# Patient Record
Sex: FEMALE | Race: WHITE | NOT HISPANIC OR LATINO | Employment: FULL TIME | URBAN - METROPOLITAN AREA
[De-identification: names, ages, dates, MRNs, and addresses within clinical notes are randomized per-mention and may not be internally consistent; named-entity substitution may affect disease eponyms.]

---

## 2017-01-06 ENCOUNTER — OFFICE VISIT (OUTPATIENT)
Dept: FAMILY MEDICINE | Facility: CLINIC | Age: 47
End: 2017-01-06

## 2017-01-06 VITALS
HEIGHT: 64 IN | TEMPERATURE: 97.8 F | WEIGHT: 180 LBS | BODY MASS INDEX: 30.73 KG/M2 | SYSTOLIC BLOOD PRESSURE: 119 MMHG | DIASTOLIC BLOOD PRESSURE: 73 MMHG | HEART RATE: 83 BPM

## 2017-01-06 DIAGNOSIS — R01.1 SYSTOLIC EJECTION MURMUR: ICD-10-CM

## 2017-01-06 DIAGNOSIS — Z01.818 PREOP GENERAL PHYSICAL EXAM: Primary | ICD-10-CM

## 2017-01-06 NOTE — MR AVS SNAPSHOT
After Visit Summary   1/6/2017    Barbara Camacho    MRN: [id number]           Patient Information     Date Of Birth          1970        Visit Information        Provider Department      1/6/2017 2:10 PM Chucho Lock MD [facility]        Today's Diagnoses     Preop general physical exam    -  1     Systolic ejection murmur           Care Instructions      Presurgery Checklist  You are scheduled to have surgery. The healthcare staff will try to make your stay comfortable. Use the guidelines below to remind yourself what to do before surgery. Be sure to follow any specific pre-op instructions from your surgeon or nurse.   Preparing for Surgery  Ask your surgeon if you ll need a blood transfusion during surgery and if so, how to prepare for it. In some cases, you can donate blood before surgery. If needed, this blood can be given back (transfused) to you during or after surgery.  If you are having abdominal surgery, ask what you need to do to clear your bowel.  Tell your surgeon if you have allergies to any medications or foods.  Arrange for an adult family member or friend to drive you home after surgery. If possible, have someone ready to help you at home as you recover.  Call the surgeon if you get a cold, fever, sore throat, diarrhea, or other health problem just before surgery. Your surgeon can decide whether or not to postpone the surgery.  Medications  Tell your surgeon about all medications you take, including prescription and over-the-counter products such as herbal remedies and vitamins. Ask if you should continue taking them.  If you take ibuprofen, naproxen, or  blood thinners  such as aspirin, clopidogrel (Plavix), or warfarin (Coumadin), ask your surgeon whether you should stop taking them and how long before surgery you should stop.  You may be told to take antibiotics just before surgery to prevent infection. If so, follow instructions carefully on how to take them.  If  you are told to take medications called anticoagulants to prevent blood clots after surgery, be sure to follow the instructions on how to take them.  Stop Smoking  If you smoke, healing may take longer. So at least 2 week(s) before surgery, stop smoking.  Bathing or Showering Before Surgery  If instructed, wash with antibacterial soap. Afterward, do not use lotions or powders.  If you are having surgery on the head, you may be asked to shampoo with antibacterial soap. Follow instructions for doing so.  Do Not Remove Hair from the Surgery Site  Do not shave hair from the incision site, unless you are given specific instructions to do so. Usually, if hair needs to be removed, it will be done at the hospital right before surgery.  Don t Eat or Drink  Your doctor will tell you when to stop eating and drinking. If you do not follow your doctor's instructions, your procedure may be postponed or rescheduled for another day.  If your surgeon tells you to continue any medications, take them with small sips of water.  You can brush your teeth and rinse your mouth, but don t swallow any water.  Day of Surgery  Do not wear makeup. Do not use perfume, deodorant, or hairspray. Remove nail polish and artificial nails.  Leave jewelry (including rings), watches, and other valuables at home.  Be sure to bring health insurance cards or forms and a photo ID.  Bring a list of your medications (include the name, dose, how often you take them, and the time last dose was taken).  Arrive on time at the hospital or surgery facility.        Follow-ups after your visit        Your next 10 appointments already scheduled     Jan 06, 2017  2:10 PM   PRE-OP with Chucho Lock MD   [facility] ([facility])    [address] [place] [id number]   [phone]              Who to contact     Please call your clinic at [phone] to:    Ask questions about your health    Make or cancel appointments    Discuss your  "medicines    Learn about your test results    Speak to your doctor   If you have compliments or concerns about an experience at your clinic, or if you wish to file a complaint, please contact [facility] Patient Relations at [phone] or email us at [email or web address]         Additional Information About Your Visit        [employer] Information     [employer] is an electronic gateway that provides easy, online access to your medical records. With [facility], you can request a clinic appointment, read your test results, renew a prescription or communicate with your care team.     To sign up for [facility] visit the website at [email or web address]   You will be asked to enter the access code listed below, as well as some personal information. Please follow the directions to create your username and password.     Your access code is: [email or web address]  Expires: 2017  2:04 PM     Your access code will  in 90 days. If you need help or a new code, please contact your [facility] or call [phone] for assistance.        Care EveryWhere ID     This is your Care EveryWhere ID. This could be used by other organizations to access your [facility] medical records  [id number]        Your Vitals Were     Pulse Temperature Height BMI (Body Mass Index) Last Period       83 97.8  F (36.6  C) (Oral) 5' 4\" (162.6 cm) 30.88 kg/m2 2011        Blood Pressure from Last 3 Encounters:   17 119/73   10/19/16 111/76   10/06/16 112/75    Weight from Last 3 Encounters:   17 180 lb (81.647 kg)   10/19/16 186 lb 6.4 oz (84.55 kg)   10/06/16 201 lb (91.173 kg)              We Performed the Following     Basic Metabolic Panel ([facility])     CBC with Diff Plt ([facility])        Primary Care Provider Office Phone # Fax #    Edvin Ring [phone]       [facility] [address] [place] [address]        Thank you!     Thank you for choosing " [facility]  for your care. Our goal is always to provide you with excellent care. Hearing back from our patients is one way we can continue to improve our services. Please take a few minutes to complete the written survey that you may receive in the mail after your visit with us. Thank you!             Your Updated Medication List - Protect others around you: Learn how to safely use, store and throw away your medicines at www.disposemymeds.org.          This list is accurate as of: 1/6/17  2:04 PM.  Always use your most recent med list.                   Brand Name Dispense Instructions for use    citalopram 10 MG tablet    celeXA    30 tablet    Take 1 tablet (10 mg) by mouth daily       erythromycin ophthalmic ointment    ROMYCIN    3.5 g    Place 1 Application into both eyes 2 times daily Apply to eyelids until clear. Restart prn

## 2017-01-06 NOTE — PROGRESS NOTES
[address] [address]  Phone: [phone]  Fax: [phone]    1/6/2017    Adult PRE-OP Evaluation:    Barbara Camacho, 1970 presents for pre-operative evaluation and assessment as requested by Dr. Jessica Robbins, prior to undergoing surgery/procedure for treatment of  Thyroid nodule .    Proposed procedure: Thyroid nodule removal both left and right    Date of Surgery/ Procedure: 1/9/17  Hospital/Surgical Facility: Patient uncertain, no pre-op assessment form available to us at this visit.    ? [facility] - General Surgeon - Jessica Robbins  ?     Primary Physician: Edvin Ring   Type of Anesthesia Anticipated: General  History of anesthesia complications: NONE  History of  abnormal bleeding: NONE   History of blood transfusions: NO  Patient has a Health Care Directive or Living Will:  NO    Preoperative Questions   1. NO - Do you have a history of heart attack, stroke, stent, bypass or surgery on an artery in the head, neck, heart or legs?  2. NO - Do you ever have any pain or discomfort in your chest?  3. NO - Have you ever had a severe pain across the front of your chest lasting for half an hour or more?  4. NO - Do you have a history of Congestive Heart Failure?  5. NO - Are you troubled by shortness of breath when: walking on the level/ up a slight hill/ at night?  6. NO - Does your chest ever sound wheezy or whistling?  7. NO - Do you currently have a cold, bronchitis or other respiratory infection?  8. NO - Have you had a cold, bronchitis or other respiratory infection within the last 2 weeks?  9. NO - Do you usually have a cough?  10. NO - Do you sometimes get pains in the calves of your legs when you walk?  11. NO - Do you or anyone in your family have previous history of blood clots?  12. NO - Do you or does anyone in your family have a serious bleeding problem such as prolonged bleeding following surgeries or cuts?  13. NO - Have you ever had problems with anemia or been told  "to take iron pills?  14. NO - Have you had any abnormal blood loss such as black, tarry or bloody stools, or abnormal vaginal bleeding?  15. NO - Have you ever had a blood transfusion?  16. NO - Have you or any of your relatives ever had problems with anesthesia?  17. NO - Do you have sleep apnea, excessive snoring or daytime drowsiness?  18. NO - Do you have any prosthetic heart valves?  19. NO - Do you have prosthetic joints?  20. NO - Is there any chance that you may be pregnant?    Patient Active Problem List   Diagnosis     Thyroid nodule     History of ovarian cancer     Anxiety     Systolic ejection murmur         Current Outpatient Prescriptions on File Prior to Visit:  citalopram (CELEXA) 10 MG tablet Take 1 tablet (10 mg) by mouth daily   erythromycin (ROMYCIN) ophthalmic ointment Place 1 Application into both eyes 2 times daily Apply to eyelids until clear. Restart prn     No current facility-administered medications on file prior to visit.    OTC products: None, except as noted above    Allergies   Allergen Reactions     Tylenol W/Codeine [Acetaminophen-Codeine]      Latex Allergy: NO    Social History     Social History     Marital Status:      Spouse Name: N/A     Number of Children: N/A     Years of Education: N/A     Social History Main Topics     Smoking status: Current Every Day Smoker -- 0.50 packs/day     Smokeless tobacco: Never Used     Alcohol Use: No     Drug Use: Yes      Comment: 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 In outpatient treatment.      Sexual Activity: No     Other Topics Concern     None     Social History Narrative       REVIEW OF SYSTEMS:   Constitutional, HEENT, cardiovascular, pulmonary, gi and gu systems are negative, except as otherwise noted.    EXAM:     Patient Vitals for the past 24 hrs:   BP Temp Temp src Pulse Height Weight   01/06/17 1320 119/73 mmHg 97.8  F (36.6  C) Oral 83 5' 4\" (162.6 cm) 180 lb (81.647 kg)     Body mass index is " 30.88 kg/(m^2).  GENERAL: healthy, alert and no distress  EYES: Eyes grossly normal to inspection, extraocular movements - intact, and PERRL  HENT: ear canals- normal; TMs- normal; Nose- normal; Mouth- no ulcers, no lesions + firm palpable nodule on R side > Left nodule  NECK: no tenderness, no adenopathy, no asymmetry, no masses, no stiffness; thyroid- normal to palpation  RESP: lungs clear to auscultation - no rales, no rhonchi, no wheezes  CV: regular rates and rhythm, normal S1 S2, + 2/6 ejection murmur best heard over left 2nd intercostal space  ABDOMEN: soft, no tenderness, no  hepatosplenomegaly, no masses, normal bowel sounds  MS: extremities- no gross deformities noted, no edema  SKIN: no suspicious lesions, no rashes  NEURO: strength and tone- normal, sensory exam- grossly normal, mentation- intact, speech- normal, reflexes- symmetric  BACK: no CVA tenderness, no paralumbar tenderness  PSYCH: Alert and oriented times 3; speech- coherent , normal rate and volume; able to articulate logical thoughts  LYMPHATICS: ant. cervical- normal, post. cervical- normal    DIAGNOSTICS:      None  No EKG indicated, no significant cardiac history    RISK ASSESSMENT:     Cardiovascular Risk:  -Patient is able to participate in strenuous activities without chest pain.  -The patient does not have chest pain at rest or with exertion  -Patient does not have a history of congestive heart failure.    -The patient does not have a history of stroke and does not have a history of valvular disease.    Pulmonary Risk:  -In terms of risk factors for pulmonary complication, the patient is an active 8 cigarette day smoker, 10 pack year history    Perioperative Complications:  -The patient does not have a history of bleeding or clotting problems in the past.    -The patient has not had complications from surgeries.    -The patient does not have a family history of any anesthesia or surgical complications.      IMPRESSION:   Reason for  surgery/procedure: Thyroid nodule removal bilaterally    The proposed surgical procedure is considered LOW risk.    For above listed surgery and anesthesia:   Patient is at low risk for surgery/procedure and perioperative/procedure complications.    RECOMMENDATIONS:     Labs:  No labs indicated at this time per [facility] pre-op guidelines. (Pre-op form was not available to us at this visit)    Fasting:  NPO for 12 hours prior to surgery    Preop Plan:  --Approval given to proceed with proposed procedure, without further diagnostic evaluation    Medications:  Patient should take their regular medications the morning of surgery unless otherwise instructed.    Hold aspirin 7 days prior to surgery.  Hold ibuprofen for 5 days prior.      Chucho Lock MD PGY2  [facility]  [phone]      Please contact our office if there are any further questions or information required about this patient.

## 2017-01-09 NOTE — PROGRESS NOTES
Preceptor attestation:  Patient seen and discussed with the resident.  Assessment and plan reviewed with resident and agreed upon.  Supervising physician: Ray Bolanos  [facility]

## 2017-06-02 ENCOUNTER — PRE VISIT (OUTPATIENT)
Dept: ORTHOPEDICS | Facility: CLINIC | Age: 47
End: 2017-06-02

## 2017-06-02 DIAGNOSIS — M25.532 LEFT WRIST PAIN: Primary | ICD-10-CM

## 2017-06-02 NOTE — TELEPHONE ENCOUNTER
Pt seen at [facility] in [place], [place]. Records available in [facility]. Not able to get images pushed.  Pt did report that she has a disc with her. She is coming in 30 minutes early and we will try to load images from that disc.    Simba Quintero RN

## 2017-07-20 DIAGNOSIS — F41.9 ANXIETY: ICD-10-CM

## 2017-07-20 RX ORDER — CITALOPRAM HYDROBROMIDE 10 MG/1
10 TABLET ORAL DAILY
Qty: 30 TABLET | Refills: 3 | Status: SHIPPED | OUTPATIENT
Start: 2017-07-20 | End: 2017-09-29

## 2017-09-29 DIAGNOSIS — F41.9 ANXIETY: ICD-10-CM

## 2017-09-29 RX ORDER — CITALOPRAM HYDROBROMIDE 10 MG/1
10 TABLET ORAL DAILY
Qty: 30 TABLET | Refills: 3 | Status: SHIPPED | OUTPATIENT
Start: 2017-09-29 | End: 2019-05-17

## 2019-05-17 ENCOUNTER — OFFICE VISIT (OUTPATIENT)
Dept: FAMILY MEDICINE | Facility: CLINIC | Age: 49
End: 2019-05-17
Payer: MEDICAID

## 2019-05-17 VITALS
RESPIRATION RATE: 16 BRPM | WEIGHT: 200 LBS | TEMPERATURE: 98.2 F | OXYGEN SATURATION: 99 % | HEIGHT: 63 IN | DIASTOLIC BLOOD PRESSURE: 76 MMHG | SYSTOLIC BLOOD PRESSURE: 118 MMHG | HEART RATE: 61 BPM | BODY MASS INDEX: 35.44 KG/M2

## 2019-05-17 DIAGNOSIS — F41.9 ANXIETY: ICD-10-CM

## 2019-05-17 DIAGNOSIS — Z98.84 HISTORY OF ROUX-EN-Y GASTRIC BYPASS: ICD-10-CM

## 2019-05-17 DIAGNOSIS — Z13.1 SCREENING FOR DIABETES MELLITUS: ICD-10-CM

## 2019-05-17 DIAGNOSIS — E04.9 THYROID GOITER: ICD-10-CM

## 2019-05-17 DIAGNOSIS — K21.9 GASTROESOPHAGEAL REFLUX DISEASE, ESOPHAGITIS PRESENCE NOT SPECIFIED: ICD-10-CM

## 2019-05-17 DIAGNOSIS — Z00.00 ENCOUNTER FOR PREVENTIVE CARE: Primary | ICD-10-CM

## 2019-05-17 DIAGNOSIS — Z12.31 ENCOUNTER FOR SCREENING MAMMOGRAM FOR BREAST CANCER: ICD-10-CM

## 2019-05-17 DIAGNOSIS — F17.210 CIGARETTE NICOTINE DEPENDENCE WITHOUT COMPLICATION: ICD-10-CM

## 2019-05-17 DIAGNOSIS — Z86.2 HISTORY OF ANEMIA: ICD-10-CM

## 2019-05-17 DIAGNOSIS — Z13.220 SCREENING FOR LIPOID DISORDERS: ICD-10-CM

## 2019-05-17 LAB
ALBUMIN SERPL-MCNC: 3.9 G/DL (ref 3.4–5)
ALP SERPL-CCNC: 75 U/L (ref 40–150)
ALT SERPL W P-5'-P-CCNC: 32 U/L (ref 0–50)
ANION GAP SERPL CALCULATED.3IONS-SCNC: 4 MMOL/L (ref 3–14)
AST SERPL W P-5'-P-CCNC: 29 U/L (ref 0–45)
BILIRUB SERPL-MCNC: 0.5 MG/DL (ref 0.2–1.3)
BUN SERPL-MCNC: 9 MG/DL (ref 7–30)
CALCIUM SERPL-MCNC: 8.6 MG/DL (ref 8.5–10.1)
CHLORIDE SERPL-SCNC: 108 MMOL/L (ref 94–109)
CHOLEST SERPL-MCNC: 158 MG/DL
CO2 SERPL-SCNC: 28 MMOL/L (ref 20–32)
CREAT SERPL-MCNC: 0.53 MG/DL (ref 0.52–1.04)
ERYTHROCYTE [DISTWIDTH] IN BLOOD BY AUTOMATED COUNT: 16.9 % (ref 10–15)
FERRITIN SERPL-MCNC: 6 NG/ML (ref 8–252)
GFR SERPL CREATININE-BSD FRML MDRD: >90 ML/MIN/{1.73_M2}
GLUCOSE SERPL-MCNC: 79 MG/DL (ref 70–99)
HBA1C MFR BLD: 5.4 % (ref 4.1–5.7)
HCT VFR BLD AUTO: 35.7 % (ref 35–47)
HDLC SERPL-MCNC: 75 MG/DL
HGB BLD-MCNC: 10.5 G/DL (ref 11.7–15.7)
IRON SATN MFR SERPL: 5 % (ref 15–46)
IRON SERPL-MCNC: 19 UG/DL (ref 35–180)
LDLC SERPL CALC-MCNC: 69 MG/DL
MCH RBC QN AUTO: 24.5 PG (ref 26.5–33)
MCHC RBC AUTO-ENTMCNC: 29.4 G/DL (ref 31.5–36.5)
MCV RBC AUTO: 83 FL (ref 78–100)
NONHDLC SERPL-MCNC: 83 MG/DL
PLATELET # BLD AUTO: 267 10E9/L (ref 150–450)
POTASSIUM SERPL-SCNC: 4.1 MMOL/L (ref 3.4–5.3)
PROT SERPL-MCNC: 7 G/DL (ref 6.8–8.8)
RBC # BLD AUTO: 4.29 10E12/L (ref 3.8–5.2)
SODIUM SERPL-SCNC: 140 MMOL/L (ref 133–144)
TIBC SERPL-MCNC: 355 UG/DL (ref 240–430)
TRIGL SERPL-MCNC: 70 MG/DL
TSH SERPL DL<=0.005 MIU/L-ACNC: 1.26 MU/L (ref 0.4–4)
VIT B12 SERPL-MCNC: 306 PG/ML (ref 193–986)
WBC # BLD AUTO: 5.6 10E9/L (ref 4–11)

## 2019-05-17 RX ORDER — HYDROXYZINE HYDROCHLORIDE 25 MG/1
25-50 TABLET, FILM COATED ORAL
Qty: 60 TABLET | Refills: 1 | Status: SHIPPED | OUTPATIENT
Start: 2019-05-17 | End: 2019-08-01

## 2019-05-17 RX ORDER — CALCIUM CARBONATE 500(1250)
1 TABLET ORAL 2 TIMES DAILY
Qty: 180 TABLET | Refills: 3 | Status: SHIPPED | OUTPATIENT
Start: 2019-05-17 | End: 2024-07-08

## 2019-05-17 RX ORDER — CITALOPRAM HYDROBROMIDE 40 MG/1
40 TABLET ORAL DAILY
Qty: 30 TABLET | Refills: 3 | Status: SHIPPED | OUTPATIENT
Start: 2019-05-17 | End: 2020-08-21

## 2019-05-17 ASSESSMENT — MIFFLIN-ST. JEOR: SCORE: 1516.08

## 2019-05-17 ASSESSMENT — PATIENT HEALTH QUESTIONNAIRE - PHQ9
SUM OF ALL RESPONSES TO PHQ QUESTIONS 1-9: 9
5. POOR APPETITE OR OVEREATING: MORE THAN HALF THE DAYS

## 2019-05-17 ASSESSMENT — ANXIETY QUESTIONNAIRES
6. BECOMING EASILY ANNOYED OR IRRITABLE: SEVERAL DAYS
IF YOU CHECKED OFF ANY PROBLEMS ON THIS QUESTIONNAIRE, HOW DIFFICULT HAVE THESE PROBLEMS MADE IT FOR YOU TO DO YOUR WORK, TAKE CARE OF THINGS AT HOME, OR GET ALONG WITH OTHER PEOPLE: VERY DIFFICULT
7. FEELING AFRAID AS IF SOMETHING AWFUL MIGHT HAPPEN: NOT AT ALL
3. WORRYING TOO MUCH ABOUT DIFFERENT THINGS: NEARLY EVERY DAY
5. BEING SO RESTLESS THAT IT IS HARD TO SIT STILL: NEARLY EVERY DAY
1. FEELING NERVOUS, ANXIOUS, OR ON EDGE: NEARLY EVERY DAY
GAD7 TOTAL SCORE: 15
2. NOT BEING ABLE TO STOP OR CONTROL WORRYING: NEARLY EVERY DAY

## 2019-05-17 NOTE — NURSING NOTE
"48 year old  Chief Complaint   Patient presents with     Physical     Pt. presents to the clinic today from [facility] for a physical exam. Trouble sleeping. wants an increase on CELEXA       Blood pressure 118/76, pulse 61, temperature 98.2  F (36.8  C), temperature source Oral, resp. rate 16, height 1.608 m (5' 3.3\"), weight 90.7 kg (200 lb), last menstrual period 06/30/2011, SpO2 99 %. Body mass index is 35.09 kg/m .  BP completed using cuff size:    Patient Active Problem List   Diagnosis     Thyroid nodule     History of ovarian cancer     Anxiety     Systolic ejection murmur       Wt Readings from Last 2 Encounters:   05/17/19 90.7 kg (200 lb)   01/06/17 81.6 kg (180 lb)     BP Readings from Last 3 Encounters:   05/17/19 118/76   01/06/17 119/73   10/19/16 111/76       Allergies   Allergen Reactions     Tylenol W/Codeine [Acetaminophen-Codeine]        Current Outpatient Medications   Medication     citalopram (CELEXA) 10 MG tablet     erythromycin (ROMYCIN) ophthalmic ointment     No current facility-administered medications for this visit.        Social History     Tobacco Use     Smoking status: Current Every Day Smoker     Packs/day: 0.50     Smokeless tobacco: Never Used   Substance Use Topics     Alcohol use: No     Drug use: Yes     Comment: 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 In outpatient treatment.        Honoring Choices - Health Care Directive Guide offered to patient at time of visit.    Health Maintenance Due   Topic Date Due     HIV SCREEN (SYSTEM ASSIGNED)  11/14/1989     PREVENTIVE CARE VISIT  10/06/2017     PHQ-2  01/01/2019       Immunization History   Administered Date(s) Administered     Influenza Vaccine, 3 YRS +, IM (QUADRIVALENT W/PRESERVATIVES) 10/06/2016     TDAP Vaccine (Boostrix) 10/19/2016       No results found for: PAP      Recent Labs   Lab Test 10/06/16  0908 07/23/11  1200 07/21/11  0743   A1C 5.5  --   --    LDL 83  --   --    HDL 69.4  --   --    TRIG " 65.7  --   --    CR  --  0.50* 0.59   GFRESTIMATED  --  >90 >90   GFRESTBLACK  --  >90 >90   POTASSIUM  --  3.5 3.7       PHQ-2 ( 1999 Pfizer) 5/17/2019 1/6/2017   Q1: Little interest or pleasure in doing things 0 0   Q2: Feeling down, depressed or hopeless 1 0   PHQ-2 Score 1 0       PHQ-9 SCORE 5/17/2019   PHQ-9 Total Score 9       ARLET-7 SCORE 5/17/2019   Total Score 15       No flowsheet data found.    Karla Schmitt CMA  May 17, 2019 9:09 AM

## 2019-05-17 NOTE — PROGRESS NOTES
" SUBJECTIVE:   Barbara Camacho is an 48 year old year old woman who presents for preventive health visit. Barbara needs an admission physical to [facility] chemical dependency treatment facility and was admitted 5/13/19  Patient has a history of chemical dependency to methamphetamine, clean date 12/14/18  Last visit to the dentist was unknown  Last visit to an eye provider was 2018: no improvement with corrective lenses, glasses are now broken     Thyroid goiter: been present in 2015. Recently had fine needle aspiration at [facility] on 4/25/19 and was never informed of results. Ultrasound last completed 4/19/19 which had demonstrated complex mass that had increased in size to 6cm x 1cm. Ultrasound and FNA were completed while patient was incarcerated and she never learned the results. Was done because she has noted slight increase in dysphagia compared to previous. Continues to eat/drink fluids well without difficulty and denies any airway obstruction. Endorses nodules on bilateral sides of thyroid, right is significantly larger than left.     Nicotine dependence; smoked off an on for the past 25 years. Pack year history roughly 12.5 years. Currently smokes about 6 cigarettes/day and is not interested in smoking cessation today. Denies any cough, wheezing, or dyspnea. Would consider pneumococcal vaccine today for ongoing smoking history.     Anxiety : with longstanding issues of sleeping. Has issues with falling asleep stating \"i'm an overthinker' and worries constantly. Also has issues with maintaining sleep. Is currently on celexa and would like to have dose increased. celexa was started roughly 2 months ago and reports \"it calmed it a litle bit\" but since transitioning to [facility] anxiety has been significantly worse. Has been having regular headaches.     ARLET-7 SCORE 5/17/2019   Total Score 15     PHQ-9 SCORE 5/17/2019   PHQ-9 Total Score 9     Methamphetamine addiction: currently in residential " "chemical dependency treatment at [facility] for methamphetamine addiction. Started using at age 37, never used IV. First time in treatment. Overall feeling well being sober, was incarcerated prior to starting at [facility] and doing well. Hoping to graduate to their outpatient program and maintain sober lifestyle.     GERD: previously controlled with zantac once daily. Would help \"for the most part\" with her symptoms. Symptoms present with indigestion, burning sensation, and belching.     History of chidi-en-y gastric bypass: for obesity in 2011. Had success with weight loss, has only recently started to gain weight back after achieving sobriety from methamphetamine addiction. Has not followed up with bariatric surgeon in several years, feels well overall.     Anemia: history of anemia with previous chidi-en-y bypass. Had not been getting iron supplementation while incarcerated and hoping to restart now she has been released. Denies any abnormal blood loss, dizziness, lightheadedness.     Healthy Habits:    Amount of exercise or daily activities, outside of work: walking    Problems taking medications regularly No    Medication side effects: No    Have you had an eye exam in the past two years? no    Do you see a dentist twice per year? no    Do you have sleep apnea, excessive snoring or daytime drowsiness?no    Water    Abuse: Current or Past(Physical, Sexual or Emotional)- No  Do you feel safe in your environment - Yes    Past Medical History:   Diagnosis Date     Anxiety      Cancer (H)     ovarian cancer at age 26 - treated with right oophorectomy. no chemo/radiation     Substance abuse (H)      Thyroid nodule 2016    bx done at [facility]      Past Surgical History:   Procedure Laterality Date     APPENDECTOMY OPEN      angeles and ovary removed all at same time as appy     CHOLECYSTECTOMY       FINE NEEDLE ASPIRATION  2019    thyroid     HERNIA REPAIR       LAPAROSCOPIC BYPASS GASTRIC  7/20/2011    " Procedure:LAPAROSCOPIC BYPASS GASTRIC; Hiatal hernia repair; Surgeon:FRANKIE VUONG; Location:[facility] OR     OOPHORECTOMY  2000?     Social History     Socioeconomic History     Marital status:      Spouse name: Not on file     Number of children: Not on file     Years of education: Not on file     Highest education level: Not on file   Occupational History     Not on file   Social Needs     Financial resource strain: Not on file     Food insecurity:     Worry: Not on file     Inability: Not on file     Transportation needs:     Medical: Not on file     Non-medical: Not on file   Tobacco Use     Smoking status: Current Every Day Smoker     Packs/day: 0.50     Smokeless tobacco: Never Used   Substance and Sexual Activity     Alcohol use: No     Drug use: Yes     Comment: 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 In outpatient treatment.      Sexual activity: Never   Lifestyle     Physical activity:     Days per week: Not on file     Minutes per session: Not on file     Stress: Not on file   Relationships     Social connections:     Talks on phone: Not on file     Gets together: Not on file     Attends Gnosticist service: Not on file     Active member of club or organization: Not on file     Attends meetings of clubs or organizations: Not on file     Relationship status: Not on file     Intimate partner violence:     Fear of current or ex partner: Not on file     Emotionally abused: Not on file     Physically abused: Not on file     Forced sexual activity: Not on file   Other Topics Concern     Parent/sibling w/ CABG, MI or angioplasty before 65F 55M? Not Asked   Social History Narrative     Not on file     Family History   Problem Relation Age of Onset     Coronary Artery Disease Maternal Grandmother      Diabetes Paternal Grandmother      Cancer No family hx of        If you drink alcohol do you typically have >3 drinks per day or >10 drinks per week? No                     Reviewed orders  with patient.  Reviewed health maintenance and updated orders accordingly - Yes      Current Outpatient Medications   Medication Sig Dispense Refill     citalopram (CELEXA) 10 MG tablet Take 1 tablet (10 mg) by mouth daily 30 tablet 3     erythromycin (ROMYCIN) ophthalmic ointment Place 1 Application into both eyes 2 times daily Apply to eyelids until clear. Restart prn (Patient not taking: Reported on 5/17/2019) 3.5 g 3          Allergies   Allergen Reactions     Tylenol W/Codeine [Acetaminophen-Codeine]         Preventive Health Screenings:  Mammogram: several years since last completed   Pap: refused - stated last was completed while in halfway in [place] 4-5 months ago and was normal.   Colonoscopy: Not indicated  HIV: Declined: had completed upon intake to halfway and has not been sexually active since.   STI: Declined: had completed upon intake to halfway and has not been sexually active since.   A1c: completed today 5/17/19  Lung cancer screening: Not indicated at this time: pack/year history 12.5 years  Lipids: Completed today 5/17/19  HCV: Declined: had completed upon intake to halfway and has not been sexually active or used any substances IV since.   Vaccinations: Current on Tdap (10/19/16). Uncertain if she has ever had MMR vaccines, hepatitis A or B vaccines. Will assess titers and proceed from there today.   DEXA: Not indicated      ROS:  Constitutional: no fevers, chills, night sweats or unintentional weight change   Eyes: no vision change, diplopia or red eyes   Ears, Nose, Mouth, Throat: no tinnitus or hearing change, no epistaxis or nasal discharge, no oral lesions, throat clear   Cardiovascular: no chest pain, palpitations, or pain with walking, no orthopnea or PND   Respiratory: no dyspnea, cough, shortness of breath or wheezing   Breasts: no skin changes, puckering, nipple discharge, masses  GI: no nausea, vomiting, diarrhea or constipation, no abdominal pain   : no change in urine, no dysuria or  "hematuria, no sexual dysfunction   Musculoskeletal: no joint or muscle pain or swelling   Integumentary: no concerning lesions or moles   Neuro: no loss of strength or sensation, no numbness or tingling, no tremor, no dizziness,  no gait disturbance   Endo: no polyuria or polydipsia, no temperature intolerance   Heme/Lymph: no concerning bumps, no bleeding problems   Allergy: no environmental allergies   Psych: no depression, + anxiety, some sleep problems      OBJECTIVE:   /76 (BP Location: Left arm, Patient Position: Chair, Cuff Size: Adult Regular)   Pulse 61   Temp 98.2  F (36.8  C) (Oral)   Resp 16   Ht 1.608 m (5' 3.3\")   Wt 90.7 kg (200 lb)   LMP 06/30/2011   SpO2 99%   BMI 35.09 kg/m    EXAM:  GENERAL: healthy, alert and no distress  EYES: Eyes grossly normal to inspection, PERRL and conjunctivae and sclerae normal  HENT: ear canals and TM's normal, nose and mouth without ulcers or lesions  NECK: no adenopathy, no asymmetry, masses, or scars and thyroid normal to palpation  RESP: lungs clear to auscultation - no rales, rhonchi or wheezes  BREAST: normal without masses, tenderness or nipple discharge and no palpable axillary masses or adenopathy  CV: regular rate and rhythm, normal S1 S2, no S3 or S4, no murmur, click or rub, no peripheral edema and peripheral pulses strong  ABDOMEN: soft, nontender, no hepatosplenomegaly, no masses and bowel sounds normal  MS: no gross musculoskeletal defects noted, no edema  SKIN: no suspicious lesions or rashes  NEURO: Normal strength and tone, mentation intact and speech normal  PSYCH: mentation appears normal, affect normal/bright    ASSESSMENT/PLAN:   1. Encounter for preventive care  Not meeting AHA guidelines of obtaining 150min/week of activity, encouraged patient to increase walking, participation of yoga at [facility] to help. Current on vision screening however corrective lenses has not helped vision and are now broken, [facility] to schedule office visit " as well as preventive dental care. Uncertain if she has had routine immunizations and will check titers.   - Comprehensive metabolic panel - Results > 1hr  - Hemoglobin A1c ([facility])  - Rubella Antibody IgG Quantitative  - Rubeola Antibody IgG; Future  - Mumps Antibody IgG; Future  - Hepatitis B Surface Antibody    2. Anxiety  Increase in citalopram from 20mg a day to 40mg a day for recent changes in environment and adjusting to life at chemical dependency center. With complaints of insomnia will also add PRN hydroxyzine 25mg to take at bedtime PRN. At follow up consider completing EKG to ensure QTc is stable.   ARLET-7 SCORE 5/17/2019   Total Score 15     - hydrOXYzine (ATARAX) 25 MG tablet; Take 1-2 tablets (25-50 mg) by mouth nightly as needed for other (insomnia)  Dispense: 60 tablet; Refill: 1  - citalopram (CELEXA) 40 MG tablet; Take 1 tablet (40 mg) by mouth daily  Dispense: 30 tablet; Refill: 3  - Vitamin D Level  - Lipid panel reflex to direct LDL Fasting  - Screening Mammogram Digital Bilateral; Future  - calcium carbonate (OS-KAIVTA) 500 MG tablet; Take 1 tablet (500 mg) by mouth 2 times daily  Dispense: 180 tablet; Refill: 3    3. Encounter for screening mammogram for breast cancer  Due for mammogram, uncertain of when last one was completed. Should not remote history of ovarian cancer that only needed treatment with oophorectomy.   - Screening Mammogram Digital Bilateral; Future    4. Screening for lipoid disorders  - Lipid panel reflex to direct LDL Fasting    5. History of anemia  Secondary to mylene-en-y gastric bypass surgery. Will recheck labs prior to resuming iron supplementation to ensure adequate dosing of medication.   - CBC with platelets  - Ferritin  - Iron and iron binding capacity  - Vitamin B12    6. History of Mylene-en-Y gastric bypass  - CBC with platelets  - Ferritin  - Iron and iron binding capacity  - Vitamin B12    7. Gastroesophageal reflux disease, esophagitis presence not  "specified  - ranitidine (ZANTAC) 150 MG tablet; Take 1 tablet (150 mg) by mouth 2 times daily  Dispense: 60 tablet; Refill: 1    8. Screening for diabetes mellitus  Positive family history of diabetes and BMI >30kg/m2 will screen for diabetes today with reported weight gain after achieving sobriety.   - Hemoglobin A1c ([facility])    9. Thyroid goiter  Utilized [id number] consultation line and spoke with Dr. Nichols and resident. With findings from FNA and size of goiter would like to see her in clinic to repeat FNA and [place] on findings. Recheck TSH.   - TSH with free T4 reflex  - OTOLARYNGOLOGY REFERRAL     10. Cigarette nicotine dependence without complication  Not currently interested in smoking cessation, would like to have pneumococcal 23-V vaccine today. Encouraged smoking cessation when ready.   - Pneumococcal vaccine 23 valent PPSV23  (Pneumovax) [59376]  - ADMIN: Vaccine, Initial (17926)    COUNSELING:            reports that he has never smoked. Hhdick has never used smokeless tobacco.  Tobacco Cessation Action Plan: Information offered: Patient not interested at this time  Estimated body mass index is 35.09 kg/(m^2) as calculated from the following:    Height as of this encounter: 5' 3.3\" (1.608 m).    Weight as of this encounter: 200 lb (90.7 kg).   Weight management plan: Discussed healthy diet and exercise guidelines    Counseling Resources:  ATP IV Guidelines  Pooled Cohorts Equation Calculator  ICSI Preventive Guidelines  Dietary Guidelines for Americans, 2010  USDA's MyPlate  ASA Prophylaxis  Lung CA Screening    Options for treatment and follow-up care were reviewed with the patient. Barbara Camacho   engaged in the decision making process and verbalized understanding of the options discussed and agreed with the final plan.    "

## 2019-05-17 NOTE — PATIENT INSTRUCTIONS
1. Encounter for preventive care  Can increase exercise to meet AHA recommendations of 150 minutes/week. Needs to have routine eye exam and dental exam/cleaning completed. YOBANI Amado will help schedule this.   - Comprehensive metabolic panel - Results > 1hr  - Hemoglobin A1c ([facility])  - Rubella Antibody IgG Quantitative  - Rubeola Antibody IgG; Future  - Mumps Antibody IgG; Future  - Hepatitis B Surface Antibody  - Vitamin D Level  - Lipid panel reflex to direct LDL Fasting  - Screening Mammogram Digital Bilateral; Future  - calcium carbonate (OS-KAVITA) 500 MG tablet; Take 1 tablet (500 mg) by mouth 2 times daily  Dispense: 180 tablet; Refill: 3    2. Anxiety  Increase in Celexa to 40 mg/day and add at nighttime as needed hydroxyzine to help with falling and staying asleep  - hydrOXYzine (ATARAX) 25 MG tablet; Take 1-2 tablets (25-50 mg) by mouth nightly as needed for other (insomnia)  Dispense: 60 tablet; Refill: 1  - citalopram (CELEXA) 40 MG tablet; Take 1 tablet (40 mg) by mouth daily  Dispense: 30 tablet; Refill: 3    3. Encounter for screening mammogram for breast cancer  Due for annual mammogram with history of ovarian cancer.   - Screening Mammogram Digital Bilateral; Future    4. Screening for lipoid disorders  - Lipid panel reflex to direct LDL Fasting    5. History of anemia  Secondary to previous Mylene-en-y procedure. Last CBC in 10/2018; before restarting your iron supplements will recheck your labs.   - CBC with platelets  - Ferritin  - Iron and iron binding capacity  - Vitamin B12    6. History of Mylene-en-Y gastric bypass  As above  - CBC with platelets  - Ferritin  - Iron and iron binding capacity  - Vitamin B12    7. Gastroesophageal reflux disease, esophagitis presence not specified  - ranitidine (ZANTAC) 150 MG tablet; Take 1 tablet (150 mg) by mouth 2 times daily  Dispense: 60 tablet; Refill: 1    8. Screening for diabetes mellitus  - Hemoglobin A1c ([facility] CLINIC)    9. Thyroid goiter  I  will call over to our ENT providers and ask them about next steps with your thyroid after the needle biopsy last month.   - TSH with free T4 reflex    10. Cigarette nicotine dependence without complication  Nicotine gum to help with smoking cessation today.   - Nicotine (NICORETTE) 2mg gum. Place 1 each (2 mg) inside cheek as needed for smoking cessation.   - Pneumococcal vaccine 23 valent PPSV23  (Pneumovax) [47063]  - ADMIN: Vaccine, Initial (34040)

## 2019-05-18 ASSESSMENT — ANXIETY QUESTIONNAIRES: GAD7 TOTAL SCORE: 15

## 2019-05-20 LAB
DEPRECATED CALCIDIOL+CALCIFEROL SERPL-MC: 10 UG/L (ref 20–75)
HBV SURFACE AB SERPL IA-ACNC: 0.13 M[IU]/ML
RUBV IGG SERPL IA-ACNC: 7 IU/ML

## 2019-05-23 DIAGNOSIS — E55.9 VITAMIN D DEFICIENCY: ICD-10-CM

## 2019-05-23 DIAGNOSIS — D50.9 IRON DEFICIENCY ANEMIA, UNSPECIFIED IRON DEFICIENCY ANEMIA TYPE: Primary | ICD-10-CM

## 2019-05-23 RX ORDER — FERROUS GLUCONATE 324(38)MG
324 TABLET ORAL 2 TIMES DAILY WITH MEALS
Qty: 60 TABLET | Refills: 3 | Status: SHIPPED | OUTPATIENT
Start: 2019-05-23 | End: 2020-08-21

## 2019-05-23 RX ORDER — ERGOCALCIFEROL 1.25 MG/1
50000 CAPSULE, LIQUID FILLED ORAL WEEKLY
Qty: 12 CAPSULE | Refills: 0 | Status: SHIPPED | OUTPATIENT
Start: 2019-05-23 | End: 2020-08-21

## 2019-05-23 NOTE — PROGRESS NOTES
Emailed health office staff at [facility] requesting patient contact clinic to review results from office visit.   Keysha Madera, APRN CNP  May 23, 2019

## 2019-06-17 ENCOUNTER — TELEPHONE (OUTPATIENT)
Dept: OTOLARYNGOLOGY | Facility: CLINIC | Age: 49
End: 2019-06-17

## 2019-06-17 NOTE — TELEPHONE ENCOUNTER
[facility] Call Center    Phone Message    May a detailed message be left on voicemail: yes    Reason for Call: Other: Referral in Epic - Thyroid goiter with Dr Nichols - guidelines say to send to clinic coordinator to review records and follow up with Pt for scheduling - Thanks      Action Taken: Message routed to:  [facility] ([facility] ENT

## 2019-06-20 NOTE — TELEPHONE ENCOUNTER
LVM with patient letting her know Date/Time of appt with Dr. Nichols.  Left call center number in case she needs to reschedule.

## 2019-07-08 ENCOUNTER — OFFICE VISIT (OUTPATIENT)
Dept: FAMILY MEDICINE | Facility: CLINIC | Age: 49
End: 2019-07-08
Payer: COMMERCIAL

## 2019-07-08 VITALS
WEIGHT: 203.8 LBS | TEMPERATURE: 98.1 F | HEART RATE: 61 BPM | BODY MASS INDEX: 34.79 KG/M2 | HEIGHT: 64 IN | SYSTOLIC BLOOD PRESSURE: 118 MMHG | RESPIRATION RATE: 18 BRPM | DIASTOLIC BLOOD PRESSURE: 79 MMHG

## 2019-07-08 DIAGNOSIS — M71.9 DISORDER OF BURSAE AND TENDONS IN SHOULDER REGION: ICD-10-CM

## 2019-07-08 DIAGNOSIS — M26.609 TMJ (TEMPOROMANDIBULAR JOINT SYNDROME): Primary | ICD-10-CM

## 2019-07-08 DIAGNOSIS — M75.101 ROTATOR CUFF SYNDROME, RIGHT: ICD-10-CM

## 2019-07-08 DIAGNOSIS — M67.919 DISORDER OF BURSAE AND TENDONS IN SHOULDER REGION: ICD-10-CM

## 2019-07-08 RX ORDER — TRIAMCINOLONE ACETONIDE 40 MG/ML
40 INJECTION, SUSPENSION INTRA-ARTICULAR; INTRAMUSCULAR ONCE
Status: COMPLETED | OUTPATIENT
Start: 2019-07-08 | End: 2019-07-08

## 2019-07-08 RX ORDER — IBUPROFEN 800 MG/1
800 TABLET, FILM COATED ORAL EVERY 8 HOURS PRN
Qty: 90 TABLET | Refills: 1 | Status: SHIPPED | OUTPATIENT
Start: 2019-07-08 | End: 2019-08-13

## 2019-07-08 RX ADMIN — TRIAMCINOLONE ACETONIDE 40 MG: 40 INJECTION, SUSPENSION INTRA-ARTICULAR; INTRAMUSCULAR at 16:38

## 2019-07-08 ASSESSMENT — MIFFLIN-ST. JEOR: SCORE: 1540.46

## 2019-07-08 NOTE — LETTER
7/8/2019      RE: Barbara Camacho  [address] [place] [address]     [facility], patient will be seen you in about 1 week's time for surgical evaluation concerning a very large goiter.  Today, complaining of left ear pain which I believe seems to be caused by left TMJ dysfunction.  No evidence for infection in the external ear nor pinna.  Appreciate if you could take a moment to review how she is doing in this regard when she sees you in 1 week, regards, Lamberto blanchard    This is a 48-year-old who attends today primarily complaining of left ear pain.  She reports that for about a week she has been unable to sleep on the left side of her face due to the pain.  She has had a cold with a runny nose productive of mainly clear mucus.  She has not had any drainage from her ear, her hearing has not been impaired nor does she have tinnitus.  She can palpate some tenderness over the left side of her face.  She has also been having more frequent headaches especially in the mornings.    She is not currently working and says she is in between jobs.  She has a known goiter and will be visiting with ENT in 1 week's time.  She knows that she will likely be offered surgical removal and she is agreeable with this plan.  Review of the records indicate that she had normal thyroid function within a few months.    In addition she complains of right shoulder pain which is a long-standing symptom for her.  She says is been present for about 5 years.  She has limited range of motion and demonstrates that she cannot reach behind her neck nor touch her lower back without causing pain.  She is right-hand dominant but cannot recall any particular injury.  She normally works at cleaning homes and acknowledges that she at times lifts heavier weights or engages in repetitive upper arm activities.    She mentions that she also had some left ankle swelling coincident with her ear symptoms although thinks that this is now improving.    Objective:  BP  "118/79   Pulse 61   Temp 98.1  F (36.7  C)   Resp 18   Ht 1.619 m (5' 3.75\")   Wt 92.4 kg (203 lb 12.8 oz)   LMP 06/30/2011   BMI 35.26 kg/m     She is obese, blood pressure is satisfactory.  I closely examined the left side of her face including the pinna and left ear canal.  She seems most tender on palpation of the left TMJ.  She finds it uncomfortable to fully open her mouth and there is a palpable click when she does this.  The pinna itself is not tender to palpation.  I cannot appreciate any preauricular adenopathy.  The external canal appears clear of infection.  The tympanic membrane is opaque but not injected.    She has a large goiter right greater than left with probable local compressive effects.    There is no obvious left ankle or foot swelling today although she clearly has venous disease in both lower extremities.  She is agreeable that her symptoms seem to have resolved in this area.    She has marked positive impingement signs of the right shoulder with decreased range of motion due to pain.    I offer her a cortisone shot into the subacromial bursa and surrounding tissues today and she accepts.    PROCEDURE NOTE:  Informed consent obtained  Antiseptic applied  Local anesthetic spray administered and 1cc kenalog with 4cc bupivicaine injected into right subacromial bursa and surrounding tissues without complication  Hemostasis achieved  Dressing applied    Barbara was seen today for ear problem.    Diagnoses and all orders for this visit:    TMJ (temporomandibular joint syndrome)  -     ibuprofen (ADVIL/MOTRIN) 800 MG tablet; Take 1 tablet (800 mg) by mouth every 8 hours as needed for moderate pain  -     order for DME; Equipment being ordered: 1 mouth splint for TMJ dysfunction    Rotator cuff syndrome, right    Disorder of bursae and tendons in shoulder region  -     Large Joint/Bursa injection and/or drainage (Shoulder, Knee)  -     triamcinolone (KENALOG-40) injection 40 mg      It appears " to me as if she is having TMJ symptoms which are causing the left-sided facial pain.  I do not see evidence for infection either in the pinna nor left ear.  She is visiting with ENT in a week concerning her goiter and I advised her that I will copy my notes to them and asked them to review whether they agree with a left TMJ dysfunction diagnosis.  I suggested she can follow with her dentist.  I prescribed a mouth splint to minimize teeth grinding and make her sleeping more comfortable and also recommended regular use of ibuprofen.    I have given her a cortisone injection today into her right shoulder.  I have suggested that she may experience benefit over the next 3 weeks but after that time is unlikely to experience any further benefit.  If she still having pain at 3 weeks time I have asked her to notify me in which case I will obtain an MRI of the shoulder.  She is agreeable with this plan.          Lamberto Lozano MD

## 2019-07-08 NOTE — PROGRESS NOTES
"This is a 48-year-old who attends today primarily complaining of left ear pain.  She reports that for about a week she has been unable to sleep on the left side of her face due to the pain.  She has had a cold with a runny nose productive of mainly clear mucus.  She has not had any drainage from her ear, her hearing has not been impaired nor does she have tinnitus.  She can palpate some tenderness over the left side of her face.  She has also been having more frequent headaches especially in the mornings.    She is not currently working and says she is in between jobs.  She has a known goiter and will be visiting with ENT in 1 week's time.  She knows that she will likely be offered surgical removal and she is agreeable with this plan.  Review of the records indicate that she had normal thyroid function within a few months.    In addition she complains of right shoulder pain which is a long-standing symptom for her.  She says is been present for about 5 years.  She has limited range of motion and demonstrates that she cannot reach behind her neck nor touch her lower back without causing pain.  She is right-hand dominant but cannot recall any particular injury.  She normally works at cleaning homes and acknowledges that she at times lifts heavier weights or engages in repetitive upper arm activities.    She mentions that she also had some left ankle swelling coincident with her ear symptoms although thinks that this is now improving.    Objective:  /79   Pulse 61   Temp 98.1  F (36.7  C)   Resp 18   Ht 1.619 m (5' 3.75\")   Wt 92.4 kg (203 lb 12.8 oz)   LMP 06/30/2011   BMI 35.26 kg/m    She is obese, blood pressure is satisfactory.  I closely examined the left side of her face including the pinna and left ear canal.  She seems most tender on palpation of the left TMJ.  She finds it uncomfortable to fully open her mouth and there is a palpable click when she does this.  The pinna itself is not tender to " palpation.  I cannot appreciate any preauricular adenopathy.  The external canal appears clear of infection.  The tympanic membrane is opaque but not injected.    She has a large goiter right greater than left with probable local compressive effects.    There is no obvious left ankle or foot swelling today although she clearly has venous disease in both lower extremities.  She is agreeable that her symptoms seem to have resolved in this area.    She has marked positive impingement signs of the right shoulder with decreased range of motion due to pain.    I offer her a cortisone shot into the subacromial bursa and surrounding tissues today and she accepts.    PROCEDURE NOTE:  Informed consent obtained  Antiseptic applied  Local anesthetic spray administered and 1cc kenalog with 4cc bupivicaine injected into right subacromial bursa and surrounding tissues without complication  Hemostasis achieved  Dressing applied    Barbara was seen today for ear problem.    Diagnoses and all orders for this visit:    TMJ (temporomandibular joint syndrome)  -     ibuprofen (ADVIL/MOTRIN) 800 MG tablet; Take 1 tablet (800 mg) by mouth every 8 hours as needed for moderate pain  -     order for DME; Equipment being ordered: 1 mouth splint for TMJ dysfunction    Rotator cuff syndrome, right    Disorder of bursae and tendons in shoulder region  -     Large Joint/Bursa injection and/or drainage (Shoulder, Knee)  -     triamcinolone (KENALOG-40) injection 40 mg      It appears to me as if she is having TMJ symptoms which are causing the left-sided facial pain.  I do not see evidence for infection either in the pinna nor left ear.  She is visiting with ENT in a week concerning her goiter and I advised her that I will copy my notes to them and asked them to review whether they agree with a left TMJ dysfunction diagnosis.  I suggested she can follow with her dentist.  I prescribed a mouth splint to minimize teeth grinding and make her sleeping  more comfortable and also recommended regular use of ibuprofen.    I have given her a cortisone injection today into her right shoulder.  I have suggested that she may experience benefit over the next 3 weeks but after that time is unlikely to experience any further benefit.  If she still having pain at 3 weeks time I have asked her to notify me in which case I will obtain an MRI of the shoulder.  She is agreeable with this plan.

## 2019-07-08 NOTE — PATIENT INSTRUCTIONS
Patient Education     TMJ Syndrome  The temporomandibular joint (TMJ) is the joint that connects your lower jaw to your head. You can feel it in front of your ears when you open and close your mouth. TMJ disorders involve chronic or recurrent pain in the joint. When treated, symptoms of TMJ disorders usually go away within a few months.  Causes  There is no widely agreed-on cause of TMJ disorders. They have been linked to injury, arthritis, chronic fatigue syndrome, and fibromyalgia. A definite connection has not been shown, though.  Symptoms    Pain in the face, jaw, or neck    Pain with jaw movement or chewing    Locking or catching sensation of the jaw    Clicking, popping, or grinding sounds with movement of the TMJ    Headache    Ear pain  Home care  Modest, nonsurgical treatments are a good first step toward relieving symptoms. Try the approaches described below.    Rest the jaw by avoiding crunchy or hard-to-chew foods. Don t eat hard or sticky candies. Soft foods and liquids are easier on the jaw.    Protect your jaw while yawning. If you need to yawn, put your fist under your chin to prevent your mouth from opening up too wide.    To help relieve pain, try applying hot or cold packs to the painful area. Try both hot and cold to find out which works best for you. To make a cold pack, put ice cubes in a plastic bag that seals at the top. Wrap the bag in a clean, thin towel or cloth. Never put ice or an ice pack directly on the skin. If you use hot packs (small towels soaked in hot water), be careful not to burn yourself.    You may take acetaminophen or ibuprofen for pain, unless you were given a different pain medicine. (Note: If you have chronic liver or kidney disease or have ever had a stomach ulcer or gastrointestinal bleeding, talk with your healthcare provider before using these medicines. Also talk to your provider if you are taking medicine to prevent blood clots.) Don t give aspirin to a child  younger than age 19 unless directed by the child s provider. Taking aspirin can put a child at risk for Reye syndrome. This is a rare but very serious disorder that most often affects the brain and the liver.  Reducing stress  If stress seems to be contributing to your symptoms, try to identify the sources of stress in your life. These aren t always obvious. Common stressors include:    Everyday hassles. These include things such as traffic jams, missed appointments, or car trouble.    Major life changes. These can be good, such as a new baby or job promotion. And they can be bad, such as losing a job or losing a loved one.    Overload. The feeling that you have too many responsibilities and can't take care of everything at once.    Helplessness. Feeling like your problems are more than you can solve.  When possible, do something about your sources of stress. See if you can avoid hassles, limit the amount of change in your life at one time, and take breaks when you feel overloaded.  Unfortunately, many stressful situations cannot be avoided. So learning how to manage stress better is very important. Getting regular exercise, eating nutritious, balanced meals, and getting adequate rest all help to make everyday stress more manageable. Certain techniques are also helpful: relaxation and breathing exercises, visualization, biofeedback, meditation, or simply taking some time out to clear your mind. For more information, talk with your healthcare provider.  Follow-up care  Follow up with your healthcare provider, or as advised. Further testing and additional treatment may be required. If changes to your lifestyle do not improve your symptoms, talk with your healthcare provider about other available therapies. These include bite guards for help with teeth grinding, stress management techniques, and more. If stress is an important factor and does not respond to the above simple measures, talk with your healthcare provider  about a referral for stress management.  If X-rays were done, they will be reviewed by a specialist. You will be notified of the results, especially if they affect treatment.  Call 911  Call 911 if any of these occur:    Trouble breathing or swallowing, wheezing    Confusion    Extreme drowsiness or trouble awakening    Fainting or loss of consciousness    Rapid heart rate  When to seek medical advice  Call your healthcare provider right away if any of these occur:    Swollen or red face    Pain gets worse    Neck, mouth, tooth, or throat pain gets worse    Fever of 100.4 F (38 C) or higher, or as directed by your healthcare provider  Date Last Reviewed: 10/1/2017    2762-9311 [employer]. [address] [place] [address], [place] [address]. All rights reserved. This information is not intended as a substitute for professional medical care. Always follow your healthcare professional's instructions.         If shoulder pain is still there in 3 weeks, please let me know and I will order MRI.  If pain is gone, it was all ROTATOR CUFF SYNDROME

## 2019-07-10 NOTE — TELEPHONE ENCOUNTER
FUTURE VISIT INFORMATION      FUTURE VISIT INFORMATION:    Date: 7/17/19    Time: 7:30AM    Location: [facility]  REFERRAL INFORMATION:    Referring provider:  Keysha Madera APRN CNP    Referring providers clinic:  [facility]     Reason for visit/diagnosis: Thyroid goiter     RECORDS REQUESTED FROM:       Clinic name Comments Records Status Imaging Status   [facility]  5/17/19 notes with Keysha Madera NP [facility]    [address] [place] [address]    [phone]   04/25/2019 THYROID, RIGHT, ULTRASOUND-GUIDED FINE NEEDLE ASPIRATION (Case: [id number]) Care Everywhere req 7/11 - PACS                             7/10/19 9:47AM sent a fax to [facility] records. A fax has been sent to [facility] for slides to be mailed out  - Amay     7/11/19 2:53PM received slides from [facility], sending pathology slides and consult form to surgical pathology and called [facility] for us images - amay

## 2019-07-14 NOTE — PROGRESS NOTES
Jul 17, 2019        Dear Dr. Madera:    I had the pleasure of meeting Barbara Camacho in consultation today at the [facility] at your request.     HISTORY OF PRESENT ILLNESS:  Ms. Camacho is a 48 year old female with a PMHx of ovarian cancer (s/p unilateral oopherectomy) who presents with a 3 year history of a thyroid goiter and recent FNA (4/19) of a right thyroid nodule.  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        Over the past three years she has felt asymmetric enlargement of her thyroid, with right growing faster than left. She has not experienced any SOB or dysphagia, nor any weight loss, diarrhea, or fevers, and chills. She has no history of thyroid disease.  She is here to discuss next steps.      MEDICATIONS:     Current Outpatient Medications   Medication Sig Dispense Refill     calcium carbonate (OS-KAVITA) 500 MG tablet Take 1 tablet (500 mg) by mouth 2 times daily 180 tablet 3     citalopram (CELEXA) 40 MG tablet Take 1 tablet (40 mg) by mouth daily 30 tablet 3     ferrous gluconate (FERGON) 324 (38 Fe) MG tablet Take 1 tablet (324 mg) by mouth 2 times daily (with meals) 60 tablet 3     hydrOXYzine (ATARAX) 25 MG tablet Take 1-2 tablets (25-50 mg) by mouth nightly as needed for other (insomnia) 60 tablet 1     ibuprofen (ADVIL/MOTRIN) 800 MG tablet Take 1 tablet (800 mg) by mouth every 8 hours as needed for moderate pain 90 tablet 1     nicotine (NICORETTE) 2 MG gum Place 1 each (2 mg) inside cheek as needed for smoking cessation 120 tablet 0     order for DME Equipment being ordered: 1 mouth splint for TMJ dysfunction 1 Device 0     ranitidine (ZANTAC) 150 MG tablet Take 1 tablet (150 mg) by mouth 2 times daily 60 tablet 1     vitamin D2 (ERGOCALCIFEROL) 37182 units (1250 mcg) capsule Take 1 capsule (50,000 Units) by mouth once a week 12 capsule 0       ALLERGIES:    Allergies   Allergen  Reactions     Codeine Itching     Tylenol W/Codeine [Acetaminophen-Codeine] Hives        HABITS/SOCIAL HISTORY:  LIves in [place], [place] with her 18 yo son. Recently started working as a nighttime  and uses her voice regularly. She does not sing, nor does she feel that she has a good singing voice.     Has a 31yo daughter with 6 grandkids, very close with her family in the area. Current smoker, 10pyh (1/2ppd) but trying to quit independently of a program. No alcohol use or history of excessive alcohol intake.      PAST MEDICAL HISTORY:   Past Medical History:   Diagnosis Date     Anemia      Anxiety      Cancer (H)     ovarian cancer at age 26 - treated with right oophorectomy. no chemo/radiation     Gastroesophageal reflux disease      Substance abuse (H)      Thyroid nodule 2016    bx done at [facility]      PAST SURGICAL HISTORY:   Unilateral oopherectomy (22 years ago)   Mylene-en-Y gastric bypass   Cesearean section (2002)     No problems with bleeding or anes    FAMILY HISTORY:    Family History   Problem Relation Age of Onset     Coronary Artery Disease Maternal Grandmother      Hypertension Maternal Grandmother      Unknown/Adopted Maternal Grandmother      Depression Maternal Grandmother      Heart Disease Maternal Grandmother      Stomach Problem Maternal Grandmother      Diabetes Paternal Grandmother      Dementia Paternal Grandmother      Asthma Paternal Grandmother      Rheumatoid Arthritis Mother      Unknown/Adopted Mother      Depression Mother      Myocardial Infarction Father      Hypertension Father      Asthma Father      Depression Father      Heart Disease Father      Back Pain Maternal Grandfather      Substance Abuse Maternal Grandfather      Dementia Maternal Grandfather      Unknown/Adopted Maternal Grandfather      Rheumatoid Arthritis Maternal Aunt      Stomach Cancer Maternal Aunt      No Known Problems Paternal Grandfather      Myocardial Infarction Paternal Aunt       Cerebrovascular Disease Paternal Aunt      Thyroid Disease Other      Migraines Other      Cancer No family hx of    Maternal aunt: Hashimotos disease     REVIEW OF SYSTEMS:    A 10 point Review of Systems was performed and pertinent positives are noted in the HPI; remaining positives are:  Patient Supplied Answers to Review of Systems  [facility] ROS 7/17/2019   Constitutional Weight gain   Neurology Headache   Psychology Frequently feeling anxious   Gastrointestinal/Genitourinary Heartburn/indigestion   Musculoskeletal Swollen legs/feet   Hematologic Easy bruising       PHYSICAL EXAMINATION:    Constitutional:  The patient was accompained by her mother, well-groomed, and pleasant    Skin: Normal:  warm and pink without rash   Neurologic: Alert and oriented x 3.  CN's III-XII within normal limits.  Voice normal.    Psychiatric: The patient's affect was calm, cooperative, and appropriate.     Communication:  Normal; communicates verbally, normal voice quality.   Respiratory: Breathing comfortably without stridor or exertion of accessory muscles.    Head/Face:  Normocephalic and atraumatic.  No lesions or scars. No sinus tenderness.  Negative Chvostek's.  Salivary glands -  Normal size, no tenderness, swelling, or palpable masses   Eyes: Pupils were equal and reactive.  Extraocular movement intact.     Ears: Pinnae and tragus non-tender.  EAC's and TM's were clear and intact.      Nose: Sinuses were non-tender.  Anterior rhinoscopy revealed midline septum and absence of purulence or polyps.     Oral Cavity: Normal tongue, floor of mouth, buccal mucosa, and palate.  No lesions or masses on inspection or palpation.     Oropharynx: Normal mucosa, palate symmetric with normal elevation. No abnormal lymph tissue in the oropharynx.  Pterygoid region non-tender.    Hypopharynx: Mirror exam shows absence of pooled secretions and normal  pyriform sinus and pharyngeal wall mucosa.   Larynx: Mirror exam shows sharp epiglottis,  false vocal cords, true vocal cords.  Mobile arytenoids and cords.  Cords are clear and mobile.     Neck: No evidence of masses inAsymmetric thyroid goiter, right larger than left due to large right sided thyroid nodule.  the parotid beds were without masses. Normal neck range of motion   Lymphatic: There is no palpable lymphadenopathy in the neck.      Fiberoptic Endoscopy:  Consent for fiberoptic laryngoscopy was obtained, and we confirmed correctness of procedure and identity of patient.  Fiberoptic laryngoscopy was indicated due to preoperative evaluation of vocal cord mobility prior to thyroidectomy.  The nose was topically decongested and anesthetized.  The fiberoptic laryngoscope was passed under endoscopic vision.  The turbinates were normal.  The inferior and middle meati were clear bilaterally without purulence, masses, or polyps.  The nasopharynx was clear.  The Eustachian tubes were clear.  The soft palate appeared normal with good mobility.  The epiglottis was sharp and the visualized portion of the vallecula was clear.  The larynx was clear with mobile cords.  The arytenoids were clear and there was no pooling in the hypopharynx.      Imaging:   Thyroid ultrasound (7/17/2019)   Multinodular thyroid with large nodule in the right lobe measuring 8.5  cm. This meets TI-RADS 4 criteria, laterally suspicious. This has been  previously sampled and returned suspicious for follicular neoplasm. No  dedicated thyroid imaging other than procedural FNA to compare size.  Small nodule in the left lobe most consistent with a colloid nodule.    LABS: TSH 1.53 (1/2018)    IMPRESSION AND PLAN:   Mr. Camacho is a 49yo female with a history of thyroid goiter and recent FNA of a right nodule concerning for follicular neoplasm. This does not be appear to be compressing her airway significantly or causing dysphagia but based on the nodule size and FNA results she meets criteria for thyroidectomy. We discussed that due to   Simone's schedule limitations, the surgery may not be until after August. She is comfortable with waiting and will call with any questions or concerns prior to.     We discussed the procedure with her as well as the risks and benefits. The risks of the procedure include but are not limited to the risk of bleeding, infection, numbness in the ear and cheek and face, and scarring in the incision line.  Due to the size of the right mass, the incision will likely be larger than usual, she is aware of this. Even if we were to use a small incision, we would be unable to remove the mass through a more typical 4 cm incision. We will still try to hide the incision in a skin crease, and within a year it would be much less visible.     We discussed the small risk of unilateral vocal cord injury due to recurrent laryngeal nerve injury that can result in hoarseness and aspiration.  Often this is transient, but if permanent, we treat the symptoms with a vocal cord implant.  However, it would not restore a good singing voice, and she notes that this is not relevant since she doesn't sing.  The incidence of bilateral simultaneous injury is very rare, but in that event, there could be need for a tracheotomy. We also talked about the risk of injury to all four parathyroid glands, resulting in permanent hypocalcemia.  This is a complicated problem that would require calcium pills and vitamin D three times a day for the rest of the patient's life. The patient understands the risks of the procedure.     We will schedule total thyroidectomy for her later this summer.  I would like her to meet Dr Bowen since we will need her assistance in management, especially if this turns out to be a well-differentiated tumor.     Andreina Dunlap, MS4      ADDENDUM:  I have separately spoken with and examined Ms. Camacho.  I was present with the patient for the entire viewing portion of the endoscopy procedure (including scope insertion and withdrawal).   I have edited Ms Fabi's note to reflect my perspective on the findings, assessment, and plan.    Thank you very much for the opportunity to participate in the care of your patient.      Glenroy Nichols MD  Otolaryngology/Head & Neck Surgery  [phone]              CC  DWIGHT Quigley CNP  [address] [address]    Dayami Bowen MD  [facility] Endocrinology  [address] [facility] [address]  [place] [address]

## 2019-07-15 DIAGNOSIS — E04.9 THYROID GOITER: Primary | ICD-10-CM

## 2019-07-15 PROCEDURE — 00000346 ZZHCL STATISTIC REVIEW OUTSIDE SLIDES TC 88321: Performed by: OTOLARYNGOLOGY

## 2019-07-16 ENCOUNTER — TELEPHONE (OUTPATIENT)
Dept: OTOLARYNGOLOGY | Facility: CLINIC | Age: 49
End: 2019-07-16

## 2019-07-16 LAB — COPATH REPORT: NORMAL

## 2019-07-16 NOTE — TELEPHONE ENCOUNTER
Called pt regarding scheduled thyroid ultrasound prior to appointment tomorrow. No answer. Voicemail left. Explained that the appointment has been scheduled for 0615 at the [facility]. Direct line left for additional questions/concerns.     Natice Schwab, RN BSN

## 2019-07-17 ENCOUNTER — PRE VISIT (OUTPATIENT)
Dept: OTOLARYNGOLOGY | Facility: CLINIC | Age: 49
End: 2019-07-17

## 2019-07-17 ENCOUNTER — ANCILLARY PROCEDURE (OUTPATIENT)
Dept: ULTRASOUND IMAGING | Facility: CLINIC | Age: 49
End: 2019-07-17
Attending: OTOLARYNGOLOGY
Payer: COMMERCIAL

## 2019-07-17 ENCOUNTER — OFFICE VISIT (OUTPATIENT)
Dept: OTOLARYNGOLOGY | Facility: CLINIC | Age: 49
End: 2019-07-17
Payer: COMMERCIAL

## 2019-07-17 VITALS
TEMPERATURE: 98.1 F | SYSTOLIC BLOOD PRESSURE: 124 MMHG | BODY MASS INDEX: 34.15 KG/M2 | WEIGHT: 200 LBS | HEART RATE: 76 BPM | DIASTOLIC BLOOD PRESSURE: 75 MMHG | HEIGHT: 64 IN | RESPIRATION RATE: 17 BRPM

## 2019-07-17 DIAGNOSIS — E04.9 THYROID GOITER: Primary | ICD-10-CM

## 2019-07-17 DIAGNOSIS — E04.9 THYROID GOITER: ICD-10-CM

## 2019-07-17 ASSESSMENT — MIFFLIN-ST. JEOR: SCORE: 1523.7

## 2019-07-17 ASSESSMENT — PAIN SCALES - GENERAL: PAINLEVEL: NO PAIN (0)

## 2019-07-17 NOTE — NURSING NOTE
"Chief Complaint   Patient presents with     Consult     thyroid goiter      Blood pressure 124/75, pulse 76, temperature 98.1  F (36.7  C), resp. rate 17, height 1.62 m (5' 3.78\"), weight 90.7 kg (200 lb), last menstrual period 06/30/2011.    Catracho Villalba LPN    "

## 2019-07-17 NOTE — PATIENT INSTRUCTIONS
1. You were seen in the ENT Clinic today by Dr. Nichols.  If you have any questions or concerns after your appointment, please call   - Option 1: ENT Clinic: [phone]  - Option 2: [facility] (Dr. Nichols's Nurse): [phone]    2. You will be contacted by our surgery scheduler to schedule your total thyroidectomy       Natice Schwab, RN  [facility]- Otolaryngology  [phone]

## 2019-07-17 NOTE — LETTER
7/17/2019       RE: Barbara Camacho  [address] [place] [address]     Dear Colleague,    Thank you for referring your patient, Barbara Camacho, to the [facility] EAR NOSE AND THROAT at [facility]. Please see a copy of my visit note below.    Jul 17, 2019        Dear Dr. Madera:    I had the pleasure of meeting Barbara Camacho in consultation today at the [facility] at your request.     HISTORY OF PRESENT ILLNESS:  Ms. Camacho is a 48 year old female with a PMHx of ovarian cancer (s/p unilateral oopherectomy) who presents with a 3 year history of a thyroid goiter and recent FNA (4/19) of a right thyroid nodule.  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        Over the past three years she has felt asymmetric enlargement of her thyroid, with right growing faster than left. She has not experienced any SOB or dysphagia, nor any weight loss, diarrhea, or fevers, and chills. She has no history of thyroid disease.  She is here to discuss next steps.      MEDICATIONS:     Current Outpatient Medications   Medication Sig Dispense Refill     calcium carbonate (OS-KAVITA) 500 MG tablet Take 1 tablet (500 mg) by mouth 2 times daily 180 tablet 3     citalopram (CELEXA) 40 MG tablet Take 1 tablet (40 mg) by mouth daily 30 tablet 3     ferrous gluconate (FERGON) 324 (38 Fe) MG tablet Take 1 tablet (324 mg) by mouth 2 times daily (with meals) 60 tablet 3     hydrOXYzine (ATARAX) 25 MG tablet Take 1-2 tablets (25-50 mg) by mouth nightly as needed for other (insomnia) 60 tablet 1     ibuprofen (ADVIL/MOTRIN) 800 MG tablet Take 1 tablet (800 mg) by mouth every 8 hours as needed for moderate pain 90 tablet 1     nicotine (NICORETTE) 2 MG gum Place 1 each (2 mg) inside cheek as needed for smoking cessation 120 tablet 0     order for DME Equipment being ordered: 1 mouth splint for TMJ  dysfunction 1 Device 0     ranitidine (ZANTAC) 150 MG tablet Take 1 tablet (150 mg) by mouth 2 times daily 60 tablet 1     vitamin D2 (ERGOCALCIFEROL) 05397 units (1250 mcg) capsule Take 1 capsule (50,000 Units) by mouth once a week 12 capsule 0       ALLERGIES:    Allergies   Allergen Reactions     Codeine Itching     Tylenol W/Codeine [Acetaminophen-Codeine] Hives        HABITS/SOCIAL HISTORY:  LIves in [place], [place] with her 18 yo son. Recently started working as a nighttime  and uses her voice regularly. She does not sing, nor does she feel that she has a good singing voice.     Has a 31yo daughter with 6 grandkids, very close with her family in the area. Current smoker, 10pyh (1/2ppd) but trying to quit independently of a program. No alcohol use or history of excessive alcohol intake.      PAST MEDICAL HISTORY:   Past Medical History:   Diagnosis Date     Anemia      Anxiety      Cancer (H)     ovarian cancer at age 26 - treated with right oophorectomy. no chemo/radiation     Gastroesophageal reflux disease      Substance abuse (H)      Thyroid nodule 2016    bx done at [facility]      PAST SURGICAL HISTORY:   Unilateral oopherectomy (22 years ago)   Mylene-en-Y gastric bypass   Cesearean section (2002)     No problems with bleeding or anes    FAMILY HISTORY:    Family History   Problem Relation Age of Onset     Coronary Artery Disease Maternal Grandmother      Hypertension Maternal Grandmother      Unknown/Adopted Maternal Grandmother      Depression Maternal Grandmother      Heart Disease Maternal Grandmother      Stomach Problem Maternal Grandmother      Diabetes Paternal Grandmother      Dementia Paternal Grandmother      Asthma Paternal Grandmother      Rheumatoid Arthritis Mother      Unknown/Adopted Mother      Depression Mother      Myocardial Infarction Father      Hypertension Father      Asthma Father      Depression Father      Heart Disease Father      Back Pain Maternal Grandfather       Substance Abuse Maternal Grandfather      Dementia Maternal Grandfather      Unknown/Adopted Maternal Grandfather      Rheumatoid Arthritis Maternal Aunt      Stomach Cancer Maternal Aunt      No Known Problems Paternal Grandfather      Myocardial Infarction Paternal Aunt      Cerebrovascular Disease Paternal Aunt      Thyroid Disease Other      Migraines Other      Cancer No family hx of    Maternal aunt: Hashimotos disease     REVIEW OF SYSTEMS:    A 10 point Review of Systems was performed and pertinent positives are noted in the HPI; remaining positives are:  Patient Supplied Answers to Review of Systems  [facility] ENT ROS 7/17/2019   Constitutional Weight gain   Neurology Headache   Psychology Frequently feeling anxious   Gastrointestinal/Genitourinary Heartburn/indigestion   Musculoskeletal Swollen legs/feet   Hematologic Easy bruising       PHYSICAL EXAMINATION:    Constitutional:  The patient was accompained by her mother, well-groomed, and pleasant    Skin: Normal:  warm and pink without rash   Neurologic: Alert and oriented x 3.  CN's III-XII within normal limits.  Voice normal.    Psychiatric: The patient's affect was calm, cooperative, and appropriate.     Communication:  Normal; communicates verbally, normal voice quality.   Respiratory: Breathing comfortably without stridor or exertion of accessory muscles.    Head/Face:  Normocephalic and atraumatic.  No lesions or scars. No sinus tenderness.  Negative Chvostek's.  Salivary glands -  Normal size, no tenderness, swelling, or palpable masses   Eyes: Pupils were equal and reactive.  Extraocular movement intact.     Ears: Pinnae and tragus non-tender.  EAC's and TM's were clear and intact.      Nose: Sinuses were non-tender.  Anterior rhinoscopy revealed midline septum and absence of purulence or polyps.     Oral Cavity: Normal tongue, floor of mouth, buccal mucosa, and palate.  No lesions or masses on inspection or palpation.     Oropharynx: Normal mucosa,  palate symmetric with normal elevation. No abnormal lymph tissue in the oropharynx.  Pterygoid region non-tender.    Hypopharynx: Mirror exam shows absence of pooled secretions and normal  pyriform sinus and pharyngeal wall mucosa.   Larynx: Mirror exam shows sharp epiglottis, false vocal cords, true vocal cords.  Mobile arytenoids and cords.  Cords are clear and mobile.     Neck: No evidence of masses inAsymmetric thyroid goiter, right larger than left due to large right sided thyroid nodule.  the parotid beds were without masses. Normal neck range of motion   Lymphatic: There is no palpable lymphadenopathy in the neck.      Fiberoptic Endoscopy:  Consent for fiberoptic laryngoscopy was obtained, and we confirmed correctness of procedure and identity of patient.  Fiberoptic laryngoscopy was indicated due to preoperative evaluation of vocal cord mobility prior to thyroidectomy.  The nose was topically decongested and anesthetized.  The fiberoptic laryngoscope was passed under endoscopic vision.  The turbinates were normal.  The inferior and middle meati were clear bilaterally without purulence, masses, or polyps.  The nasopharynx was clear.  The Eustachian tubes were clear.  The soft palate appeared normal with good mobility.  The epiglottis was sharp and the visualized portion of the vallecula was clear.  The larynx was clear with mobile cords.  The arytenoids were clear and there was no pooling in the hypopharynx.      Imaging:   Thyroid ultrasound (7/17/2019)   Multinodular thyroid with large nodule in the right lobe measuring 8.5  cm. This meets TI-RADS 4 criteria, laterally suspicious. This has been  previously sampled and returned suspicious for follicular neoplasm. No  dedicated thyroid imaging other than procedural FNA to compare size.  Small nodule in the left lobe most consistent with a colloid nodule.    LABS: TSH 1.53 (1/2018)    IMPRESSION AND PLAN:   Mr. Camacho is a 49yo female with a history of thyroid  goiter and recent FNA of a right nodule concerning for follicular neoplasm. This does not be appear to be compressing her airway significantly or causing dysphagia but based on the nodule size and FNA results she meets criteria for thyroidectomy. We discussed that due to Dr Nichols's schedule limitations, the surgery may not be until after August. She is comfortable with waiting and will call with any questions or concerns prior to.     We discussed the procedure with her as well as the risks and benefits. The risks of the procedure include but are not limited to the risk of bleeding, infection, numbness in the ear and cheek and face, and scarring in the incision line.  Due to the size of the right mass, the incision will likely be larger than usual, she is aware of this. Even if we were to use a small incision, we would be unable to remove the mass through a more typical 4 cm incision. We will still try to hide the incision in a skin crease, and within a year it would be much less visible.     We discussed the small risk of unilateral vocal cord injury due to recurrent laryngeal nerve injury that can result in hoarseness and aspiration.  Often this is transient, but if permanent, we treat the symptoms with a vocal cord implant.  However, it would not restore a good singing voice, and she notes that this is not relevant since she doesn't sing.  The incidence of bilateral simultaneous injury is very rare, but in that event, there could be need for a tracheotomy. We also talked about the risk of injury to all four parathyroid glands, resulting in permanent hypocalcemia.  This is a complicated problem that would require calcium pills and vitamin D three times a day for the rest of the patient's life. The patient understands the risks of the procedure.     We will schedule total thyroidectomy for her later this summer.  I would like her to meet Dr Bowen since we will need her assistance in management, especially if  this turns out to be a well-differentiated tumor.     Andreina Dunlap, MS4      ADDENDUM:  I have separately spoken with and examined Ms. Camacho.  I was present with the patient for the entire viewing portion of the endoscopy procedure (including scope insertion and withdrawal).  I have edited Ms Dunlap's note to reflect my perspective on the findings, assessment, and plan.    Thank you very much for the opportunity to participate in the care of your patient.      Glenroy Nichols MD  Otolaryngology/Head & Neck Surgery  [phone]      CC  DWIGHT Quigley CNP  [address] [address]    Dayami Bowen MD  [facility] Endocrinology  [address]  [place] [address]

## 2019-08-01 DIAGNOSIS — K21.9 GASTROESOPHAGEAL REFLUX DISEASE, ESOPHAGITIS PRESENCE NOT SPECIFIED: ICD-10-CM

## 2019-08-01 DIAGNOSIS — F41.9 ANXIETY: ICD-10-CM

## 2019-08-05 RX ORDER — HYDROXYZINE HYDROCHLORIDE 25 MG/1
25-50 TABLET, FILM COATED ORAL
Qty: 60 TABLET | Refills: 3 | Status: SHIPPED | OUTPATIENT
Start: 2019-08-05 | End: 2020-08-21

## 2019-08-13 ENCOUNTER — OFFICE VISIT (OUTPATIENT)
Dept: FAMILY MEDICINE | Facility: CLINIC | Age: 49
End: 2019-08-13
Payer: COMMERCIAL

## 2019-08-13 VITALS
TEMPERATURE: 98.7 F | WEIGHT: 196 LBS | BODY MASS INDEX: 33.88 KG/M2 | SYSTOLIC BLOOD PRESSURE: 133 MMHG | DIASTOLIC BLOOD PRESSURE: 85 MMHG | HEART RATE: 78 BPM | OXYGEN SATURATION: 98 % | RESPIRATION RATE: 16 BRPM

## 2019-08-13 DIAGNOSIS — S40.011A CONTUSION OF RIGHT SHOULDER, INITIAL ENCOUNTER: Primary | ICD-10-CM

## 2019-08-13 RX ORDER — ACETAMINOPHEN 500 MG
500-1000 TABLET ORAL EVERY 6 HOURS PRN
Status: ON HOLD
Start: 2019-08-13 | End: 2019-10-21

## 2019-08-13 NOTE — PROGRESS NOTES
SUBJECTIVE       Barbara Camacho is a 48 year old  female with a PMH significant for:     Patient Active Problem List   Diagnosis     Thyroid nodule     History of ovarian cancer     Anxiety     Systolic ejection murmur     Patient reports a fall last night while she was carrying stuff. She fell/slid down ~6 stairs onto her right shoulder. She also may have hurt her right leg but there is no bruise. No bruises on her shoulder. She did hit her head, LOC. She has never had surgery on the right shoulder but has had arthritis of the shoulder and received cortisone shots in the past. She has full ROM however she has difficulty taking off her shirt due to the pain. She denies any weakness, numbness, or tingling in the right arm or hand.  She has been taking 1600mg of ibuprofen TID yesterday and today, and takes 800mg daily for the arthritis of her shoulder. She has been having heartburn lately, especially over the last couple days. She does have ranitidine available but reports that the heartburn has not been bad enough to use.     PMH, Medications and Allergies were reviewed and updated as needed.        OBJECTIVE     Vitals:    08/13/19 1422   BP: 133/85   BP Location: Left arm   Patient Position: Sitting   Pulse: 78   Resp: 16   Temp: 98.7  F (37.1  C)   TempSrc: Oral   SpO2: 98%   Weight: 88.9 kg (196 lb)     Body mass index is 33.88 kg/m .    General :  healthy and alert, no distress  Cardiovascular: regular rate and rhythm, normal S1/S2 no other heart sounds  Respiratory:  CTA, normal respiratory effort  Musculoskeletal: no edema. Active ROM of the right shoulder is limited by pain, passive ROM in fully intact but with some pain. Tenderness to palpation over the triceps muscle and over the AC joint. No hesitancy with humoral stress/pressure. AC joint space is equal bilaterally. No tenderness to palpation of the elbow, posterior shoulder, or anterior shoulder.   Skin:   No bruises  Neurological:  Normal sensation  of hands bilaterally. No numbness or tingling in the hands.       ASSESSMENT AND PLAN       Barbara was seen today for fall and pain.    Diagnoses and all orders for this visit:    Contusion of right shoulder, initial encounter: Patient with contusion of the right shoulder without any concern for fracture or neurovascular compromise.  Patient is been taking ibuprofen more than prescribed.  Due to recent recommendations will discontinue ibuprofen at this time to allow for permissive inflammation, however will use topical NSAIDs, diclofenac.  Also start Tylenol.  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    -     PHYSICAL THERAPY REFERRAL; Future  -     diclofenac (VOLTAREN) 1 % topical gel; Place 2 g onto the skin 4 times daily  -     acetaminophen (TYLENOL) 500 MG tablet; Take 1-2 tablets (500-1,000 mg) by mouth every 6 hours as needed for mild pain        RTC in 2 weeks for follow up of shoulder contusion or sooner if develops new or worsening symptoms.    Discussed with MD Jj Douglas PGY 2  [facility]

## 2019-08-13 NOTE — PROGRESS NOTES
Preceptor Attestation:   Patient seen, evaluated and discussed with the resident. I have verified the content of the note, which accurately reflects my assessment of the patient and the plan of care.   Supervising Physician:  Galindo Broussard MD

## 2019-08-15 ENCOUNTER — HOSPITAL ENCOUNTER (OUTPATIENT)
Facility: CLINIC | Age: 49
End: 2019-08-15
Attending: OTOLARYNGOLOGY | Admitting: OTOLARYNGOLOGY
Payer: COMMERCIAL

## 2019-08-15 NOTE — PATIENT INSTRUCTIONS
08/15/19    I called and left a message for patient to call back to help schedule referral.     08/16/19   I spoke with patient today, and called [facility] to schedule for her. I spoke with Marybeth at [facility] and was advised that [facility] is not contracted with the patients [place] insurance so they have to send in paperwork for authorization (up to 14 business day turn-around) first before they can see the patient. I faxed over demographics, referral, and notes to [facility]. Marybeth said they would contact the patient once they receive insurance authorization results.     Siobhan Spangler    Physical Therapy Referral    [facility]  [address], [address]  [place] [address]   [phone]  fax [phone]

## 2019-08-17 RX ORDER — CEFAZOLIN SODIUM 2 G/100ML
2 INJECTION, SOLUTION INTRAVENOUS
Status: CANCELLED | OUTPATIENT
Start: 2019-09-09

## 2019-08-21 ENCOUNTER — TELEPHONE (OUTPATIENT)
Dept: OTOLARYNGOLOGY | Facility: CLINIC | Age: 49
End: 2019-08-21

## 2019-08-21 NOTE — TELEPHONE ENCOUNTER
To schedulers: Please schedule  for lst available endocrine approximately 2 weeks after the thyroid surgery.  Preferred provider  Gem Bowen Radulescu Lynn A Burmeister, MD  Endocrine triage       Health Call Center    Phone Message    May a detailed message be left on voicemail: yes    Reason for Call: Other: Referral received for thyroidectomy post op care     Action Taken: Message routed to:  [facility] ([facility] Endocrinology

## 2019-08-22 NOTE — TELEPHONE ENCOUNTER
Incoming referral from Dr Nichols. Patient's thyroidectomy is scheduled for 9/9. Please advise appropriate date for post-op appt with Endocrine. We are currently booking out to last week of October for 1st available NEN.     Thanks,  Rola

## 2019-08-27 NOTE — TELEPHONE ENCOUNTER
RECORDS RECEIVED FROM: INTERNAL   DATE RECEIVED: 9/25/19   NOTES (FOR ALL VISITS) STATUS DETAILS   OFFICE NOTES from referring provider Internal 7/17/19   OFFICE NOTES from other specialist Internal 7/17/10   ED NOTES N/A    OPERATIVE REPORT  (thyroid, pituitary, adrenal, parathyroid) In process 9/9/19   MEDICATION LIST Internal    IMAGING      DEXASCAN N/A    MRI (BRAIN) N/A    XR (Chest) N/A    CT (HEAD/NECK/CHEST/ABDOMEN) N/A    NUCLEAR  N/A    ULTRASOUND (HEAD/NECK) Internal 7/17/19   LABS     DIABETES: HBGA1C, CREATININE, FASTING LIPIDS, MICROALBUMIN URINE, POTASSIUM, TSH, T4    THYROID: TSH, T4, CBC, THYRODLONULIN, TOTAL T3, FREE T4, CALCITONIN, CEA Internal   5/17/19 7/17/19

## 2019-08-29 ENCOUNTER — ANCILLARY PROCEDURE (OUTPATIENT)
Dept: GENERAL RADIOLOGY | Facility: CLINIC | Age: 49
End: 2019-08-29
Attending: FAMILY MEDICINE
Payer: COMMERCIAL

## 2019-08-29 ENCOUNTER — OFFICE VISIT (OUTPATIENT)
Dept: ORTHOPEDICS | Facility: CLINIC | Age: 49
End: 2019-08-29
Payer: COMMERCIAL

## 2019-08-29 VITALS — BODY MASS INDEX: 33.46 KG/M2 | HEIGHT: 64 IN | RESPIRATION RATE: 16 BRPM | WEIGHT: 196 LBS

## 2019-08-29 DIAGNOSIS — M54.2 NECK PAIN: Primary | ICD-10-CM

## 2019-08-29 RX ORDER — TIZANIDINE 2 MG/1
2-4 TABLET ORAL 3 TIMES DAILY PRN
Qty: 30 TABLET | Refills: 0 | Status: SHIPPED | OUTPATIENT
Start: 2019-08-29 | End: 2020-08-21

## 2019-08-29 ASSESSMENT — MIFFLIN-ST. JEOR: SCORE: 1509.05
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
## 2019-08-29 NOTE — LETTER
Patient:  Barbara Camacho  :   1970  MRN:     [id number]      2019    To whom it may concern:    Barbara Camacho is under my professional care for a neck injury and may return to work on 19 with the following restrictions:     Limited to no bending at the waist or working on hands and knees.     Restrictions to remain in place until 14 days.        Please contact our office with questions or concerns.     Sincerely,        Herman Torres MD
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
## 2019-09-06 ENCOUNTER — ANESTHESIA EVENT (OUTPATIENT)
Dept: SURGERY | Facility: CLINIC | Age: 49
End: 2019-09-06

## 2019-09-06 NOTE — OR NURSING
Pt never called back to clarify where and when she was having her pre-op H&P. Earlier today,she had said she was going today at 3 pm to [facility]. Call placed to [facility]. Pt did not have an appt there at 3 pm today She did however, have an appt at the [facility] NP clinic at 1 pm today and was a no show. Text page sent to Dr Glenroy Nichols, letting him know pt missed her H&P appt today and asking if he can update her H&P on DOS. Awaiting response.

## 2019-09-06 NOTE — OR NURSING
No response yet from Dr Nichols. I called his nurse, Natice Schwab, and asked her how I could reach Dr Nichols. She paged his resident, Dr Proctor, who called back. I explained that pt missed her H&P appt.and will need an H&P update on DOS.  She said she will let Dr Nichols know.

## 2019-09-06 NOTE — OR NURSING
Called pt to see if she went to her pre-op appt today at [facility] and she said they could not get her in. She said she was on her way to an appt at [facility] at 3 pm today. When I looked in the chart at her scheduled  Appointments-it says 1 pm today at [facility]. (It is 1:46 pm now). I LM for pt to call back and clarify.

## 2019-09-08 ASSESSMENT — LIFESTYLE VARIABLES: TOBACCO_USE: 1

## 2019-09-09 ENCOUNTER — ANESTHESIA (OUTPATIENT)
Dept: SURGERY | Facility: CLINIC | Age: 49
End: 2019-09-09

## 2019-09-09 NOTE — PROGRESS NOTES
Ms Camacho did not show for surgery this morning.  She was scheduled to arrive at 5:30 am, but did not come in.  The 3C nursing team called her home at 6:20 and her family said that she was out, but had left her phone behind.  I was notified, and learned that she no-showed for her H&P last week.  I was aware that we would have to update her H&P but did not realize that she was a no-show last week as well.    I asked the 3C team to give her a little more time.  At approximately 7 am, she still hadn't showed, and I asked the 3C team to call again.  This time there has been no answer.      We will cancel the case and ask our RNCC to reach out to patient.

## 2019-09-09 NOTE — PROGRESS NOTES
Pt scheduled for surgery at 0730 but did not show. Called number listed, person stated she was not home and left her phone at the house. They stated they would try to get a hold of her and have her call us. We attempted a call again at 0700 with no answer. Pt was also a no show for her pre-op appointment. Dr. Nichols was notified and cancelled the case.

## 2019-09-09 NOTE — ANESTHESIA PREPROCEDURE EVALUATION
Anesthesia Pre-Procedure Evaluation    Patient: Barbara Camacho   MRN:     [id number] Gender:   female   Age:    48 year old :      1970        Preoperative Diagnosis: Thyroid Goiter   Procedure(s):  Total Thyroidectomy     Past Medical History:   Diagnosis Date     Anemia      Anxiety      Cancer (H)     ovarian cancer at age 26 - treated with right oophorectomy. no chemo/radiation     Gastroesophageal reflux disease      Substance abuse (H)      Thyroid nodule 2016    bx done at [facility]       Past Surgical History:   Procedure Laterality Date     APPENDECTOMY OPEN      angeles and ovary removed all at same time as appy     CHOLECYSTECTOMY       FINE NEEDLE ASPIRATION      thyroid     HERNIA REPAIR       LAPAROSCOPIC BYPASS GASTRIC  2011    Procedure:LAPAROSCOPIC BYPASS GASTRIC; Hiatal hernia repair; Surgeon:FRANKIE VUONG; Location:[facility] OR     OOPHORECTOMY  ?          Anesthesia Evaluation     . Pt has had prior anesthetic.            ROS/MED HX    ENT/Pulmonary:     (+)GABBY risk factors snores loudly, tobacco use, Past use , . .    Neurologic:     (+)migraines,     Cardiovascular:     (+) ----. : . . . :. valvular problems/murmurs Systolic  ejection murmur:.       METS/Exercise Tolerance:  >4 METS   Hematologic:         Musculoskeletal:         GI/Hepatic:     (+) GERD       Renal/Genitourinary:         Endo:     (+) thyroid problem  Thyroid disease - Other Thyroid nodule; unclear what pathology, .      Psychiatric:     (+) psychiatric history anxiety      Infectious Disease:         Malignancy:   (+) Malignancy History of Other  Other CA H/o ovarian cancer s/p right oopherectomy;  serial C-125 negative/WNL Remission status post Surgery         Other:                     JAM FV AN PHYSICAL EXAM    LABS:  CBC:   Lab Results   Component Value Date    WBC 5.6 2019    WBC 11.0 2011    HGB 10.5 (L) 2019    HGB 12.9 2011    HCT 35.7 2019    HCT 36.9 2011  "    05/17/2019     07/23/2011     BMP:   Lab Results   Component Value Date     05/17/2019     07/23/2011    POTASSIUM 4.1 05/17/2019    POTASSIUM 3.5 07/23/2011    CHLORIDE 108 05/17/2019    CHLORIDE 104 07/23/2011    CO2 28 05/17/2019    CO2 26 07/23/2011    BUN 9 05/17/2019    BUN 5 07/23/2011    CR 0.53 05/17/2019    CR 0.50 (L) 07/23/2011    GLC 79 05/17/2019    GLC 69 07/23/2011     COAGS: No results found for: PTT, INR, FIBR  POC:   Lab Results   Component Value Date    HCGS Negative 07/20/2011     OTHER:   Lab Results   Component Value Date    A1C 5.4 05/17/2019    KAVITA 8.6 05/17/2019    PHOS 2.8 07/21/2011    MAG 2.0 07/21/2011    ALBUMIN 3.9 05/17/2019    PROTTOTAL 7.0 05/17/2019    ALT 32 05/17/2019    AST 29 05/17/2019    ALKPHOS 75 05/17/2019    BILITOTAL 0.5 05/17/2019    TSH 1.26 05/17/2019        Preop Vitals    BP Readings from Last 3 Encounters:   08/13/19 133/85   07/17/19 124/75   07/08/19 118/79    Pulse Readings from Last 3 Encounters:   08/13/19 78   07/17/19 76   07/08/19 61      Resp Readings from Last 3 Encounters:   08/29/19 16   08/13/19 16   07/17/19 17    SpO2 Readings from Last 3 Encounters:   08/13/19 98%   05/17/19 99%   10/19/16 100%      Temp Readings from Last 1 Encounters:   08/13/19 37.1  C (98.7  F) (Oral)    Ht Readings from Last 1 Encounters:   08/29/19 1.626 m (5' 4\")      Wt Readings from Last 1 Encounters:   08/29/19 88.9 kg (196 lb)    Estimated body mass index is 33.64 kg/m  as calculated from the following:    Height as of 8/29/19: 1.626 m (5' 4\").    Weight as of 8/29/19: 88.9 kg (196 lb).     LDA:        Assessment: Procedure Canceled due to   ASA SCORE: 2    H&P: History and physical reviewed and following examination; no interval change.   Smoking Status:  Non-Smoker/Unknown   NPO Status: Will be NPO Appropriate at ... 9/9/2019 6:00 AM     PONV Management:  NO PONV Prophylaxis Required                   Emily Nixon MD  "

## 2019-09-10 ENCOUNTER — TELEPHONE (OUTPATIENT)
Dept: OTOLARYNGOLOGY | Facility: CLINIC | Age: 49
End: 2019-09-10

## 2019-09-10 NOTE — TELEPHONE ENCOUNTER
Pt called to f/o after no-show to surgery yesterday. No answer. Voicemail left with direct line for call back.     Natice Schwab, RN BSN

## 2019-09-11 ENCOUNTER — TELEPHONE (OUTPATIENT)
Dept: OTOLARYNGOLOGY | Facility: CLINIC | Age: 49
End: 2019-09-11

## 2019-09-11 NOTE — TELEPHONE ENCOUNTER
Pt called regarding no-show to schedule OR case Monday. No answer. Voicemail left with direct line for call-back.     Natice Schwab, RN BSN

## 2019-09-11 NOTE — TELEPHONE ENCOUNTER
Voicemail received from Barbara hoping to reschedule surgery. Phone call returned; given surgery schedulers number to get rescheduled. Instructed that she will need a pre-op physical within 30 days of procedure. Direct line given for additional questions/concerns.     Natice Schwab, RN BSN

## 2019-09-25 ENCOUNTER — PRE VISIT (OUTPATIENT)
Dept: ENDOCRINOLOGY | Facility: CLINIC | Age: 49
End: 2019-09-25

## 2019-10-18 ENCOUNTER — TELEPHONE (OUTPATIENT)
Dept: OTOLARYNGOLOGY | Facility: CLINIC | Age: 49
End: 2019-10-18

## 2019-10-18 ENCOUNTER — ANESTHESIA EVENT (OUTPATIENT)
Dept: SURGERY | Facility: CLINIC | Age: 49
End: 2019-10-18
Payer: COMMERCIAL

## 2019-10-18 ENCOUNTER — TRANSFERRED RECORDS (OUTPATIENT)
Dept: HEALTH INFORMATION MANAGEMENT | Facility: CLINIC | Age: 49
End: 2019-10-18

## 2019-10-18 ASSESSMENT — LIFESTYLE VARIABLES: TOBACCO_USE: 1

## 2019-10-18 NOTE — TELEPHONE ENCOUNTER
Called pt as the pre-op nurse had reached out to me stating she was a no-show for her pre-op exam. No answer. Voicemail left explaining that she needs a pre-op exam prior to her scheduled surgery 10/21. If this is not completed we will not be able to proceed with surgery. Direct line given for call-back to discuss.     Mother Homa also called. No answer. Same voicemail left. Direct line given for call-back.     Natice Schwab, RN BSN

## 2019-10-19 NOTE — ANESTHESIA PREPROCEDURE EVALUATION
Anesthesia Pre-Procedure Evaluation    Patient: Barbara Camacho   MRN:     [id number] Gender:   female   Age:    48 year old :      1970        Preoperative Diagnosis: Thyroid Nodules   Procedure(s):  Total Thyroidectomy     HPI: nodule in neck noted in , biopsied over summer after thyroid nodule noted on ultrasound which showed atypical cells.  Plan for right partial thyroidectomy.  Patient did not show for previously scheduled surgery and this is now re-scheduled for today.     Past Medical History:   Diagnosis Date     Anemia      Anxiety      Cancer (H)     ovarian cancer at age 26 - treated with right oophorectomy. no chemo/radiation     Gastroesophageal reflux disease      Substance abuse (H)      Thyroid nodule 2016    bx done at [facility]       Past Surgical History:   Procedure Laterality Date     APPENDECTOMY OPEN      angeles and ovary removed all at same time as appy     CHOLECYSTECTOMY       FINE NEEDLE ASPIRATION      thyroid     HERNIA REPAIR       LAPAROSCOPIC BYPASS GASTRIC  2011    Procedure:LAPAROSCOPIC BYPASS GASTRIC; Hiatal hernia repair; Surgeon:FRANKIE VUONG; Location:[facility] OR     OOPHORECTOMY  ?          Anesthesia Evaluation     . Pt has had prior anesthetic. Type: General (11; 0814; Airway Size: 7;  Cuffed;  Oral endotracheal tube;  Blade Type: Mino;  Blade Size: 3;  Insertion Attempts: 1;  Secured at (cm)to lip: 22 cm;  Breath Sounds: Equal, clear and bilateral;  End Tidal CO2: Present;  Dentition: Intact;  Grade)           ROS/MED HX    ENT/Pulmonary:     (+)GABBY risk factors snores loudly, tobacco use, Past use , . .    Neurologic:     (+)migraines,     Cardiovascular:     (+) ----. : . . . :. valvular problems/murmurs Systolic  ejection murmur:.       METS/Exercise Tolerance:  >4 METS   Hematologic:         Musculoskeletal:         GI/Hepatic:     (+) GERD       Renal/Genitourinary:         Endo:     (+) thyroid problem  Thyroid disease - Other  Thyroid nodule; unclear pathology, .      Psychiatric:     (+) psychiatric history anxiety      Infectious Disease:         Malignancy:   (+) Malignancy History of Other  Other CA H/o ovarian cancer s/p right oopherectomy;  serial C-125 negative/WNL Remission status post Surgery         Other:                         PHYSICAL EXAM:   Mental Status/Neuro:    Airway: Facies: Feasible  Mallampati: II  Mouth/Opening: Full  TM distance: > 6 cm  Neck ROM: Full   Respiratory: Auscultation: CTAB     Resp. Rate: Normal     Resp. Effort: Normal      CV: Rhythm: Regular  Rate: Age appropriate  Heart: Normal Sounds  Edema: None   Comments:      Dental: Details                  LABS:  CBC:   Lab Results   Component Value Date    WBC 5.6 05/17/2019    WBC 11.0 07/23/2011    HGB 10.5 (L) 05/17/2019    HGB 12.9 07/23/2011    HCT 35.7 05/17/2019    HCT 36.9 07/23/2011     05/17/2019     07/23/2011     BMP:   Lab Results   Component Value Date     05/17/2019     07/23/2011    POTASSIUM 4.1 05/17/2019    POTASSIUM 3.5 07/23/2011    CHLORIDE 108 05/17/2019    CHLORIDE 104 07/23/2011    CO2 28 05/17/2019    CO2 26 07/23/2011    BUN 9 05/17/2019    BUN 5 07/23/2011    CR 0.53 05/17/2019    CR 0.50 (L) 07/23/2011    GLC 79 05/17/2019    GLC 69 07/23/2011     COAGS: No results found for: PTT, INR, FIBR  POC:   Lab Results   Component Value Date    HCGS Negative 07/20/2011     OTHER:   Lab Results   Component Value Date    A1C 5.4 05/17/2019    KAVITA 8.6 05/17/2019    PHOS 2.8 07/21/2011    MAG 2.0 07/21/2011    ALBUMIN 3.9 05/17/2019    PROTTOTAL 7.0 05/17/2019    ALT 32 05/17/2019    AST 29 05/17/2019    ALKPHOS 75 05/17/2019    BILITOTAL 0.5 05/17/2019    TSH 1.26 05/17/2019        Preop Vitals    BP Readings from Last 3 Encounters:   08/13/19 133/85   07/17/19 124/75   07/08/19 118/79    Pulse Readings from Last 3 Encounters:   08/13/19 78   07/17/19 76   07/08/19 61      Resp Readings from Last 3 Encounters:  "  08/29/19 16   08/13/19 16   07/17/19 17    SpO2 Readings from Last 3 Encounters:   08/13/19 98%   05/17/19 99%   10/19/16 100%      Temp Readings from Last 1 Encounters:   08/13/19 37.1  C (98.7  F) (Oral)    Ht Readings from Last 1 Encounters:   08/29/19 1.626 m (5' 4\")      Wt Readings from Last 1 Encounters:   08/29/19 88.9 kg (196 lb)    Estimated body mass index is 33.64 kg/m  as calculated from the following:    Height as of 8/29/19: 1.626 m (5' 4\").    Weight as of 8/29/19: 88.9 kg (196 lb).     LDA:        Assessment:   ASA SCORE: 2    H&P: History and physical reviewed and following examination; no interval change.   Smoking Status:  Non-Smoker/Unknown        Plan:   Anes. Type:  General   Pre-Medication: Midazolam   Induction:  IV (Standard)   Airway: ETT; Oral; CMAC/VL (NIM tube )   Access/Monitoring: PIV   Maintenance: TIVA     Postop Plan:   Postop Pain: Opioids  Postop Sedation/Airway: Not planned     PONV Management:   Adult Risk Factors: Female, Non-Smoker, Postop Opioids   Prevention: Ondansetron, Dexamethasone, No Volatiles     CONSENT: Direct conversation   Plan and risks discussed with: Patient   Blood Products: Consented (ALL Blood Products)                   Gama Graham III, MD  "

## 2019-10-21 ENCOUNTER — HOSPITAL ENCOUNTER (OUTPATIENT)
Facility: CLINIC | Age: 49
Discharge: HOME | End: 2019-10-21
Attending: OTOLARYNGOLOGY | Admitting: OTOLARYNGOLOGY
Payer: COMMERCIAL

## 2019-10-21 ENCOUNTER — ANESTHESIA (OUTPATIENT)
Dept: SURGERY | Facility: CLINIC | Age: 49
End: 2019-10-21
Payer: COMMERCIAL

## 2019-10-21 VITALS
WEIGHT: 188.49 LBS | HEART RATE: 70 BPM | OXYGEN SATURATION: 97 % | HEIGHT: 64 IN | TEMPERATURE: 98.4 F | DIASTOLIC BLOOD PRESSURE: 80 MMHG | BODY MASS INDEX: 32.18 KG/M2 | SYSTOLIC BLOOD PRESSURE: 124 MMHG | RESPIRATION RATE: 16 BRPM

## 2019-10-21 DIAGNOSIS — E04.1 THYROID NODULE: Primary | ICD-10-CM

## 2019-10-21 LAB — GLUCOSE BLDC GLUCOMTR-MCNC: 110 MG/DL (ref 70–99)

## 2019-10-21 PROCEDURE — 36000062 ZZH SURGERY LEVEL 4 1ST 30 MIN - UMMC: Performed by: OTOLARYNGOLOGY

## 2019-10-21 PROCEDURE — 71000027 ZZH RECOVERY PHASE 2 EACH 15 MINS: Performed by: OTOLARYNGOLOGY

## 2019-10-21 PROCEDURE — 71000015 ZZH RECOVERY PHASE 1 LEVEL 2 EA ADDTL HR: Performed by: OTOLARYNGOLOGY

## 2019-10-21 PROCEDURE — 25800030 ZZH RX IP 258 OP 636: Performed by: STUDENT IN AN ORGANIZED HEALTH CARE EDUCATION/TRAINING PROGRAM

## 2019-10-21 PROCEDURE — 88341 IMHCHEM/IMCYTCHM EA ADD ANTB: CPT | Performed by: OTOLARYNGOLOGY

## 2019-10-21 PROCEDURE — 25000128 H RX IP 250 OP 636: Performed by: ANESTHESIOLOGY

## 2019-10-21 PROCEDURE — 25000125 ZZHC RX 250: Performed by: STUDENT IN AN ORGANIZED HEALTH CARE EDUCATION/TRAINING PROGRAM

## 2019-10-21 PROCEDURE — 25000128 H RX IP 250 OP 636: Performed by: STUDENT IN AN ORGANIZED HEALTH CARE EDUCATION/TRAINING PROGRAM

## 2019-10-21 PROCEDURE — 82962 GLUCOSE BLOOD TEST: CPT

## 2019-10-21 PROCEDURE — 88342 IMHCHEM/IMCYTCHM 1ST ANTB: CPT | Performed by: OTOLARYNGOLOGY

## 2019-10-21 PROCEDURE — 25000128 H RX IP 250 OP 636: Performed by: NURSE ANESTHETIST, CERTIFIED REGISTERED

## 2019-10-21 PROCEDURE — 40000170 ZZH STATISTIC PRE-PROCEDURE ASSESSMENT II: Performed by: OTOLARYNGOLOGY

## 2019-10-21 PROCEDURE — 88332 PATH CONSLTJ SURG EA ADD BLK: CPT | Performed by: OTOLARYNGOLOGY

## 2019-10-21 PROCEDURE — 71000014 ZZH RECOVERY PHASE 1 LEVEL 2 FIRST HR: Performed by: OTOLARYNGOLOGY

## 2019-10-21 PROCEDURE — 88305 TISSUE EXAM BY PATHOLOGIST: CPT | Performed by: OTOLARYNGOLOGY

## 2019-10-21 PROCEDURE — 25000128 H RX IP 250 OP 636: Performed by: OTOLARYNGOLOGY

## 2019-10-21 PROCEDURE — 36000064 ZZH SURGERY LEVEL 4 EA 15 ADDTL MIN - UMMC: Performed by: OTOLARYNGOLOGY

## 2019-10-21 PROCEDURE — 27210794 ZZH OR GENERAL SUPPLY STERILE: Performed by: OTOLARYNGOLOGY

## 2019-10-21 PROCEDURE — 25000125 ZZHC RX 250: Performed by: OTOLARYNGOLOGY

## 2019-10-21 PROCEDURE — 37000008 ZZH ANESTHESIA TECHNICAL FEE, 1ST 30 MIN: Performed by: OTOLARYNGOLOGY

## 2019-10-21 PROCEDURE — 88331 PATH CONSLTJ SURG 1 BLK 1SPC: CPT | Performed by: OTOLARYNGOLOGY

## 2019-10-21 PROCEDURE — 88307 TISSUE EXAM BY PATHOLOGIST: CPT | Performed by: OTOLARYNGOLOGY

## 2019-10-21 PROCEDURE — 37000009 ZZH ANESTHESIA TECHNICAL FEE, EACH ADDTL 15 MIN: Performed by: OTOLARYNGOLOGY

## 2019-10-21 RX ORDER — EPHEDRINE SULFATE 50 MG/ML
INJECTION, SOLUTION INTRAMUSCULAR; INTRAVENOUS; SUBCUTANEOUS PRN
Status: DISCONTINUED | OUTPATIENT
Start: 2019-10-21 | End: 2019-10-21

## 2019-10-21 RX ORDER — SODIUM CHLORIDE, SODIUM LACTATE, POTASSIUM CHLORIDE, CALCIUM CHLORIDE 600; 310; 30; 20 MG/100ML; MG/100ML; MG/100ML; MG/100ML
INJECTION, SOLUTION INTRAVENOUS CONTINUOUS
Status: DISCONTINUED | OUTPATIENT
Start: 2019-10-21 | End: 2019-10-21 | Stop reason: HOSPADM

## 2019-10-21 RX ORDER — CEFAZOLIN SODIUM 2 G/100ML
2 INJECTION, SOLUTION INTRAVENOUS
Status: COMPLETED | OUTPATIENT
Start: 2019-10-21 | End: 2019-10-21

## 2019-10-21 RX ORDER — SODIUM CHLORIDE, SODIUM LACTATE, POTASSIUM CHLORIDE, CALCIUM CHLORIDE 600; 310; 30; 20 MG/100ML; MG/100ML; MG/100ML; MG/100ML
INJECTION, SOLUTION INTRAVENOUS CONTINUOUS PRN
Status: DISCONTINUED | OUTPATIENT
Start: 2019-10-21 | End: 2019-10-21

## 2019-10-21 RX ORDER — LIDOCAINE HYDROCHLORIDE AND EPINEPHRINE 10; 10 MG/ML; UG/ML
INJECTION, SOLUTION INFILTRATION; PERINEURAL PRN
Status: DISCONTINUED | OUTPATIENT
Start: 2019-10-21 | End: 2019-10-21 | Stop reason: HOSPADM

## 2019-10-21 RX ORDER — MEPERIDINE HYDROCHLORIDE 25 MG/ML
12.5 INJECTION INTRAMUSCULAR; INTRAVENOUS; SUBCUTANEOUS
Status: DISCONTINUED | OUTPATIENT
Start: 2019-10-21 | End: 2019-10-21 | Stop reason: HOSPADM

## 2019-10-21 RX ORDER — AMOXICILLIN 250 MG
1-2 CAPSULE ORAL 2 TIMES DAILY
Qty: 30 TABLET | Refills: 0 | Status: SHIPPED | OUTPATIENT
Start: 2019-10-21 | End: 2020-08-21

## 2019-10-21 RX ORDER — LIDOCAINE HYDROCHLORIDE 20 MG/ML
INJECTION, SOLUTION INFILTRATION; PERINEURAL PRN
Status: DISCONTINUED | OUTPATIENT
Start: 2019-10-21 | End: 2019-10-21

## 2019-10-21 RX ORDER — MAGNESIUM HYDROXIDE 1200 MG/15ML
LIQUID ORAL PRN
Status: DISCONTINUED | OUTPATIENT
Start: 2019-10-21 | End: 2019-10-21 | Stop reason: HOSPADM

## 2019-10-21 RX ORDER — ONDANSETRON 4 MG/1
4-8 TABLET, ORALLY DISINTEGRATING ORAL EVERY 8 HOURS PRN
Qty: 4 TABLET | Refills: 0 | Status: SHIPPED | OUTPATIENT
Start: 2019-10-21 | End: 2019-12-23

## 2019-10-21 RX ORDER — NALOXONE HYDROCHLORIDE 0.4 MG/ML
.1-.4 INJECTION, SOLUTION INTRAMUSCULAR; INTRAVENOUS; SUBCUTANEOUS
Status: DISCONTINUED | OUTPATIENT
Start: 2019-10-21 | End: 2019-10-21

## 2019-10-21 RX ORDER — ONDANSETRON 2 MG/ML
INJECTION INTRAMUSCULAR; INTRAVENOUS PRN
Status: DISCONTINUED | OUTPATIENT
Start: 2019-10-21 | End: 2019-10-21

## 2019-10-21 RX ORDER — OXYCODONE HYDROCHLORIDE 5 MG/1
5 TABLET ORAL EVERY 6 HOURS PRN
Qty: 10 TABLET | Refills: 0 | Status: SHIPPED | OUTPATIENT
Start: 2019-10-21 | End: 2019-10-24

## 2019-10-21 RX ORDER — ACETAMINOPHEN 325 MG/1
650 TABLET ORAL EVERY 4 HOURS PRN
Qty: 50 TABLET | Refills: 0 | Status: SHIPPED | OUTPATIENT
Start: 2019-10-21 | End: 2020-08-21

## 2019-10-21 RX ORDER — ONDANSETRON 2 MG/ML
4 INJECTION INTRAMUSCULAR; INTRAVENOUS EVERY 30 MIN PRN
Status: DISCONTINUED | OUTPATIENT
Start: 2019-10-21 | End: 2019-10-21 | Stop reason: HOSPADM

## 2019-10-21 RX ORDER — NALOXONE HYDROCHLORIDE 0.4 MG/ML
.1-.4 INJECTION, SOLUTION INTRAMUSCULAR; INTRAVENOUS; SUBCUTANEOUS
Status: DISCONTINUED | OUTPATIENT
Start: 2019-10-21 | End: 2019-10-21 | Stop reason: HOSPADM

## 2019-10-21 RX ORDER — ONDANSETRON 4 MG/1
4 TABLET, ORALLY DISINTEGRATING ORAL EVERY 30 MIN PRN
Status: DISCONTINUED | OUTPATIENT
Start: 2019-10-21 | End: 2019-10-21 | Stop reason: HOSPADM

## 2019-10-21 RX ORDER — PROPOFOL 10 MG/ML
INJECTION, EMULSION INTRAVENOUS CONTINUOUS PRN
Status: DISCONTINUED | OUTPATIENT
Start: 2019-10-21 | End: 2019-10-21

## 2019-10-21 RX ORDER — PROPOFOL 10 MG/ML
INJECTION, EMULSION INTRAVENOUS PRN
Status: DISCONTINUED | OUTPATIENT
Start: 2019-10-21 | End: 2019-10-21

## 2019-10-21 RX ORDER — FENTANYL CITRATE 50 UG/ML
25-50 INJECTION, SOLUTION INTRAMUSCULAR; INTRAVENOUS EVERY 5 MIN PRN
Status: DISCONTINUED | OUTPATIENT
Start: 2019-10-21 | End: 2019-10-21 | Stop reason: HOSPADM

## 2019-10-21 RX ORDER — MINERAL OIL/HYDROPHIL PETROLAT
OINTMENT (GRAM) TOPICAL
Qty: 50 G | Refills: 0 | Status: SHIPPED | OUTPATIENT
Start: 2019-10-21 | End: 2020-08-21

## 2019-10-21 RX ORDER — DEXAMETHASONE SODIUM PHOSPHATE 4 MG/ML
INJECTION, SOLUTION INTRA-ARTICULAR; INTRALESIONAL; INTRAMUSCULAR; INTRAVENOUS; SOFT TISSUE PRN
Status: DISCONTINUED | OUTPATIENT
Start: 2019-10-21 | End: 2019-10-21

## 2019-10-21 RX ORDER — FENTANYL CITRATE 50 UG/ML
INJECTION, SOLUTION INTRAMUSCULAR; INTRAVENOUS PRN
Status: DISCONTINUED | OUTPATIENT
Start: 2019-10-21 | End: 2019-10-21

## 2019-10-21 RX ORDER — GLYCOPYRROLATE 0.2 MG/ML
INJECTION, SOLUTION INTRAMUSCULAR; INTRAVENOUS PRN
Status: DISCONTINUED | OUTPATIENT
Start: 2019-10-21 | End: 2019-10-21

## 2019-10-21 RX ADMIN — GLYCOPYRROLATE 0.2 MG: 0.2 INJECTION, SOLUTION INTRAMUSCULAR; INTRAVENOUS at 13:37

## 2019-10-21 RX ADMIN — FENTANYL CITRATE 50 MCG: 50 INJECTION, SOLUTION INTRAMUSCULAR; INTRAVENOUS at 15:06

## 2019-10-21 RX ADMIN — PHENYLEPHRINE HYDROCHLORIDE 100 MCG: 10 INJECTION INTRAVENOUS at 13:51

## 2019-10-21 RX ADMIN — ONDANSETRON 4 MG: 2 INJECTION INTRAMUSCULAR; INTRAVENOUS at 15:33

## 2019-10-21 RX ADMIN — Medication 10 MG: at 13:32

## 2019-10-21 RX ADMIN — PHENYLEPHRINE HYDROCHLORIDE 100 MCG: 10 INJECTION INTRAVENOUS at 14:14

## 2019-10-21 RX ADMIN — PHENYLEPHRINE HYDROCHLORIDE 100 MCG: 10 INJECTION INTRAVENOUS at 13:36

## 2019-10-21 RX ADMIN — Medication 80 MG: at 13:16

## 2019-10-21 RX ADMIN — LIDOCAINE HYDROCHLORIDE 80 MG: 20 INJECTION, SOLUTION INFILTRATION; PERINEURAL at 13:19

## 2019-10-21 RX ADMIN — FENTANYL CITRATE 50 MCG: 50 INJECTION, SOLUTION INTRAMUSCULAR; INTRAVENOUS at 13:20

## 2019-10-21 RX ADMIN — GLYCOPYRROLATE 0.2 MG: 0.2 INJECTION, SOLUTION INTRAMUSCULAR; INTRAVENOUS at 13:32

## 2019-10-21 RX ADMIN — FENTANYL CITRATE 50 MCG: 50 INJECTION, SOLUTION INTRAMUSCULAR; INTRAVENOUS at 15:15

## 2019-10-21 RX ADMIN — CEFAZOLIN SODIUM 1 G: 2 INJECTION, SOLUTION INTRAVENOUS at 15:10

## 2019-10-21 RX ADMIN — SODIUM CHLORIDE, POTASSIUM CHLORIDE, SODIUM LACTATE AND CALCIUM CHLORIDE: 600; 310; 30; 20 INJECTION, SOLUTION INTRAVENOUS at 13:02

## 2019-10-21 RX ADMIN — HYDROMORPHONE HYDROCHLORIDE 0.5 MG: 1 INJECTION, SOLUTION INTRAMUSCULAR; INTRAVENOUS; SUBCUTANEOUS at 16:00

## 2019-10-21 RX ADMIN — REMIFENTANIL HYDROCHLORIDE 0.3 MCG/KG/MIN: 1 INJECTION, POWDER, LYOPHILIZED, FOR SOLUTION INTRAVENOUS at 13:18

## 2019-10-21 RX ADMIN — FENTANYL CITRATE 50 MCG: 50 INJECTION, SOLUTION INTRAMUSCULAR; INTRAVENOUS at 16:28

## 2019-10-21 RX ADMIN — FENTANYL CITRATE 100 MCG: 50 INJECTION, SOLUTION INTRAMUSCULAR; INTRAVENOUS at 16:03

## 2019-10-21 RX ADMIN — MIDAZOLAM 1 MG: 1 INJECTION INTRAMUSCULAR; INTRAVENOUS at 13:09

## 2019-10-21 RX ADMIN — PROPOFOL 120 MCG/KG/MIN: 10 INJECTION, EMULSION INTRAVENOUS at 13:18

## 2019-10-21 RX ADMIN — DEXAMETHASONE SODIUM PHOSPHATE 4 MG: 4 INJECTION, SOLUTION INTRA-ARTICULAR; INTRALESIONAL; INTRAMUSCULAR; INTRAVENOUS; SOFT TISSUE at 13:56

## 2019-10-21 RX ADMIN — PROPOFOL 40 MG: 10 INJECTION, EMULSION INTRAVENOUS at 13:19

## 2019-10-21 RX ADMIN — PROPOFOL 120 MG: 10 INJECTION, EMULSION INTRAVENOUS at 13:15

## 2019-10-21 RX ADMIN — CEFAZOLIN SODIUM 2 G: 2 INJECTION, SOLUTION INTRAVENOUS at 13:10

## 2019-10-21 ASSESSMENT — MIFFLIN-ST. JEOR: SCORE: 1475

## 2019-10-21 NOTE — ANESTHESIA CARE TRANSFER NOTE
Patient: Barbara Camacho    Procedure(s):  Right Thyroidectomy    Diagnosis: Thyroid Nodules  Diagnosis Additional Information: No value filed.    Anesthesia Type:   General     Note:  Airway :Nasal Cannula  Patient transferred to:PACU  Comments: Anesthesia Care Transfer Note      Transfer to:  PACU    Patient Vital Signs:  Stable    Airway:  None    Patient transported to PACU with supplemental O2.  Patient alert and breathing comfortably.  VSS.  Care transferred with report to PACU RN.    Ha Acuña CRNAHandoff Report: Identifed the Patient, Identified the Reponsible Provider, Reviewed the pertinent medical history, Discussed the surgical course, Reviewed Intra-OP anesthesia mangement and issues during anesthesia, Set expectations for post-procedure period and Allowed opportunity for questions and acknowledgement of understanding      Vitals: (Last set prior to Anesthesia Care Transfer)    CRNA VITALS  10/21/2019 1527 - 10/21/2019 1606      10/21/2019             Resp Rate (observed):  (!) 5                Electronically Signed By: DWIGHT Lopez CRNA  October 21, 2019  4:06 PM

## 2019-10-21 NOTE — OR NURSING
Pt instructed that she is planned to go home after surgery today.  Mom, Homa Larsen to drive pt home and Room-mate Abbey to be with pt for 24 hours post op.

## 2019-10-21 NOTE — DISCHARGE INSTRUCTIONS
[facility]  Same-Day Surgery   Adult Discharge Orders & Instructions     For 24 hours after surgery    1. Get plenty of rest.  A responsible adult must stay with you for at least 24 hours after you leave the hospital.   2. Do not drive or use heavy equipment.  If you have weakness or tingling, don't drive or use heavy equipment until this feeling goes away.  3. Do not drink alcohol.  4. Avoid strenuous or risky activities.  Ask for help when climbing stairs.   5. You may feel lightheaded.  IF so, sit for a few minutes before standing.  Have someone help you get up.   6. If you have nausea (feel sick to your stomach): Drink only clear liquids such as apple juice, ginger ale, broth or 7-Up.  Rest may also help.  Be sure to drink enough fluids.  Move to a regular diet as you feel able.  7. You may have a slight fever. Call the doctor if your fever is over 100 F (37.7 C) (taken under the tongue) or lasts longer than 24 hours.  8. You may have a dry mouth, a sore throat, muscle aches or trouble sleeping.  These should go away after 24 hours.  9. Do not make important or legal decisions.   Call your doctor for any of the followin.  Signs of infection (fever, growing tenderness at the surgery site, a large amount of drainage or bleeding, severe pain, foul-smelling drainage, redness, swelling).    2. It has been over 8 to 10 hours since surgery and you are still not able to urinate (pass water).    3.  Headache for over 24 hours.    To contact a doctor, call Dr Nichols's office at [phone] (ENT/Otolaryngology Clinic) or:        [phone] and ask for the resident on call for   ENT/Otolaryngology (answered 24 hours a day)      Emergency Department:    [facility]: [phone]       (TTY for hearing impaired: [phone])    [facility]: [phone]       (TTY for hearing impaired: [phone])

## 2019-10-21 NOTE — ANESTHESIA POSTPROCEDURE EVALUATION
Anesthesia POST Procedure Evaluation    Patient: Barbara Camacho   MRN:     [id number] Gender:   female   Age:    48 year old :      1970        Preoperative Diagnosis: Thyroid Nodules   Procedure(s):  Right Thyroidectomy   Postop Comments: No value filed.       Anesthesia Type:  Not documented  General    Reportable Event: NO     PAIN: Uncomplicated   Sign Out status: Comfortable, Well controlled pain     PONV: No PONV   Sign Out status:  No Nausea or Vomiting     Neuro/Psych: Uneventful perioperative course   Sign Out Status: Preoperative baseline; Age appropriate mentation     Airway/Resp.: Uneventful perioperative course   Sign Out Status: Non labored breathing, age appropriate RR; Resp. Status within EXPECTED Parameters     CV: Uneventful perioperative course   Sign Out status: Appropriate BP and perfusion indices; Appropriate HR/Rhythm     Disposition:   Sign Out in:  PACU  Disposition:  Phase II; Home  Recovery Course: Uneventful  Follow-Up: Not required           Last Anesthesia Record Vitals:  CRNA VITALS  10/21/2019 1527 - 10/21/2019 1627      10/21/2019             Resp Rate (observed):  (!) 5          Last PACU Vitals:  Vitals Value Taken Time   /82 10/21/2019  5:00 PM   Temp 36.9  C (98.4  F) 10/21/2019  4:30 PM   Pulse 77 10/21/2019  5:00 PM   Resp 15 10/21/2019  4:45 PM   SpO2 94 % 10/21/2019  5:08 PM   Temp src     NIBP     Pulse     SpO2     Resp     Temp     Ht Rate     Temp 2     Vitals shown include unvalidated device data.      Electronically Signed By: Titi Serrano MD, 2019, 5:09 PM

## 2019-10-21 NOTE — OP NOTE
Sep 18, 2019    Pre-op Diagnosis:  Right thyroid goiter with thyroid nodule  Post-op Diagnosis:   Right thyroid goiter with thyroid nodule  Procedure:   Right hemithyroidectomy    Surgeons:   Cleve Ugarte MD; Lobo Aranda MD; Glenroy Nichols MD  Anesthesia:  GETA  EBL:  40 cc  Drains:  MELISA x 1      Complications:   None   Specimens:   Right hemithyroid    Findings: Very large right-sided goiter measuring 9 x 8 x 5 cm on the right side.  The hemithyroid appear to be replaced by a large mass, but the mass had a smooth capsule.  The recurrent laryngeal nerve stimulated at the end of the procedure.  The inferior and superior parathyroids were identified and preserved.    Indications:  Ms. Camacho is a 48 year old female with a large right thyroid mass.  The mass deviates the trachea to the left side of the neck.  A fine-needle aspiration biopsy did not demonstrate an obvious cancer and showed only a follicular neoplasm.  She is here for a right hemithyroidectomy.      Procedure:  After consent, the patient was brought to the operating room and placed in the supine position.  Following induction, the patient was intubated orotracheally.  Monitoring lines were placed as appropriate. The patient was intubated with a EMG endotracheal tube, and the leads were connected to the nerve integrity monitoring system.  Impedances were excellent.  The patient was left unturned, with the tube taped in the midline.  The head and neck were extended.  A 8 cm right neck incision was marked and infiltrated with 1% Xylocaine with 1:100,000 epinephrine solution in a skin crease which had been marked with the patient preoperatively. I made a large incision from the start because I anticipated that the dissection would be difficult, and that it would be almost impossible to deliver the large mass through a small incision.  The wound was prepped and draped in standard sterile fashion.      The incision was made and skin flaps were elevated.  The strap  muscles were  in the midline and the thyroid was exposed. The right thyroid lobe was exposed with blunt dissection, using bipolar cautery to assure hemostasis.  The medial strap muscles had to be divided for adequate lateral exposure, and were marked prior to division so that they could be reapproximated at the end of the procedure.  The lobe was dissected in the capsular plane.  The upper pole was then freed, avoiding injury to the external branch of the superior laryngeal nerve.  The upper pole artery and veins were ligated and divided.  The lateral thyroid lobe was gently mobilized with retraction and blunt dissectoin.  Inferiorly, the terminal branches of the inferior thyroid artery were divided as they entered the thyroid, to avoid injury to the parathyroid vascular supply.  The lobe was dissected from lateral to medial, avoiding the course of the nerve as it entered the crico-thyroid joint.  The course of the recurrent nerve was identified just inferior to the cricothyroid joint, and it stimulated well and we therefore did not dissect it.  Hemostasis was achieved with bipolar cautery and fine silk ligatures.   We identified the right superior and inferior parathyroids as the dissection proceeded and preserved these successfully.  The specimen was divided at the isthmus and the lobe was delivered.      Hemostasis was assured meticulously.  The straps were reapproximated vertically, and then the midline raphae was loosely approximated anteriorly.  A #10 David-Shaikh drain was placed to provide evacuation of bleeding. The incisions were then closed with 3-0 vicryl in the platysma layer, 5-0 rapid-absorbing gut in the dermis, and a running 5-0 nylon in the skin.  The patient tolerated the procedure well.  I was present for the entire procedure.  I was immediately available for the skin closure.  No complications were encountered.

## 2019-10-21 NOTE — PROGRESS NOTES
I have reviewed Ms Camacho's history, pathology, and imaging.  With a definitive diagnosis of cancer, I believe that the best approach is to perform a right hemithyroidectomy first today.  I have explained this to Ms. Camacho and she agrees.  She appreciates that this would reduce surgical time and the risk of injury.  However, she understands that if this turns out to be a follicular cancer, she may need to return for completion thyroidectomy.      She was comfortable with this plan and we will proceed today with plans for right hemithyroidectomy alone, unless surgical findings dictate otherwise.

## 2019-10-23 ENCOUNTER — TELEPHONE (OUTPATIENT)
Dept: OTOLARYNGOLOGY | Facility: CLINIC | Age: 49
End: 2019-10-23

## 2019-10-23 NOTE — TELEPHONE ENCOUNTER
Pt called regarding drain output. Reports 80 mL output in the last 24hrs. Direct line given for call-back when output is <30 mL in 24hr period.     Natice Schwab, RN BSN

## 2019-10-23 NOTE — TELEPHONE ENCOUNTER
"[facility] Call Center    Phone Message    May a detailed message be left on voicemail: yes    Reason for Call: Other: patient calling with questions regarding her drainage tube, Patient is unsure when she needs to have that removed. patient states she was \"out of it\" when talked to after the appointment and also has questions regarding pain medication.      Action Taken: Message routed to:  [facility] ([facility] ENT    "

## 2019-10-24 ENCOUNTER — TELEPHONE (OUTPATIENT)
Dept: OTOLARYNGOLOGY | Facility: CLINIC | Age: 49
End: 2019-10-24

## 2019-10-24 DIAGNOSIS — E04.1 THYROID NODULE: ICD-10-CM

## 2019-10-24 RX ORDER — OXYCODONE HYDROCHLORIDE 5 MG/1
5 TABLET ORAL EVERY 6 HOURS PRN
Qty: 10 TABLET | Refills: 0 | Status: SHIPPED | OUTPATIENT
Start: 2019-10-24 | End: 2019-12-23

## 2019-10-24 RX ORDER — OXYCODONE HYDROCHLORIDE 5 MG/1
5 TABLET ORAL EVERY 6 HOURS PRN
Qty: 10 TABLET | Refills: 0 | Status: SHIPPED | OUTPATIENT
Start: 2019-10-24 | End: 2019-10-24

## 2019-10-24 NOTE — TELEPHONE ENCOUNTER
Pt called to discuss drain output; stated that she has had 22mL out since 4 am this morning. Stated that the drain should stay in for another day. Pt reports increased pain and the need for a refill on her Oxycodone. Discussed with Dr. Nichols and he is fine with the patient having 1 more refill. NOAH Au will e-prescribe Oxycodone to local pharmacy. Direct line given for additional questions/concerns.     Natice Schwab, RN BSN

## 2019-10-25 ENCOUNTER — ALLIED HEALTH/NURSE VISIT (OUTPATIENT)
Dept: OTOLARYNGOLOGY | Facility: CLINIC | Age: 49
End: 2019-10-25
Payer: COMMERCIAL

## 2019-10-25 ENCOUNTER — TELEPHONE (OUTPATIENT)
Dept: OTOLARYNGOLOGY | Facility: CLINIC | Age: 49
End: 2019-10-25

## 2019-10-25 DIAGNOSIS — E04.1 THYROID NODULE: Primary | ICD-10-CM

## 2019-10-25 NOTE — PROGRESS NOTES
Patient in clinic today for MELISA drain removal. Drain output in last 24 hours was 17 ml, indicating MELISA removal as it was less than 30 ml in 24 hours. Incision site evaluated and it was clean, dry and intact without evidence of redness, swelling or drainage. MELISA site was cleansed, suture around tubing was removed, drain bulb was opened, and drain was removed easily. Patient educated on signs and symptoms of infection, site care and provided number to call with further questions or concerns. Patient verbalized understanding and was encouraged to call with any further questions or concerns    Natice Schwab, RN, BSN

## 2019-10-25 NOTE — TELEPHONE ENCOUNTER
Pt called regarding drain output. No answer. Voicemail left asking for call back on direct line to discuss.     Natice Schwab, RN BSN

## 2019-10-29 LAB — COPATH REPORT: NORMAL

## 2019-10-30 ENCOUNTER — TELEPHONE (OUTPATIENT)
Dept: OTOLARYNGOLOGY | Facility: CLINIC | Age: 49
End: 2019-10-30

## 2019-10-30 ENCOUNTER — PREP FOR PROCEDURE (OUTPATIENT)
Dept: OTOLARYNGOLOGY | Facility: CLINIC | Age: 49
End: 2019-10-30

## 2019-10-30 DIAGNOSIS — C73 FOLLICULAR CARCINOMA OF THYROID (H): Primary | ICD-10-CM

## 2019-10-30 NOTE — TELEPHONE ENCOUNTER
Patient is scheduled for surgery with Dr. Nichols      Spoke or left message with: Patient    Date of Surgery: 01/13/20    Location [facility]     H&P: Scheduled with PCP    Post op: 1/22/20 at 9:40 am     Additional imaging/appointments: NA    Surgery packet sent: mailed 10/30/19     Additional comments:  The patient is aware they need a pre-op at least a couple of weeks before surgery date. We went over the patient needing a  and someone to stay with them for 24 hours after the surgery. The patient was given my direct number for any questions [phone]

## 2019-10-31 ENCOUNTER — TELEPHONE (OUTPATIENT)
Dept: OTOLARYNGOLOGY | Facility: CLINIC | Age: 49
End: 2019-10-31

## 2019-10-31 NOTE — TELEPHONE ENCOUNTER
Pt called regarding nurse visit scheduled today at 1000. No answer. Voicemail left at 1030 asking if she had planned to come to that appointment or if she needed to rescheduled for a later time. Direct line given for call-back.     Natice Schwab, RN BSN

## 2019-10-31 NOTE — TELEPHONE ENCOUNTER
Pt called regarding no-show for suture removal appointment today at 1000.     Sister Siomara answered the phone and explained that Barbara is in a meeting with her  that should be wrapping up soon. She has asked that appointment be rescheduled for tomorrow.     Barbara has been scheduled tomorrow at 1100 for suture removal and appointment to discuss pathology and next steps.     Direct line given for additional questions/concerns.     Natice Schwab, RN BSN

## 2019-11-01 ENCOUNTER — ALLIED HEALTH/NURSE VISIT (OUTPATIENT)
Dept: OTOLARYNGOLOGY | Facility: CLINIC | Age: 49
End: 2019-11-01
Payer: COMMERCIAL

## 2019-11-01 ENCOUNTER — TELEPHONE (OUTPATIENT)
Dept: OTOLARYNGOLOGY | Facility: CLINIC | Age: 49
End: 2019-11-01

## 2019-11-01 DIAGNOSIS — C73 FOLLICULAR CARCINOMA OF THYROID (H): Primary | ICD-10-CM

## 2019-11-01 NOTE — TELEPHONE ENCOUNTER
Pt called to f/o on no-show for nurse visit appointment today at 1100. No answer. Voicemail left asking for a call-back to discuss/reschedule; direct line given     Natice Schwab, RN BSN

## 2019-11-01 NOTE — PROGRESS NOTES
Patient in clinic today for Suture removal.  Incision site evaluated and it was clean, dry and intact without evidence of redness, swelling or drainage. Patient educated on signs and symptoms of infection, site care and provided number to call with further questions or concerns. Patient verbalized understanding and was encouraged to call with any further questions or concerns    Pathology results discussed.     SPECIMEN(S):   A: Delphian node   B: Right shelbi thyroid     FINAL DIAGNOSIS:   A. DELPHIAN NODE, EXCISION:   - One lymph node with sinus histiocytosis, negative for malignancy (0/1).     B. THYROID GLAND, RIGHT LOBE, HEMITHYROIDECTOMY:   - FOLLICULAR CARCINOMA WITH HURTHLE CELL FEATURES, 8.2 cm in greatest   dimension.   - Tumor invades in two blood vessels (focal angioinvasion present); focal   capsular invasion is also observed.   - Tumor is limited to the right lobe; there is no extrathyroidal   extension.   - Margins are negative for tumor; closest margin is anterior at less than   0.1 cm.   - AJCC staging pT3a pN0a.   - See synoptic report.     Explained that the standard treatment would be to have a completion thyroidectomy. Pt explained that she would like to wait until after the first of the year. I stated that would be fine as long as she met with Endocrinology prior to that. In-basket message sent to endocrinology schedulers. Pt also provided with the number to scheduled.     Direct line given for additional questions/concerns.       Natice Schwab, RN, BSN

## 2019-11-08 ENCOUNTER — HEALTH MAINTENANCE LETTER (OUTPATIENT)
Age: 49
End: 2019-11-08

## 2019-11-12 ENCOUNTER — TELEPHONE (OUTPATIENT)
Dept: OTOLARYNGOLOGY | Facility: CLINIC | Age: 49
End: 2019-11-12

## 2019-11-12 NOTE — TELEPHONE ENCOUNTER
Voicemail received from patient asking for a return to work letter. Letter drafted and sent to the provided email as pt had requested. Direct line given for additional questions/concerns.     Natice Schwab, RN BSN

## 2019-11-14 NOTE — TELEPHONE ENCOUNTER
Left a message for Barbara to call back ASAP in regards to rescheduling with Dr. Nichols.   Asked her to call back [phone].    For now patient has been rescheduled to the following week 1/20/2020. Will wait for patient to call back to confirm.   Always able to reschedule again.

## 2019-12-05 ENCOUNTER — TELEPHONE (OUTPATIENT)
Dept: ENDOCRINOLOGY | Facility: CLINIC | Age: 49
End: 2019-12-05

## 2019-12-05 NOTE — TELEPHONE ENCOUNTER
[facility] Call Center    Phone Message    May a detailed message be left on voicemail: yes    Reason for Call: Other: Sending to Nurse triage to evaluate when patient should be seen.  There is an appointment scheduling history- but patient has failed to make appointment.      Patient states that she had surgery on 10.21.2019.  She states that radiation needs to be started prior to her next surgery scheduled for 1.20.2020.    Action Taken: Message routed to:  [facility] lela sharp

## 2019-12-09 NOTE — TELEPHONE ENCOUNTER
Patient has no showed for Dr. Rabago & Dr. Stephens she can be scheduled in a new patient slot when available.

## 2019-12-11 NOTE — TELEPHONE ENCOUNTER
LVM for pt to schedule 1st available NEN. Pt can be added to the waitlist here if new appt is not within requested timeframe.

## 2019-12-13 ENCOUNTER — PREP FOR PROCEDURE (OUTPATIENT)
Dept: OTOLARYNGOLOGY | Facility: CLINIC | Age: 49
End: 2019-12-13

## 2019-12-13 ENCOUNTER — TELEPHONE (OUTPATIENT)
Dept: OTOLARYNGOLOGY | Facility: CLINIC | Age: 49
End: 2019-12-13

## 2019-12-13 NOTE — TELEPHONE ENCOUNTER
Pt called regarding scheduling of completion thyroidectomy. No answer. Voicemail left with direct line asking for call-back asap.     Natice Schwab, RN BSN

## 2019-12-17 ENCOUNTER — TELEPHONE (OUTPATIENT)
Dept: OTOLARYNGOLOGY | Facility: CLINIC | Age: 49
End: 2019-12-17

## 2019-12-17 NOTE — TELEPHONE ENCOUNTER
Talked to pt and helped schedule her to see Dr. Machado on Monday, 12/23/19 at 1400 for a consult referred by Glenroy Nichols MD. Verified date, time, and location w/ pt. Pt verbalized understanding and okay w/ plan.      Radha Daley LPN

## 2019-12-19 NOTE — TELEPHONE ENCOUNTER
FUTURE VISIT INFORMATION      SURGERY INFORMATION:    Date: 1/7/20    Location: [facility]    Surgeon:  Tess Machado    Anesthesia Type:  General    RECORDS REQUESTED FROM:       Primary Care Provider: Jj Miguel MD- [facility]    Pertinent Medical History: Systolic ejection murmur

## 2019-12-23 ENCOUNTER — PRE VISIT (OUTPATIENT)
Dept: SURGERY | Facility: CLINIC | Age: 49
End: 2019-12-23

## 2019-12-23 ENCOUNTER — OFFICE VISIT (OUTPATIENT)
Dept: OTOLARYNGOLOGY | Facility: CLINIC | Age: 49
End: 2019-12-23

## 2019-12-23 ENCOUNTER — TELEPHONE (OUTPATIENT)
Dept: OTOLARYNGOLOGY | Facility: CLINIC | Age: 49
End: 2019-12-23

## 2019-12-23 VITALS
HEART RATE: 84 BPM | WEIGHT: 193 LBS | SYSTOLIC BLOOD PRESSURE: 115 MMHG | DIASTOLIC BLOOD PRESSURE: 77 MMHG | RESPIRATION RATE: 18 BRPM | BODY MASS INDEX: 34.2 KG/M2 | HEIGHT: 63 IN

## 2019-12-23 DIAGNOSIS — C73 FOLLICULAR CARCINOMA OF THYROID (H): Primary | ICD-10-CM

## 2019-12-23 ASSESSMENT — PAIN SCALES - GENERAL: PAINLEVEL: NO PAIN (0)

## 2019-12-23 ASSESSMENT — MIFFLIN-ST. JEOR: SCORE: 1474.57

## 2019-12-23 NOTE — PROGRESS NOTES
ENT SURGERY - NEW PATIENT OFFICE VISIT     HPI  Ms. Barbara Camacho is a 49 year old year-old female with a history of a right thyroid goiter s/p right thyroid lobectomy by Dr. Nichols on 10/21/19. 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 She was referred here for completion thyroidectomy. Patient denies any issues after her previous surgery including dysphagia or changes in voice. She has yet to see Endocrinology to discuss ARNDT.    Previsit Tests   Thyroid US (7/17/19) -  Impression:  Multinodular thyroid with large nodule in the right lobe measuring 8.5  cm. This meets TI-RADS 4 criteria, laterally suspicious. This has been  previously sampled and returned suspicious for follicular neoplasm. No  dedicated thyroid imaging other than procedural FNA to compare size.  Small nodule in the left lobe most consistent with a colloid nodule.    Surgical Pathology (10/21/19) -   FINAL DIAGNOSIS:   A. DELPHIAN NODE, EXCISION:   - One lymph node with sinus histiocytosis, negative for malignancy (0/1).     B. THYROID GLAND, RIGHT LOBE, HEMITHYROIDECTOMY:   - FOLLICULAR CARCINOMA WITH HURTHLE CELL FEATURES, 8.2 cm in greatest   dimension.   - Tumor invades in two blood vessels (focal angioinvasion present); focal   capsular invasion is also observed.   - Tumor is limited to the right lobe; there is no extrathyroidal   extension.   - Margins are negative for tumor; closest margin is anterior at less than   0.1 cm.   - AJCC staging pT3a pN0a.   - See synoptic report.     PMH    Past Medical History:   Diagnosis Date     Anemia      Anxiety      Cancer (H)     ovarian cancer at age 26 - treated with right oophorectomy. no chemo/radiation     Gastroesophageal reflux disease      Substance abuse (H)      Thyroid nodule 2016    bx done at [facility]  Past Surgical History:   Procedure Laterality Date     APPENDECTOMY OPEN      angeles and  ovary removed all at same time as appy     CHOLECYSTECTOMY       FINE NEEDLE ASPIRATION  2019    thyroid     HERNIA REPAIR       LAPAROSCOPIC BYPASS GASTRIC  7/20/2011    Procedure:LAPAROSCOPIC BYPASS GASTRIC; Hiatal hernia repair; Surgeon:FRANKIE VUONG; Location:[facility] OR     OOPHORECTOMY  2000?     THYROIDECTOMY Right 10/21/2019    Procedure: Right Thyroidectomy;  Surgeon: Glenroy Nichols MD;  Location: [facility] OR        ETOH - Denies  TOB - Current smoker, 1/4-1/2 PPD  She works as a .    Physical examination  Well appearing  Non-labored on RA  Well perfused  Previous neck incision well healed, no palpable neck masses    Flexible laryngoscopy attempted in clinic, procedure aborted due to pain and inability to pass inferior turbinate      IMPRESSION  49 year old year-old female with 8.2 cm follicular carcinoma of the right thyroid s/p right thyroid lobectomy, now in need of left thyroid lobectomy for completion thyroidectomy. Discussed the risks and benefits of the procedure with the patient and she elects to proceed.    PLAN  - Plan for left thyroid lobectomy, completion thyroidectomy  - Will need to see Endocrinology in follow up for ARNDT  - Will need Pre-op H&P      Seen with staff, Dr. Jeannette Guzman, PGY-3  General Surgery    I spent >30 minutes in the care, exam and consultation of this patient for surgical options for completion thyroidectomy for thyroid cancer. I agree with the residents note, exam and plan. Discussed completion thyroidectomy with the patient including the risks in detail . All questions and concerns addressed.    Tess Machado MD

## 2019-12-23 NOTE — NURSING NOTE
Teaching Flowsheet - ENT  Teaching Topic: Completion Thyroidectomy  Relevant Diagnosis: Follicular Carcinoma of Thyroid  Person(s) involved in teaching: Patient    Motivation Level  Asks Questions: Yes  Eager to Learn: Yes  Cooperative: Yes  Receptive (willing/able to accept information): Yes  Comments: Reviewed pre-op H and P, NPO prior to surgery, pre-op scrub (mailed Hibiclens), reviewed post-op cares, activity and pain.    Patient Demonstrates Understanding of the Following:  Reason for the appointment, diagnosis, and treatment plan: Yes  Knowledge of proper use of medications and conditions for which they are ordered (with special attention to potential side effects or drug interactions) - Stop aspirin products 1 week before surgery: Yes  Which situations necessitate calling provider and whom to contact: Yes  Nutritional needs and diet plan: Yes  Pain management techniques: Yes  Patient instructed on hand hygiene: Yes  How and when to access community resources: Yes    Infection Prevention  Patient demonstrates understanding of the following:  Surgical procedure site care taught: Yes  Signs and symptoms of infection taught: Yes  Wound care taught: Yes  Instructional Materials Used/Given: Pre-op booklet (mailed), verbal instruction      Patient left prior to teaching, performed over phone. Patient verbalized she would schedule H&P with PCP (PAC appt cancelled).      Radha Daley LPN

## 2019-12-23 NOTE — LETTER
12/23/2019       RE: Barbara Camacho  [address] [place] [address]     Dear Colleague,    Thank you for referring your patient, Barbara Camacho, to the [facility] EAR NO[facility] AND THROAT at [facility]. Please see a copy of my visit note below.    ENT SURGERY - NEW PATIENT OFFICE VISIT     HPI  Ms. Barbara Camacho is a 49 year old year-old female with a history of a right thyroid goiter s/p right thyroid lobectomy by Dr. Nichlos on 10/21/19. 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 She was referred here for completion thyroidectomy. Patient denies any issues after her previous surgery including dysphagia or changes in voice. She has yet to see Endocrinology to discuss ARNDT.    Previsit Tests   Thyroid US (7/17/19) -  Impression:  Multinodular thyroid with large nodule in the right lobe measuring 8.5  cm. This meets TI-RADS 4 criteria, laterally suspicious. This has been  previously sampled and returned suspicious for follicular neoplasm. No  dedicated thyroid imaging other than procedural FNA to compare size.  Small nodule in the left lobe most consistent with a colloid nodule.    Surgical Pathology (10/21/19) -   FINAL DIAGNOSIS:   A. DELPHIAN NODE, EXCISION:   - One lymph node with sinus histiocytosis, negative for malignancy (0/1).     B. THYROID GLAND, RIGHT LOBE, HEMITHYROIDECTOMY:   - FOLLICULAR CARCINOMA WITH HURTHLE CELL FEATURES, 8.2 cm in greatest   dimension.   - Tumor invades in two blood vessels (focal angioinvasion present); focal   capsular invasion is also observed.   - Tumor is limited to the right lobe; there is no extrathyroidal   extension.   - Margins are negative for tumor; closest margin is anterior at less than   0.1 cm.   - AJCC staging pT3a pN0a.   - See synoptic report.     PMH    Past Medical History:   Diagnosis Date     Anemia      Anxiety      Cancer (H)     ovarian cancer at age  26 - treated with right oophorectomy. no chemo/radiation     Gastroesophageal reflux disease      Substance abuse (H)      Thyroid nodule 2016    bx done at [facility]  Past Surgical History:   Procedure Laterality Date     APPENDECTOMY OPEN      angeles and ovary removed all at same time as appy     CHOLECYSTECTOMY       FINE NEEDLE ASPIRATION  2019    thyroid     HERNIA REPAIR       LAPAROSCOPIC BYPASS GASTRIC  7/20/2011    Procedure:LAPAROSCOPIC BYPASS GASTRIC; Hiatal hernia repair; Surgeon:FRANKIE VUONG; Location:[facility]     OOPHORECTOMY  2000?     THYROIDECTOMY Right 10/21/2019    Procedure: Right Thyroidectomy;  Surgeon: Glenroy Nichols MD;  Location: [facility] OR        ETOH - Denies  TOB - Current smoker, 1/4-1/2 PPD  She works as a .    Physical examination  Well appearing  Non-labored on RA  Well perfused  Previous neck incision well healed, no palpable neck masses    Flexible laryngoscopy attempted in clinic, procedure aborted due to pain and inability to pass inferior turbinate      IMPRESSION  49 year old year-old female with 8.2 cm follicular carcinoma of the right thyroid s/p right thyroid lobectomy, now in need of left thyroid lobectomy for completion thyroidectomy. Discussed the risks and benefits of the procedure with the patient and she elects to proceed.    PLAN  - Plan for left thyroid lobectomy, completion thyroidectomy  - Will need to see Endocrinology in follow up for ARNDT  - Will need Pre-op H&P      Seen with staff, Dr. Jeannette Guzman, PGY-3  General Surgery    I spent >30 minutes in the care, exam and consultation of this patient for surgical options for completion thyroidectomy for thyroid cancer. I agree with the residents note, exam and plan. Discussed completion thyroidectomy with the patient including the risks in detail . All questions and concerns addressed.    Tess Machado MD      Again, thank you for allowing me to participate in the care  of your patient.      Sincerely,    Tess Machado MD

## 2020-01-23 ENCOUNTER — TELEPHONE (OUTPATIENT)
Dept: OTOLARYNGOLOGY | Facility: CLINIC | Age: 50
End: 2020-01-23

## 2020-01-23 NOTE — TELEPHONE ENCOUNTER
[facility] Call Center    Phone Message    May a detailed message be left on voicemail: yes    Reason for Call: Other: Shyanne called from [facility], she said she was retuning a call to Glenroy Nichols's nurse. She said she was calling back with the fax number for the release of information, fax is [phone]. Shyanne said she is requesting any surgeries and all radiation information for this patient.     Action Taken: Other: ump ENT

## 2020-01-23 NOTE — TELEPHONE ENCOUNTER
Returned Shyanne's call; Shyanne stated she received a voicemail from Dr. Nichols's nurse yesterday stating that a release of information has been signed and that she would like to send Shyanne all of Barbara's medical information via email.     I explained that I am Dr. Nichols's only nurse and I did not make that phone call today nor do I see a release of information to [facility].     Shyanne stated she will look into this message and get back to me if additional questions/concerns arise.     Direct line given.     Natice Schwab, RN BSN

## 2020-02-03 ENCOUNTER — TELEPHONE (OUTPATIENT)
Dept: ONCOLOGY | Facility: CLINIC | Age: 50
End: 2020-02-03

## 2020-02-03 NOTE — TELEPHONE ENCOUNTER
Tobacco Treatment Program at the [facility] attempted to reach Ms. Camacho on 2/3/2020 regarding the tobacco cessation program to help Ms. Camacho to quit smoking. We will attempt to reach Ms. Camacho another time.     Jessica Landrum [facility]  Tobacco Treatment Specialist  PH: [phone]

## 2020-02-10 NOTE — TELEPHONE ENCOUNTER
Tobacco Treatment Program at the [facility] attempted to reach Ms. Camacho on 2/10/2020 regarding the tobacco cessation program to help Ms. Camacho to quit smoking. We will attempt to reach Ms. Camacho another time.     Jessica Landrum [facility]  Tobacco Treatment Specialist  PH: [phone]

## 2020-02-14 NOTE — TELEPHONE ENCOUNTER
Tobacco Treatment Program at the [facility] attempted to reach Ms. Camacho on 2/14/2020 regarding the tobacco cessation program to help Ms. Camacho to quit smoking. We will attempt to reach Ms. Camacho another time.     Jessica [facility]rum [facility]  Tobacco Treatment Specialist  PH: [phone]

## 2020-04-24 NOTE — TELEPHONE ENCOUNTER
Tobacco Treatment Program at the [facility] attempted to reach Ms. Camacho on 4/24/2020 regarding the tobacco cessation program to help Ms. Camacho to quit smoking. We will attempt to reach Ms. Camacho another time.     Jessica Landrum [facility]  Tobacco Treatment Specialist  PH: [phone]

## 2020-05-14 ENCOUNTER — TELEPHONE (OUTPATIENT)
Dept: OTOLARYNGOLOGY | Facility: CLINIC | Age: 50
End: 2020-05-14

## 2020-05-14 NOTE — TELEPHONE ENCOUNTER
"Pt called regarding scheduling of surgery. Multiple attempts to reach the patient made without success.     Contacted listed number; the person that answered stated that Barbara can no longer be reached on this number. I asked for her new number but called would not give. I explained that I was a nurse at the [facility] and needed to get in touch with Barbara regarding scheduling her completion thyroid surgery. Caller proceeded to hang up on me.     Called mothers number. No answer. Voicemail left with direct line for contact.     Temporary number contacted. No answer. Voicemail message states \"This is [facility] (drug and alcohol abuse treatment center) and we are unable to answer the phone\". Voicemail left with name and direct line for contact.     Will attempt to contact pt again in a few weeks.     Natice Schwab, RN BSN    "

## 2020-07-09 NOTE — TELEPHONE ENCOUNTER
Tobacco Treatment Program at the [facility] attempted to reach Ms. Camacho on 7/9/2020 regarding the tobacco cessation program to help Ms. Camacho to quit smoking. We will attempt to reach Ms. Camacho another time.     Jessica Landrum [facility]  Tobacco Treatment Specialist  PH: [phone]

## 2020-07-20 ENCOUNTER — TELEPHONE (OUTPATIENT)
Dept: OTOLARYNGOLOGY | Facility: CLINIC | Age: 50
End: 2020-07-20

## 2020-07-22 ENCOUNTER — TELEPHONE (OUTPATIENT)
Dept: OTOLARYNGOLOGY | Facility: CLINIC | Age: 50
End: 2020-07-22

## 2020-07-22 NOTE — TELEPHONE ENCOUNTER
Writer called patient to follow up with plan of care. Writer provided name and call back number. Awaiting call back.      Lissa Mccann LPN

## 2020-07-31 ENCOUNTER — PREP FOR PROCEDURE (OUTPATIENT)
Dept: OTOLARYNGOLOGY | Facility: CLINIC | Age: 50
End: 2020-07-31

## 2020-07-31 ENCOUNTER — TELEPHONE (OUTPATIENT)
Dept: OTOLARYNGOLOGY | Facility: CLINIC | Age: 50
End: 2020-07-31

## 2020-07-31 DIAGNOSIS — C73 FOLLICULAR CARCINOMA OF THYROID (H): Primary | ICD-10-CM

## 2020-07-31 NOTE — TELEPHONE ENCOUNTER
Patient called writer back in regards to voicemail's left. Patient states she didn't contact us before regarding this because she didn't have insurance. She states she has insurance now and would like to get the surgery scheduled. Writer to pass this to our scheduling team.    Patient acknowledged she would be getting call in the next couple of days to get this scheduled. Patient noted calm and and agreeable.    Lissa Mccann LPN

## 2020-07-31 NOTE — TELEPHONE ENCOUNTER
Writer called patient cell number and left voicemail for call back. Writer provided name and call back number. Writer then called patients mom regarding getting a hold of Barbara. She states she is on the other line with her and will let her know to listen to voicemail and call writer back. Writer awaiting call back at this time.      Lissa Mccann LPN

## 2020-07-31 NOTE — TELEPHONE ENCOUNTER
Called patient and scheduled her for completion thyroidectomy on 8/26/2020  With Dr. Machado.     Location of surgery: [facility] OR  Pre-Op Appt Date: PAC appointment made for patient.   Post-Op Appt Date: 2 weeks      Informed patient regarding the pre-op COVID-19 test required for surgery. Patient should wait for phone call from call center.

## 2020-08-03 NOTE — TELEPHONE ENCOUNTER
FUTURE VISIT INFORMATION      SURGERY INFORMATION:    pre-op surgery with Dr. Machado     RECORDS REQUESTED FROM:       Primary Care Provider: Jj Miguel MD- [employer]    Pertinent Medical History: systolic ejection murmur

## 2020-08-05 ENCOUNTER — TELEPHONE (OUTPATIENT)
Dept: OTOLARYNGOLOGY | Facility: CLINIC | Age: 50
End: 2020-08-05

## 2020-08-05 NOTE — TELEPHONE ENCOUNTER
Writer called patient to go over surgical information with her. Writer left name and call back number (direct line in clinic)    Lissa Mccann LPN

## 2020-08-06 ENCOUNTER — HOSPITAL ENCOUNTER (OUTPATIENT)
Facility: CLINIC | Age: 50
End: 2020-08-06
Attending: SURGERY | Admitting: SURGERY
Payer: COMMERCIAL

## 2020-08-06 DIAGNOSIS — Z11.59 ENCOUNTER FOR SCREENING FOR OTHER VIRAL DISEASES: Primary | ICD-10-CM

## 2020-08-12 ENCOUNTER — TELEPHONE (OUTPATIENT)
Dept: OTOLARYNGOLOGY | Facility: CLINIC | Age: 50
End: 2020-08-12

## 2020-08-12 ENCOUNTER — PRE VISIT (OUTPATIENT)
Dept: SURGERY | Facility: CLINIC | Age: 50
End: 2020-08-12

## 2020-08-12 NOTE — TELEPHONE ENCOUNTER
Patient called writer to discuss pre-surgical information with patient. Patient states she acknowledges all info. Writer reviewed:    -Having Pre-op physical  -Covid testing  -Stopping basic blood thinners  -Hibiclens usage (Patient states she has this at home)  -What to bring the day of surgery  -Eating and drinking guidelines  -Infection prevention  -Discussing medications with MD at Pre op physical  -stopping any alcohal midnight before surgery.    Patient acknowledged all info and states she would like her pre op appointment changed to different date related to not having transportation. Writer changed pre op appointment to Friday 8/14/2020 at 1600, Patient acknowledged time and date.       Lissa Mccann LPN

## 2020-08-12 NOTE — TELEPHONE ENCOUNTER
Writer called patient for second time to go over surgical education with patient. Writer left name and call back number.      Lissa Mccann LPN

## 2020-08-12 NOTE — TELEPHONE ENCOUNTER
FUTURE VISIT INFORMATION      SURGERY INFORMATION:    Date: 8/26/20    Location: [facility]    Surgeon:  Tess Machado MD     Anesthesia Type:  General    Procedure: Completion Thyroidectomy     Consult: OV 12/23/19    RECORDS REQUESTED FROM:       Primary Care Provider: Jj Miguel MD- [facility]     Pertinent Medical History: systolic ejection murmur

## 2020-08-14 ENCOUNTER — PRE VISIT (OUTPATIENT)
Dept: SURGERY | Facility: CLINIC | Age: 50
End: 2020-08-14

## 2020-08-17 ENCOUNTER — TELEPHONE (OUTPATIENT)
Dept: OTOLARYNGOLOGY | Facility: CLINIC | Age: 50
End: 2020-08-17

## 2020-08-17 NOTE — TELEPHONE ENCOUNTER
FUTURE VISIT INFORMATION        SURGERY INFORMATION:    Date: 8/26/20    Location: [facility]    Surgeon:  Tess Machado MD     Anesthesia Type:  General    Procedure: Completion Thyroidectomy     Consult: OV 12/23/19     RECORDS REQUESTED FROM:         Primary Care Provider: Jj Miguel MD- [facility]     Pertinent Medical History: systolic ejection murmur

## 2020-08-17 NOTE — TELEPHONE ENCOUNTER
Writer called patient in regards to recent pre op appointment. She states she did not go to preop appointment on Friday. She requested for writer to reschedule her (patient getting rescheduled for second time). Writer rescheduled her for 8/18/2020 at 1400. She states she agrees with time and date.    Lissa Mccann LPN

## 2020-08-18 ENCOUNTER — PRE VISIT (OUTPATIENT)
Dept: SURGERY | Facility: CLINIC | Age: 50
End: 2020-08-18

## 2020-08-19 ENCOUNTER — PRE VISIT (OUTPATIENT)
Dept: SURGERY | Facility: CLINIC | Age: 50
End: 2020-08-19

## 2020-08-19 NOTE — TELEPHONE ENCOUNTER
FUTURE VISIT INFORMATION        SURGERY INFORMATION:    Date: 8/26/20    Location: [facility]    Surgeon:  Tess Machado MD     Anesthesia Type:  General    Procedure: Completion Thyroidectomy     Consult: OV 12/23/19     RECORDS REQUESTED FROM:         Primary Care Provider: Jj Miguel MD- [facility]     Pertinent Medical History: systolic ejection murmur

## 2020-08-20 NOTE — TELEPHONE ENCOUNTER
FUTURE VISIT INFORMATION        SURGERY INFORMATION:    Date: 8/26/20    Location: [facility]    Surgeon:  Tess Machado MD     Anesthesia Type:  General    Procedure: Completion Thyroidectomy     Consult: OV 12/23/19     RECORDS REQUESTED FROM:         Primary Care Provider: Jj Miguel MD- [facility]     Pertinent Medical History: systolic ejection murmur

## 2020-08-21 ENCOUNTER — ANESTHESIA EVENT (OUTPATIENT)
Dept: SURGERY | Facility: CLINIC | Age: 50
End: 2020-08-21

## 2020-08-21 ENCOUNTER — OFFICE VISIT (OUTPATIENT)
Dept: SURGERY | Facility: CLINIC | Age: 50
End: 2020-08-21
Payer: COMMERCIAL

## 2020-08-21 ENCOUNTER — PRE VISIT (OUTPATIENT)
Dept: SURGERY | Facility: CLINIC | Age: 50
End: 2020-08-21

## 2020-08-21 VITALS
HEART RATE: 85 BPM | RESPIRATION RATE: 20 BRPM | OXYGEN SATURATION: 100 % | TEMPERATURE: 97.8 F | HEIGHT: 64 IN | WEIGHT: 212 LBS | DIASTOLIC BLOOD PRESSURE: 84 MMHG | SYSTOLIC BLOOD PRESSURE: 124 MMHG | BODY MASS INDEX: 36.19 KG/M2

## 2020-08-21 DIAGNOSIS — C73 FOLLICULAR CARCINOMA OF THYROID (H): Primary | ICD-10-CM

## 2020-08-21 DIAGNOSIS — Z01.818 PREOP EXAMINATION: ICD-10-CM

## 2020-08-21 DIAGNOSIS — C73 FOLLICULAR CARCINOMA OF THYROID (H): ICD-10-CM

## 2020-08-21 LAB
ANION GAP SERPL CALCULATED.3IONS-SCNC: 4 MMOL/L (ref 3–14)
BUN SERPL-MCNC: 12 MG/DL (ref 7–30)
CALCIUM SERPL-MCNC: 8.2 MG/DL (ref 8.5–10.1)
CHLORIDE SERPL-SCNC: 112 MMOL/L (ref 94–109)
CO2 SERPL-SCNC: 26 MMOL/L (ref 20–32)
CREAT SERPL-MCNC: 0.63 MG/DL (ref 0.52–1.04)
ERYTHROCYTE [DISTWIDTH] IN BLOOD BY AUTOMATED COUNT: 17.8 % (ref 10–15)
GFR SERPL CREATININE-BSD FRML MDRD: >90 ML/MIN/{1.73_M2}
GLUCOSE SERPL-MCNC: 98 MG/DL (ref 70–99)
HCT VFR BLD AUTO: 36.8 % (ref 35–47)
HGB BLD-MCNC: 11.2 G/DL (ref 11.7–15.7)
MCH RBC QN AUTO: 24.1 PG (ref 26.5–33)
MCHC RBC AUTO-ENTMCNC: 30.4 G/DL (ref 31.5–36.5)
MCV RBC AUTO: 79 FL (ref 78–100)
NT-PROBNP SERPL-MCNC: 58 PG/ML (ref 0–125)
PLATELET # BLD AUTO: 308 10E9/L (ref 150–450)
POTASSIUM SERPL-SCNC: 3.6 MMOL/L (ref 3.4–5.3)
RBC # BLD AUTO: 4.65 10E12/L (ref 3.8–5.2)
SODIUM SERPL-SCNC: 142 MMOL/L (ref 133–144)
WBC # BLD AUTO: 7.4 10E9/L (ref 4–11)

## 2020-08-21 ASSESSMENT — MIFFLIN-ST. JEOR: SCORE: 1576.63

## 2020-08-21 ASSESSMENT — LIFESTYLE VARIABLES: TOBACCO_USE: 1

## 2020-08-21 ASSESSMENT — PAIN SCALES - GENERAL: PAINLEVEL: NO PAIN (0)

## 2020-08-21 NOTE — H&P
Pre-Operative H & P     CC:  Preoperative exam to assess for increased cardiopulmonary risk while undergoing surgery and anesthesia.    Date of Encounter: 8/21/2020  Primary Care Physician:  Jj Miguel  Reason for visit: Follicular carcinoma of thyroid (H) [C73]    HPI  Barbara Camacho is a 49 year old female who presents for pre-operative H & P in preparation for Completion Thyroidectomy with Dr. Machado on 8/26/20 at [facility]. History is obtained from the patient and medical records.    Patient with history of a right thyroid goiter, s/p right thyroid lobectomy on 10/21/19 by Dr. Nichols. 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 She was then referred to Dr. Machado in consideration for completion thyroidectomy. She was counseled for above procedure.    Her history is otherwise significant for anemia, GERD, and anxiety. She had ovarian cancer at age 26 treated wtih right oophorectomy. She is s/p gastric bypass in 2011. At this time she is taking only calcium supplement.    She reports some mild swallowing difficulty since her last surgery but no significant issues with eating or drinking. She has had a weight gain of 20# during this time of COVID. She denies fever, cough, chest pain, or irregular HR. She reports some recent shortness of breath when she is climbing stairs which she attributes to her weight gain. 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 She is reporting some mild ankle edema.     Past Medical History  Past Medical History:   Diagnosis Date     Anemia      Anxiety      Cancer (H)     ovarian cancer at age 26 - treated with right oophorectomy. no chemo/radiation     Follicular carcinoma of thyroid (H) 10/30/2019     Gastroesophageal reflux disease      Substance abuse (H)      Thyroid nodule 2016    bx done at [facility]        Past  Surgical History  Past Surgical History:   Procedure Laterality Date     APPENDECTOMY OPEN      angeles and ovary removed all at same time as appy     CHOLECYSTECTOMY       FINE NEEDLE ASPIRATION  2019    thyroid     HERNIA REPAIR       LAPAROSCOPIC BYPASS GASTRIC  7/20/2011    Procedure:LAPAROSCOPIC BYPASS GASTRIC; Hiatal hernia repair; Surgeon:FRANKIE VUONG; Location:[facility] OR     OOPHORECTOMY  2000?     THYROIDECTOMY Right 10/21/2019    Procedure: Right Thyroidectomy;  Surgeon: Glenroy Nichols MD;  Location: [facility] OR       Hx of Blood transfusions/reactions: Denies.      Hx of abnormal bleeding or anti-platelet use: Denies.     Menstrual history: Patient's last menstrual period was 06/30/2011.    Steroid use in the last year:  Denies.     Personal or FH with difficulty with Anesthesia:  Denies.     Prior to Admission Medications  Current Outpatient Medications   Medication Sig Dispense Refill     calcium carbonate (OS-KAVITA) 500 MG tablet Take 1 tablet (500 mg) by mouth 2 times daily (Patient taking differently: Take 1 tablet by mouth every morning ) 180 tablet 3       Allergies  Allergies   Allergen Reactions     Codeine Itching     Tylenol W/Codeine [Acetaminophen-Codeine] Hives       Social History  Social History     Socioeconomic History     Marital status:      Spouse name: Not on file     Number of children: Not on file     Years of education: Not on file     Highest education level: Not on file   Occupational History     Not on file   Social Needs     Financial resource strain: Not on file     Food insecurity     Worry: Not on file     Inability: Not on file     Transportation needs     Medical: Not on file     Non-medical: Not on file   Tobacco Use     Smoking status: Current Every Day Smoker     Packs/day: 0.50     Years: 25.00     Pack years: 12.50     Start date: 1986     Smokeless tobacco: Never Used   Substance and Sexual Activity     Alcohol use: Not Currently     Drug use: Not Currently      Comment: 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 In outpatient treatment.      Sexual activity: Not Currently     Partners: Male     Birth control/protection: None   Lifestyle     Physical activity     Days per week: Not on file     Minutes per session: Not on file     Stress: Not on file   Relationships     Social connections     Talks on phone: Not on file     Gets together: Not on file     Attends Zoroastrian service: Not on file     Active member of club or organization: Not on file     Attends meetings of clubs or organizations: Not on file     Relationship status: Not on file     Intimate partner violence     Fear of current or ex partner: Not on file     Emotionally abused: Not on file     Physically abused: Not on file     Forced sexual activity: Not on file   Other Topics Concern     Parent/sibling w/ CABG, MI or angioplasty before 65F 55M? Not Asked   Social History Narrative     Not on file       Family History  Family History   Problem Relation Age of Onset     Coronary Artery Disease Maternal Grandmother      Hypertension Maternal Grandmother      Unknown/Adopted Maternal Grandmother      Depression Maternal Grandmother      Heart Disease Maternal Grandmother      Stomach Problem Maternal Grandmother      Diabetes Paternal Grandmother      Dementia Paternal Grandmother      Asthma Paternal Grandmother      Rheumatoid Arthritis Mother      Unknown/Adopted Mother      Depression Mother      Myocardial Infarction Father      Hypertension Father      Asthma Father      Depression Father      Heart Disease Father      Back Pain Maternal Grandfather      Substance Abuse Maternal Grandfather      Dementia Maternal Grandfather      Unknown/Adopted Maternal Grandfather      Rheumatoid Arthritis Maternal Aunt      Stomach Cancer Maternal Aunt      No Known Problems Paternal Grandfather      Myocardial Infarction Paternal Aunt      Cerebrovascular Disease Paternal Aunt      Thyroid Disease Other  "     Migraines Other      Cancer No family hx of      Preop Vitals    BP Readings from Last 3 Encounters:   12/23/19 115/77   10/21/19 124/80   08/13/19 133/85    Pulse Readings from Last 3 Encounters:   12/23/19 84   10/21/19 70   08/13/19 78      Resp Readings from Last 3 Encounters:   12/23/19 18   10/21/19 16   08/29/19 16    SpO2 Readings from Last 3 Encounters:   10/21/19 97%   08/13/19 98%   05/17/19 99%      Temp Readings from Last 1 Encounters:   10/21/19 98.4  F (36.9  C) (Oral)    Ht Readings from Last 1 Encounters:   12/23/19 1.6 m (5' 3\")      Wt Readings from Last 1 Encounters:   12/23/19 87.5 kg (193 lb)    Estimated body mass index is 34.19 kg/m  as calculated from the following:    Height as of 12/23/19: 1.6 m (5' 3\").    Weight as of 12/23/19: 87.5 kg (193 lb).     ROS/MED HX    The complete review of systems is negative other than noted in the HPI or here.     NT/Pulmonary:     (+)GABBY risk factors snores loudly, tobacco use, Past use , . .    Neurologic:     (+)migraines,     Cardiovascular:     (+) ----. : . . . :. valvular problems/murmurs Systolic  ejection murmur:. Previous cardiac testing date:results:date: results:ECG reviewed date:6/4/20 results:NSR date: results:          METS/Exercise Tolerance:  >4 METS   Hematologic:     (+) Anemia, -     (-) History of Transfusion   Musculoskeletal:  - neg musculoskeletal ROS       GI/Hepatic:     (+) GERD Asymptomatic on medication,       Renal/Genitourinary:  - ROS Renal section negative       Endo:     (+)  Thyroid disease - Other, .      Psychiatric:     (+) psychiatric history anxiety      Infectious Disease:  - neg infectious disease ROS       Malignancy:   (+) Malignancy History of Other  Other CA H/o ovarian cancer s/p right oopherectomy;  serial C-125 negative/WNL Remission status post Surgery Follicular carcinoma of thyroid s/p surgery        Other:    (+) C-spine cleared: N/A, no H/O Chronic Pain,no other significant disability              " "PHYSICAL EXAM:   Mental Status/Neuro: A/A/O; Age Appropriate   Airway: Facies: Feasible  Mallampati: I  Mouth/Opening: Full  TM distance: > 6 cm  Neck ROM: Full   Respiratory: Auscultation: CTAB     Resp. Rate: Normal     Resp. Effort: Normal      CV: Rhythm: Regular   Comments:    Temp: 97.8  F (36.6  C) Temp src: Oral BP: 124/84 Pulse: 85   Resp: 20 SpO2: 100 %         212 lbs 0 oz  5' 4\"   Body mass index is 36.39 kg/m .       Physical Exam  Constitutional: Awake, alert, cooperative, no apparent distress, and appears stated age.  Eyes: Pupils equal, round and reactive to light, extra ocular muscles intact, sclera clear, conjunctiva normal.  HENT: Normocephalic, oral pharynx with moist mucus membranes, poor dentition. Cracked tooth lower right side site of pain. Well healed right neck incision.   Respiratory: Clear to auscultation bilaterally, no crackles or wheezing. No cough or obvious dyspnea.  Cardiovascular: Regular rate and rhythm, normal S1 and S2, and no murmur noted.  Carotids +2, no bruits. 1+ nonpitting bilateral lower extremity edema left greater than right, with mild red skin discoloration. Palpable pulses to radial  DP and PT arteries.   GI: Normal bowel sounds, soft, non-distended, non-tender, no masses palpated, no hepatosplenomegaly.   Lymph/Hematologic: No cervical lymphadenopathy and no supraclavicular lymphadenopathy.  Genitourinary: Deferred.   Skin: Warm and dry.  No rashes at anticipated surgical site.   Musculoskeletal: Full ROM of neck. There is no redness, warmth, or swelling of the joints. Gross motor strength is normal.    Neurologic: Awake, alert, oriented to name, place and time. Cranial nerves II-XII are grossly intact. Gait is normal.   Neuropsychiatric: Calm, cooperative. Normal affect.     Labs: personally reviewed  Lab Results   Component Value Date    WBC 7.4 08/21/2020     Lab Results   Component Value Date    RBC 4.65 08/21/2020     Lab Results   Component Value Date    HGB " 11.2 08/21/2020     Lab Results   Component Value Date    HCT 36.8 08/21/2020     Lab Results   Component Value Date    MCV 79 08/21/2020     Lab Results   Component Value Date    MCH 24.1 08/21/2020     Lab Results   Component Value Date    MCHC 30.4 08/21/2020     Lab Results   Component Value Date    RDW 17.8 08/21/2020     Lab Results   Component Value Date     08/21/2020     Last Comprehensive Metabolic Panel:  Sodium   Date Value Ref Range Status   08/21/2020 142 133 - 144 mmol/L Final     Potassium   Date Value Ref Range Status   08/21/2020 3.6 3.4 - 5.3 mmol/L Final     Chloride   Date Value Ref Range Status   08/21/2020 112 (H) 94 - 109 mmol/L Final     Carbon Dioxide   Date Value Ref Range Status   08/21/2020 26 20 - 32 mmol/L Final     Anion Gap   Date Value Ref Range Status   08/21/2020 4 3 - 14 mmol/L Final     Glucose   Date Value Ref Range Status   08/21/2020 98 70 - 99 mg/dL Final     Urea Nitrogen   Date Value Ref Range Status   08/21/2020 12 7 - 30 mg/dL Final     Creatinine   Date Value Ref Range Status   08/21/2020 0.63 0.52 - 1.04 mg/dL Final     GFR Estimate   Date Value Ref Range Status   08/21/2020 >90 >60 mL/min/[1.73_m2] Final     Comment:     Non  GFR Calc  Starting 12/18/2018, serum creatinine based estimated GFR (eGFR) will be   calculated using the Chronic Kidney Disease Epidemiology Collaboration   (CKD-EPI) equation.       Calcium   Date Value Ref Range Status   08/21/2020 8.2 (L) 8.5 - 10.1 mg/dL Final   BNP 58    EKG: Personally reviewed 6/4/20 Normal sinus rhythm    Thyroid US 7/17/19  Impression:    Multinodular thyroid with large nodule in the right lobe measuring 8.5  cm. This meets TI-RADS 4 criteria, laterally suspicious. This has been previously sampled and returned suspicious for follicular neoplasm. No dedicated thyroid imaging other than procedural FNA to compare size.  Small nodule in the left lobe most consistent with a colloid nodule.    Imaging  reviewed by this provider    Outside records reviewed from: Care Everywhere    ASSESSMENT and PLAN  Barbara Camacho is a 49 year old female scheduled to undergo Completion Thyroidectomy with Dr. Machado on 8/26/20. She has the following specific operative considerations:   - RCRI : No serious cardiac risks.    - Anesthesia considerations:  Refer to PAC assessment in anesthesia records  - VTE risk: 1.8%  - GABBY # of risks 1/8 = Low risk  - Risk of PONV score = 2.     --Follicular carcinoma of thyroid with above procedure now planned.    --Mild swallowing difficulty after last thyroid surgery.     --History of gastric bypass surgery in 2011.    --Anemia with history of iron supplementation. None now. Hgb today 11.2.    --No cardiac history or meds. 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 20# weight gain over the last few months. Mild bilateral lower extremity edema. EKG above NSR. Will draw BNP today for screening.     --Current smoking 1/2 PPD. 12.5 pack years. Tried Nicorette gum for a time but it didn't work for her.  Denies cough.     --Current toothache at site of cracked tooth right lower side. Is attempting to see her dentist today.     --History of ovarian cancer at age 26, s/p right oophorectomy.     Arrival time, NPO, shower and medication instructions provided by nursing staff today.    Patient discussed with Dr. Saleem.    ADDENDUM: BNP 58. No further testing required.    DWIGHT Farmer CNS  Preoperative Assessment Center  [facility]  Clinic and Surgery Center  Phone: [phone]  Fax: [phone]

## 2020-08-21 NOTE — PATIENT INSTRUCTIONS
Preparing for Your Surgery      Name:  Barbara Camacho   MRN:  [id number]   :  1970   Today's Date:  2020       Arriving for surgery:  Surgery date:  20  Arrival time:  09:20 am    Restrictions due to COVID 19:  Patients are allowed one visitor in the pre-op period  All visitors must wear a mask  No visitors under 18  No ill visitors   parking is not available     Please come to:   [facility]   [address] [place]  [address]  -    Please proceed to the Surgery Lounge on the 3rd floor. [phone]?     - ?If you are in need of directions, wheelchair or escort please stop at the Information Desk in the lobby.  Inform the information person that you are here for surgery; a wheelchair and escort will be provided to the Surgery Lounge .?     What can I eat or drink?  -  You may eat and drink normally for up to 8 hours before your surgery.   -  You may have clear liquids until 2 hours before surgery.   Examples of clear liquids:  Water  Clear broth  Juices (apple, white grape, white cranberry  and cider) without pulp  Noncarbonated, powder based beverages  (lemonade and Antoine-Aid)  Sodas (Sprite, 7-Up, ginger ale and seltzer)  Coffee or tea (without milk or cream)  Gatorade    -  No Alcohol for at least 24 hours before surgery     Which medicines can I take?    Hold Aspirin for 7 days before surgery.   Hold Multivitamins for 7 days before surgery.  Hold Supplements for 7 days before surgery.  Hold Ibuprofen (Advil, Motrin) for 1 day before surgery--unless otherwise directed by surgeon.  Hold Naproxen (Aleve) for 4 days before surgery.  -  PLEASE TAKE these medications the day of surgery:  Tylenol if needed.    How do I prepare myself?  - Please take 2 showers before surgery using Scrubcare or Hibiclens soap.    Use this soap only from the neck to your toes.     Leave the soap on your skin for one minute--then rinse thoroughly.      You may use your  own shampoo and conditioner; no other hair products.   - Please remove all jewelry and body piercings.  - No lotions, deodorants or fragrance.  - No makeup or fingernail polish.   - Bring your ID and insurance card.    - All patients are required to have a Covid-19 test within 4 days of surgery/procedure.      -Patients will be contacted by the [facility] scheduling team within 1 week of surgery to make an appointment.      - Patients may call the Scheduling team at [phone] if they have not been scheduled within 4 days of  surgery.      ALL PATIENTS GOING HOME THE SAME DAY OF SURGERY ARE REQUIRED TO HAVE A RESPONSIBLE ADULT TO DRIVE AND BE IN ATTENDANCE WITH THEM FOR 24 HOURS FOLLOWING SURGERY     Questions or Concerns:    - For any questions regarding the day of surgery or your hospital stay, please contact the Pre Admission Nursing Office at [phone].       - If you have health changes between today and your surgery please call your surgeon.       For questions after surgery please call your surgeons office.

## 2020-08-21 NOTE — ANESTHESIA PREPROCEDURE EVALUATION
"Anesthesia Pre-Procedure Evaluation    Patient: Barbara Camacho   MRN:     [id number] Gender:   female   Age:    49 year old :      1970        Preoperative Diagnosis: * No surgery found *        LABS:  CBC:   Lab Results   Component Value Date    WBC 5.6 2019    WBC 11.0 2011    HGB 10.5 (L) 2019    HGB 12.9 2011    HCT 35.7 2019    HCT 36.9 2011     2019     2011     BMP:   Lab Results   Component Value Date     2019     2011    POTASSIUM 4.1 2019    POTASSIUM 3.5 2011    CHLORIDE 108 2019    CHLORIDE 104 2011    CO2 28 2019    CO2 26 2011    BUN 9 2019    BUN 5 2011    CR 0.53 2019    CR 0.50 (L) 2011    GLC 79 2019    GLC 69 2011     COAGS: No results found for: PTT, INR, FIBR  POC:   Lab Results   Component Value Date     (H) 10/21/2019    HCGS Negative 2011     OTHER:   Lab Results   Component Value Date    A1C 5.4 2019    KAVITA 8.6 2019    PHOS 2.8 2011    MAG 2.0 2011    ALBUMIN 3.9 2019    PROTTOTAL 7.0 2019    ALT 32 2019    AST 29 2019    ALKPHOS 75 2019    BILITOTAL 0.5 2019    TSH 1.26 2019        Preop Vitals    BP Readings from Last 3 Encounters:   19 115/77   10/21/19 124/80   19 133/85    Pulse Readings from Last 3 Encounters:   19 84   10/21/19 70   19 78      Resp Readings from Last 3 Encounters:   19 18   10/21/19 16   19 16    SpO2 Readings from Last 3 Encounters:   10/21/19 97%   19 98%   19 99%      Temp Readings from Last 1 Encounters:   10/21/19 98.4  F (36.9  C) (Oral)    Ht Readings from Last 1 Encounters:   19 1.6 m (5' 3\")      Wt Readings from Last 1 Encounters:   19 87.5 kg (193 lb)    Estimated body mass index is 34.19 kg/m  as calculated from the following:    Height as of 19: 1.6 m " "(5' 3\").    Weight as of 12/23/19: 87.5 kg (193 lb).     LDA:  Closed/Suction Drain 1 Right Neck Bulb  (Active)   Number of days: 305        Past Medical History:   Diagnosis Date     Anemia      Anxiety      Cancer (H)     ovarian cancer at age 26 - treated with right oophorectomy. no chemo/radiation     Follicular carcinoma of thyroid (H) 10/30/2019     Gastroesophageal reflux disease      Substance abuse (H)      Thyroid nodule 2016    bx done at [facility]       Past Surgical History:   Procedure Laterality Date     APPENDECTOMY OPEN      angeles and ovary removed all at same time as appy     CHOLECYSTECTOMY       FINE NEEDLE ASPIRATION  2019    thyroid     HERNIA REPAIR       LAPAROSCOPIC BYPASS GASTRIC  7/20/2011    Procedure:LAPAROSCOPIC BYPASS GASTRIC; Hiatal hernia repair; Surgeon:FRANKIE VUONG; Location:[facility] OR     OOPHORECTOMY  2000?     THYROIDECTOMY Right 10/21/2019    Procedure: Right Thyroidectomy;  Surgeon: Glenroy Nichols MD;  Location: [facility] OR      Allergies   Allergen Reactions     Codeine Itching     Tylenol W/Codeine [Acetaminophen-Codeine] Hives        Anesthesia Evaluation     . Pt has had prior anesthetic. Type: General (07/20/11; 0814; Airway Size: 7;  Cuffed;  Oral endotracheal tube;  Blade Type: Mino;  Blade Size: 3;  Insertion Attempts: 1;  Secured at (cm)to lip: 22 cm;  Breath Sounds: Equal, clear and bilateral;  End Tidal CO2: Present;  Dentition: Intact;  Grade)    No history of anesthetic complications          ROS/MED HX    ENT/Pulmonary:     (+)GABBY risk factors snores loudly, tobacco use, Current use 1/2 packs/day  , . .    Neurologic:     (+)migraines,     Cardiovascular:  - neg cardiovascular ROS   (+) ----. : . . . :. . Previous cardiac testing date:results:date: results:ECG reviewed date:6/4/20 results:NSR date: results:         (-) taking anticoagulants/antiplatelets   METS/Exercise Tolerance:  >4 METS   Hematologic:     (+) Anemia, -     (-) History of Transfusion "   Musculoskeletal:  - neg musculoskeletal ROS       GI/Hepatic:  - neg GI/hepatic ROS       Renal/Genitourinary:  - ROS Renal section negative       Endo:     (+)  Thyroid disease - Other, Obesity, .      Psychiatric:     (+) psychiatric history anxiety      Infectious Disease:  - neg infectious disease ROS       Malignancy:   (+) Malignancy History of Other  Other CA H/o ovarian cancer s/p right oopherectomy;  serial C-125 negative/WNL Remission status post Surgery Follicular carcinoma of thyroid s/p surgery        Other:    (+) C-spine cleared: N/A, no H/O Chronic Pain,no other significant disability                        PHYSICAL EXAM:   Mental Status/Neuro: A/A/O; Age Appropriate   Airway: Facies: Feasible  Mallampati: I  Mouth/Opening: Full  TM distance: > 6 cm  Neck ROM: Full   Respiratory: Auscultation: CTAB     Resp. Rate: Normal     Resp. Effort: Normal      CV: Rhythm: Regular  Heart: Normal Sounds  Edema: LLE; RLE   Comments: Poor dentition. Some missing teeth. Toothache today is trying to see dentist.      Dental: Details                JZG FV AN PLAN NO PONV RULE       PAC Discussion and Assessment    ASA Classification: 3  Case is suitable for: [place]  Anesthetic techniques and relevant risks discussed: GA  Invasive monitoring and risk discussed: No  Types:   Possibility and Risk of blood transfusion discussed: No  NPO instructions given:   Additional anesthetic preparation and risks discussed:   Needs early admission to pre-op area:   Other:     PAC Resident/NP Anesthesia Assessment:  Barbara Camacho is a 49 year old female scheduled to undergo Completion Thyroidectomy with Dr. Machado on 8/26/20. She has the following specific operative considerations:   - RCRI : No serious cardiac risks.    - VTE risk: 1.8%  - GABBY # of risks 1/8 = Low risk  - Risk of PONV score = 2.     --Follicular carcinoma of thyroid with above procedure now planned.    --Mild swallowing difficulty after last thyroid surgery.      --History of gastric bypass surgery in 2011.    --Anemia with history of iron supplementation. None now. Hgb today 11.2.    --No cardiac history or meds. 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 20# weight gain over the last few months. Mild bilateral lower extremity edema. EKG above NSR. Will draw BNP today for screening.     --Current smoking 1/2 PPD. 12.5 pack years. Tried Nicorette gum for a time but it didn't work for her.  Denies cough.    --Current toothache at site of cracked tooth right lower side. Is attempting to see her dentist today.     --History of ovarian cancer at age 26, s/p right oophorectomy.       Patient discussed with Dr. Saleem.    ADDENDUM: BNP 58. No further testing required.      Reviewed and Signed by PAC Mid-Level Provider/Resident  Mid-Level Provider/Resident: DWIGHT Collado, GRACIE  Date: 8/21/20  Time: 9:13am    Attending Anesthesiologist Anesthesia Assessment:  STAFF:  49 y.o. woman with follicular CA of thyroid for completion thyroidectomy by Dr. SCHUSTER using general anesthesia.   History summarized above.  Recovered from COVID, but has gained 20 #, with some minor SOB / PARSONS. BMI = 36 now.  WIll check BNP  Instructions given and questions answered.   Final plans by anesthesiology team on DOS.   ---rcp      Reviewed and Signed by PAC Anesthesiologist  Anesthesiologist: rcp  Date: 8/21/2020  Time:   Pass/Fail:   Disposition:     PAC Pharmacist Assessment:        Pharmacist:   Date:   Time:    DWIGHT Farmer CNS

## 2020-08-25 ENCOUNTER — TELEPHONE (OUTPATIENT)
Dept: OTOLARYNGOLOGY | Facility: CLINIC | Age: 50
End: 2020-08-25

## 2020-08-25 NOTE — TELEPHONE ENCOUNTER
Writer called patient to get her scheduled for Rapid Covid test. Surgical scheduling state they do not have a Covid test on her. Writer called to schedule her for this in order to proceed with surgery.  Writer provided patient with name and call back number.     Awaiting call back.     Lissa Mccann LPN

## 2020-08-25 NOTE — TELEPHONE ENCOUNTER
Talked with patient regarding not getting a covid-19 test done prior to surgery. Patient stated that she was told that her covid19 test would be done at her pre-op. Patient doesn't know what a covid19 test entails, so she didn't know if she had one done. Patient was seen in lab the day of her pre-op PAC appt, but she did not have covid-19 test done as she was outside of the 4 day window of surgery.   Hospital staff cancelled surgery. Patient was tentatively rescheduled for 9/16 waiting to confirm operating room time.   Will call patient back regarding confirming surgery date early next week and setting up covid19 test.   No further questions or concerns right now.      Nicolette Cardoso  08/25/20 4:47 PM

## 2020-09-02 ENCOUNTER — PREP FOR PROCEDURE (OUTPATIENT)
Dept: OTOLARYNGOLOGY | Facility: CLINIC | Age: 50
End: 2020-09-02

## 2020-09-02 DIAGNOSIS — C73 FOLLICULAR CARCINOMA (H): Primary | ICD-10-CM

## 2020-09-04 ENCOUNTER — HOSPITAL ENCOUNTER (OUTPATIENT)
Facility: CLINIC | Age: 50
End: 2020-09-04
Attending: SURGERY | Admitting: SURGERY
Payer: COMMERCIAL

## 2020-09-04 DIAGNOSIS — C73 FOLLICULAR CARCINOMA (H): ICD-10-CM

## 2020-09-04 NOTE — TELEPHONE ENCOUNTER
Talked with patient regarding plan for surgery on Sept 16th, 2020.   [place] [facility] [place].   No further questions or concerns.     Nicolette Cardoso  09/04/20  11:10 AM

## 2020-09-13 RX ORDER — CEFAZOLIN SODIUM 1 G/3ML
1 INJECTION, POWDER, FOR SOLUTION INTRAMUSCULAR; INTRAVENOUS SEE ADMIN INSTRUCTIONS
Status: CANCELLED | OUTPATIENT
Start: 2020-09-13

## 2020-09-13 RX ORDER — CEFAZOLIN SODIUM 2 G/100ML
2 INJECTION, SOLUTION INTRAVENOUS
Status: CANCELLED | OUTPATIENT
Start: 2020-09-13

## 2020-09-15 ENCOUNTER — TELEPHONE (OUTPATIENT)
Dept: OTOLARYNGOLOGY | Facility: CLINIC | Age: 50
End: 2020-09-15

## 2020-09-15 NOTE — TELEPHONE ENCOUNTER
Attempted to reach patient regarding rescheduling with Dr. Machado after pre-op RN Moni Daley, talked to patients mother.               Good morning,     Just spoke with patient's mother Keysha (unable to reach patient to see if she had COVID test done for procedure tomorrow). Keysha stated that patient is not feeling well and has left a few messages needing to reschedule. Informed Keysha to notify patient that we would have someone from the clinic call to help reschedule. She stated that she would notify Barbara to be by her phone to answer it.        No answer, vm box is full. SMS sent with direct number to call back.       Nicolette Cardoso  09/15/20 10:04 AM

## 2020-09-15 NOTE — TELEPHONE ENCOUNTER
Attempted to contact patient regarding her No Showing to her covid19 test yesterday. Patients vm box is full.   Unable to leave a message. Will attempt to reach patient again today.     Nicolette Cardoso  09/15/20 9:01 AM

## 2020-09-22 ENCOUNTER — TELEPHONE (OUTPATIENT)
Dept: ENDOCRINOLOGY | Facility: CLINIC | Age: 50
End: 2020-09-22

## 2020-09-22 ENCOUNTER — DOCUMENTATION ONLY (OUTPATIENT)
Dept: OTOLARYNGOLOGY | Facility: CLINIC | Age: 50
End: 2020-09-22

## 2020-09-22 ENCOUNTER — TELEPHONE (OUTPATIENT)
Dept: OTOLARYNGOLOGY | Facility: CLINIC | Age: 50
End: 2020-09-22

## 2020-09-22 NOTE — PROGRESS NOTES
Writer spoke to Dr. Del Real regarding this patient and moving forward with surgery after multiple rescheduled dates. Per Dr. Machado to have her referred back to endocrinology. She states initial work up is needed because too much time has passed. Writer called patients endocrinology team (Dr. Stephens) in [place].     Writer spoke to triage nurse name- lovely. Writer stated that patient needs to be evaluated with endocrinology prior to surgery at this time (surgery: completion of thyroidectomy related to follicular carcinoma). Writer also stated that this surgery has been rescheduled several times. This is related to patient missing pre op, covid testing and rescheduling again. Writer provided lovely with name and direct line for any other questions that may arise for this case.     Lissa Mccann LPN

## 2020-09-22 NOTE — TELEPHONE ENCOUNTER
I have never seen this pt.  Looks like she was seen at  and then by .  Please ask pt to follow up with previous providers if possible.

## 2020-09-22 NOTE — TELEPHONE ENCOUNTER
Writer spoke to eddi in regards to follow up plan for several missed surgeries. Writer stated to patient that Dr. Machado would need her to be reassessed with endocrinology before planning surgery again. Writer stated that too much time has passed and she needs to be re assessed. Patient acknowledged this and is aware. Writer advised patient to call her endocrinology MD at [phone]. Writer also stated that she has no showed her initial visit with them, and needs to make sure she attends this visit in order to move forward with surgery. Patient acknowledged and she stated she would call them to schedule.      Lissa Mccann LPN

## 2020-09-22 NOTE — TELEPHONE ENCOUNTER
Lissa calling from Dr Machado ENT office calling to say this patient's thyroidectomy has been rescheduled multiple times throughout this year. Dr Machado would like Barbara to consult with Dr Stephens again since it has been so long because there could be changes in her health that may have taken place. ENT suggests maybe some updated lab work and or imaging to see if things have changed  before they actually get surgery scheduled again.  Questions call Lissa phone [phone].  Otherwise please contact patient for appointments.

## 2020-09-22 NOTE — TELEPHONE ENCOUNTER
LM for Lissa with info below.    Karina Daley CMA  [facility]  Elizabeth/[place]ry

## 2020-10-02 ENCOUNTER — TELEPHONE (OUTPATIENT)
Dept: OTOLARYNGOLOGY | Facility: CLINIC | Age: 50
End: 2020-10-02

## 2020-10-02 NOTE — TELEPHONE ENCOUNTER
January at the call center left a vm on surgery scheduling line on 10/1 at 450pm stating that patient would like to schedule her surgery. Phone call was not returned as patient should follow up with recommendations from Dr. Machado.    See previous encounter from 9/22 by DRAGAN Alcaraz.

## 2020-10-09 ENCOUNTER — TELEPHONE (OUTPATIENT)
Dept: OTOLARYNGOLOGY | Facility: CLINIC | Age: 50
End: 2020-10-09

## 2020-10-09 NOTE — TELEPHONE ENCOUNTER
Writer called patients mom in regards to recent my chart message. Writer provided her with patients endocrinologist number. Writer stated that patient would need to follow up with them and be reassessed for surgery since so much time has passed. She acknowledged. Writer also provided patient with patients primary number. She acknowledges this and is agreeable.      Lissa Mccann LPN

## 2020-10-19 ENCOUNTER — TRANSFERRED RECORDS (OUTPATIENT)
Dept: HEALTH INFORMATION MANAGEMENT | Facility: CLINIC | Age: 50
End: 2020-10-19

## 2020-10-26 ENCOUNTER — TRANSFERRED RECORDS (OUTPATIENT)
Dept: HEALTH INFORMATION MANAGEMENT | Facility: CLINIC | Age: 50
End: 2020-10-26

## 2020-10-28 ENCOUNTER — TELEPHONE (OUTPATIENT)
Dept: OTOLARYNGOLOGY | Facility: CLINIC | Age: 50
End: 2020-10-28

## 2020-10-29 ENCOUNTER — TELEPHONE (OUTPATIENT)
Dept: OTOLARYNGOLOGY | Facility: CLINIC | Age: 50
End: 2020-10-29

## 2020-10-29 NOTE — TELEPHONE ENCOUNTER
Writer called patients mom Homa in regards to message from surgical scheduling Nicolette. Homa states that Barbara has gotten an ultrasound of thyroid and has been to endocrinology appointment.    Writer stated that we would need to retrieve these results from medical records. After, Dr. Machado would need to review and consult with patient in person. After this has been completed then Dr. Machado would move forward. Homa acknowledged this. Writer stated that we would give her a call in the next couple of days.     Homa was agreeable to this.       Lissa Mccann LPN

## 2020-10-29 NOTE — TELEPHONE ENCOUNTER
Records Requested  10/29/20    Facility  [facility] recs fax.  [phone]  Imaging fx. [phone]   Outcome Sent a fax for :  ultrasound of thyroid 10/26/2020 (report and images)  endocrinology appointment 10/19/2020     10/29/2020 1:36PM received recs and sent to scan, images in PACS. Notified LPN - Amay

## 2020-11-02 ENCOUNTER — OFFICE VISIT (OUTPATIENT)
Dept: FAMILY MEDICINE | Facility: CLINIC | Age: 50
End: 2020-11-02
Payer: COMMERCIAL

## 2020-11-02 VITALS
BODY MASS INDEX: 37.73 KG/M2 | RESPIRATION RATE: 20 BRPM | WEIGHT: 219.8 LBS | SYSTOLIC BLOOD PRESSURE: 145 MMHG | DIASTOLIC BLOOD PRESSURE: 87 MMHG | OXYGEN SATURATION: 100 % | TEMPERATURE: 97.7 F | HEART RATE: 81 BPM

## 2020-11-02 DIAGNOSIS — Z01.818 PREOP GENERAL PHYSICAL EXAM: Primary | ICD-10-CM

## 2020-11-02 PROCEDURE — 99202 OFFICE O/P NEW SF 15 MIN: CPT | Mod: GC | Performed by: STUDENT IN AN ORGANIZED HEALTH CARE EDUCATION/TRAINING PROGRAM

## 2020-11-02 RX ORDER — DOCUSATE SODIUM 100 MG/1
CAPSULE, LIQUID FILLED ORAL
COMMUNITY
Start: 2020-10-23 | End: 2024-07-08

## 2020-11-02 RX ORDER — FERROUS SULFATE 325(65) MG
TABLET ORAL
COMMUNITY
Start: 2020-10-23 | End: 2024-07-08

## 2020-11-02 RX ORDER — MULTIVITAMIN WITH FOLIC ACID 400 MCG
1 TABLET ORAL DAILY
COMMUNITY
Start: 2020-10-23

## 2020-11-02 RX ORDER — HYDROXYZINE HYDROCHLORIDE 50 MG/1
TABLET, FILM COATED ORAL
COMMUNITY
Start: 2020-10-23 | End: 2024-07-08

## 2020-11-02 NOTE — PROGRESS NOTES
[facility] [address] [place] [address]  Phone: [phone]  Fax: [phone]  Primary Provider: Jj Miguel      PREOPERATIVE EVALUATION:  Today's date: 11/2/2020    Barbara Camacho is a 49 year old female who presents for a preoperative evaluation.    Surgical Information:  Surgery/Procedure: Thyroidectomy - left side  [place] [facility] [place] Jeannette  Surgery Date: No scheduled  Time of Surgery: Unknown  Where patient plans to recover: Other: back to [facility]  Fax number for surgical facility: Note does not need to be faxed, will be available electronically in Epic.    Type of Anesthesia Anticipated: General    Subjective     HPI related to upcoming procedure: Patient has history of follicular carcinoma in right thyroid s/p right thyroid. 10/21/19 by Dr. Nichols. 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 She was then referred to Dr. Machado in consideration for completion thyroidectomy. However she has not currently been scheduled for her procedure as she has cancelled her previous surgery appointments several times.    Her history is otherwise significant for anemia, GERD, and anxiety. She had ovarian cancer at age 26 treated wtih right oophorectomy. She is s/p gastric bypass in 2011.    No flowsheet data found.    Health Care Directive:  Patient does not have a Health Care Directive or Living Will: Discussed advance care planning with patient; however, patient declined at this time.    Preoperative Review of :  Not yet reviewed.    Review of Systems  CONSTITUTIONAL: NEGATIVE for fever, chills, change in weight  INTEGUMENTARY/SKIN: NEGATIVE for worrisome rashes, moles or lesions  EYES: NEGATIVE for vision changes or irritation  ENT/MOUTH: NEGATIVE for ear, mouth and throat problems  RESP: NEGATIVE for significant cough or SOB  BREAST: NEGATIVE for masses, tenderness or  discharge  CV: NEGATIVE for chest pain, palpitations or peripheral edema  GI: NEGATIVE for nausea, abdominal pain, heartburn, or change in bowel habits  : NEGATIVE for frequency, dysuria, or hematuria  MUSCULOSKELETAL: NEGATIVE for significant arthralgias or myalgia  NEURO: NEGATIVE for weakness, dizziness or paresthesias  ENDOCRINE: NEGATIVE for temperature intolerance, skin/hair changes  HEME: NEGATIVE for bleeding problems  PSYCHIATRIC: NEGATIVE for changes in mood or affect    Patient Active Problem List    Diagnosis Date Noted     Follicular carcinoma (H) 09/04/2020     Priority: Medium     Added automatically from request for surgery [id number]       Follicular carcinoma of thyroid (H) 10/30/2019     Priority: Medium     Added automatically from request for surgery [id number]       Systolic ejection murmur 01/06/2017     Priority: Medium     2/6 systolic ejection murmur, no thrill. Normotensive. 1/6/17       Thyroid nodule 10/06/2016     Priority: Medium     Surgery pending - endocrinology at .        History of ovarian cancer 10/06/2016     Priority: Medium     When 26 - had oophorectomy and no longer follows up with oncology.         Anxiety 10/06/2016     Priority: Medium     Multinodular goiter 09/23/2015     Priority: Medium     Cellulitis and abscess 03/14/2012     Priority: Medium     Migraine 07/20/2010     Priority: Medium     Ovarian cancer (H) 07/20/2010     Priority: Medium      Past Medical History:   Diagnosis Date     Anemia      Anxiety      Cancer (H)     ovarian cancer at age 26 - treated with right oophorectomy. no chemo/radiation     Follicular carcinoma of thyroid (H) 10/30/2019     Gastroesophageal reflux disease      Substance abuse (H)      Thyroid nodule 2016    bx done at [facility]      Past Surgical History:   Procedure Laterality Date     APPENDECTOMY OPEN      angeles and ovary removed all at same time as appy     CHOLECYSTECTOMY       FINE NEEDLE ASPIRATION  2019    thyroid      HERNIA REPAIR       LAPAROSCOPIC BYPASS GASTRIC  7/20/2011    Procedure:LAPAROSCOPIC BYPASS GASTRIC; Hiatal hernia repair; Surgeon:FRANKIE VUONG; Location:[facility] OR     OOPHORECTOMY  2000?     THYROIDECTOMY Right 10/21/2019    Procedure: Right Thyroidectomy;  Surgeon: Glenroy Nichols MD;  Location: [facility] OR     Current Outpatient Medications   Medication Sig Dispense Refill     calcium carbonate (OS-KAVITA) 500 MG tablet Take 1 tablet (500 mg) by mouth 2 times daily (Patient taking differently: Take 1 tablet by mouth every morning ) 180 tablet 3     docusate sodium (COLACE) 100 MG capsule TAKE 1 CAPSULE BY MOUTH TWICE A DAY       ferrous sulfate (FEROSUL) 325 (65 Fe) MG tablet TAKE 1 TABLET BY MOUTH EVERY DAY WITH BREAKFAST       hydrOXYzine (ATARAX) 50 MG tablet TAKE 2 TABLETS BY MOUTH AT BEDTIME AS NEEDED FOR OTHER (INSOMNIA).       Multiple Vitamins-Minerals (TAB-A-DAISY) TABS Take 1 tablet by mouth daily         Allergies   Allergen Reactions     Codeine Itching     Tylenol W/Codeine [Acetaminophen-Codeine] Hives        Social History     Tobacco Use     Smoking status: Current Every Day Smoker     Packs/day: 0.50     Years: 25.00     Pack years: 12.50     Start date: 1986     Smokeless tobacco: Never Used   Substance Use Topics     Alcohol use: Not Currently     Family History   Problem Relation Age of Onset     Coronary Artery Disease Maternal Grandmother      Hypertension Maternal Grandmother      Unknown/Adopted Maternal Grandmother      Depression Maternal Grandmother      Heart Disease Maternal Grandmother      Stomach Problem Maternal Grandmother      Diabetes Paternal Grandmother      Dementia Paternal Grandmother      Asthma Paternal Grandmother      Rheumatoid Arthritis Mother      Unknown/Adopted Mother      Depression Mother      Myocardial Infarction Father      Hypertension Father      Asthma Father      Depression Father      Heart Disease Father      Back Pain Maternal Grandfather      Substance Abuse  Maternal Grandfather      Dementia Maternal Grandfather      Unknown/Adopted Maternal Grandfather      Rheumatoid Arthritis Maternal Aunt      Stomach Cancer Maternal Aunt      No Known Problems Paternal Grandfather      Myocardial Infarction Paternal Aunt      Cerebrovascular Disease Paternal Aunt      Thyroid Disease Other      Migraines Other      Cancer No family hx of      History   Drug Use Unknown     Comment: 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 In outpatient treatment.          Objective     BP (!) 145/87 (BP Location: Right arm, Patient Position: Sitting, Cuff Size: Adult Regular)   Pulse 81   Temp 97.7  F (36.5  C) (Oral)   Resp 20   Wt 99.7 kg (219 lb 12.8 oz)   LMP 06/30/2011   SpO2 100%   BMI 37.73 kg/m      Physical Exam  GENERAL APPEARANCE: healthy, alert and no distress  HENT: ear canals and TM's normal and nose and mouth without ulcers or lesions  RESP: lungs clear to auscultation - no rales, rhonchi or wheezes  CV: regular rate and rhythm, normal S1 S2, no S3 or S4 and no murmur, click or rub   ABDOMEN: soft, nontender, no HSM or masses and bowel sounds normal  NEURO: Normal strength and tone, sensory exam grossly normal, mentation intact and speech normal    Recent Labs   Lab Test 08/21/20  1027 05/17/19  0958 05/17/19  0953   HGB 11.2*  --  10.5*     --  267     --  140   POTASSIUM 3.6  --  4.1   CR 0.63  --  0.53   A1C  --  5.4  --         Diagnostics:  No labs were ordered during this visit.   No EKG required, no history of coronary heart disease, significant arrhythmia, peripheral arterial disease or other structural heart disease.    Revised Cardiac Risk Index (RCRI):  The patient has the following serious cardiovascular risks for perioperative complications:   - No serious cardiac risks = 0 points          Assessment & Plan   The proposed surgical procedure needs to be scheduled before a pre-operative assessment can be planned. Patient will meet  with Dr. Machado ENT to consultation and scheduling of thyroidectomy and return afterwards to pre-op evaluation.    Signed Electronically by: Burak Jacob MD    Copy of this evaluation report is provided to requesting physician.    Preop [facility] Preop Guidelines    Revised Cardiac Risk Index

## 2020-11-03 NOTE — PROGRESS NOTES
Preceptor Attestation:    Patient seen and evaluated in person. I discussed the patient with the resident. I have verified the content of the note, which accurately reflects my assessment of the patient and the plan of care.   Supervising Physician:  Emiliano Askew MD.

## 2020-11-23 ENCOUNTER — TELEPHONE (OUTPATIENT)
Dept: OTOLARYNGOLOGY | Facility: CLINIC | Age: 50
End: 2020-11-23

## 2020-11-23 NOTE — TELEPHONE ENCOUNTER
Writer called patients mom and left voicemail regarding rescheduling patient appointment with Dr. Del Real. Writer provided voicemail with name and direct line for call back.       Lissa Mccann LPN

## 2020-12-06 ENCOUNTER — HEALTH MAINTENANCE LETTER (OUTPATIENT)
Age: 50
End: 2020-12-06

## 2020-12-29 RX ORDER — CITALOPRAM HYDROBROMIDE 40 MG/1
TABLET ORAL
Qty: 30 TABLET | Refills: 2 | OUTPATIENT
Start: 2020-12-29

## 2021-02-20 ENCOUNTER — HEALTH MAINTENANCE LETTER (OUTPATIENT)
Age: 51
End: 2021-02-20

## 2021-04-03 ENCOUNTER — IMMUNIZATION (OUTPATIENT)
Dept: FAMILY MEDICINE | Facility: CLINIC | Age: 51
End: 2021-04-03
Payer: COMMERCIAL

## 2021-04-03 PROCEDURE — 91303 PR COVID VAC JANSSEN AD26 0.5ML: CPT

## 2021-04-03 PROCEDURE — 0031A PR COVID VAC JANSSEN AD26 0.5ML: CPT

## 2021-05-25 ENCOUNTER — RECORDS - HEALTHEAST (OUTPATIENT)
Dept: ADMINISTRATIVE | Facility: CLINIC | Age: 51
End: 2021-05-25

## 2021-06-24 NOTE — NURSING NOTE
"Chief Complaint   Patient presents with     Consult     left completion thyroidectomy      Blood pressure 115/77, pulse 84, resp. rate 18, height 1.6 m (5' 3\"), weight 87.5 kg (193 lb), last menstrual period 06/30/2011.    Catracho Villalba LPN    " PRE-OP DIAGNOSIS:  Acute cholecystitis 24-Jun-2021 18:56:04  Coni Toth

## 2021-08-09 PROBLEM — C73: Status: ACTIVE | Noted: 2019-10-21

## 2021-09-25 ENCOUNTER — HEALTH MAINTENANCE LETTER (OUTPATIENT)
Age: 51
End: 2021-09-25

## 2022-12-01 NOTE — H&P
Brief PreOp H&P       October 21, 2019    Patients H&P obtained at [facility] on 10/18/2019 by Lawson Swan PA-C. Hard copy faxed and placed in patients chart. I have fully reviewed H&P. There are no concerns in proceeding with surgery. Patient has no medication changes and is still current 1/2ppd smoker.     Shante Solo, PGY-1  Otolaryngology Head and Neck Surgery     Xray Hand 3 Views, Left

## 2022-12-05 ENCOUNTER — HOSPITAL ENCOUNTER (OUTPATIENT)
Facility: CLINIC | Age: 52
End: 2022-12-05
Attending: SURGERY | Admitting: SURGERY
Payer: COMMERCIAL

## 2024-06-28 ENCOUNTER — HOSPITAL ENCOUNTER (EMERGENCY)
Facility: CLINIC | Age: 54
Discharge: HOME | End: 2024-06-28
Attending: EMERGENCY MEDICINE | Admitting: EMERGENCY MEDICINE
Payer: OTHER MISCELLANEOUS

## 2024-06-28 ENCOUNTER — APPOINTMENT (OUTPATIENT)
Dept: GENERAL RADIOLOGY | Facility: CLINIC | Age: 54
End: 2024-06-28
Attending: EMERGENCY MEDICINE
Payer: OTHER MISCELLANEOUS

## 2024-06-28 VITALS
OXYGEN SATURATION: 96 % | BODY MASS INDEX: 34.15 KG/M2 | DIASTOLIC BLOOD PRESSURE: 88 MMHG | SYSTOLIC BLOOD PRESSURE: 122 MMHG | TEMPERATURE: 98.1 F | RESPIRATION RATE: 16 BRPM | WEIGHT: 200 LBS | HEIGHT: 64 IN | HEART RATE: 67 BPM

## 2024-06-28 DIAGNOSIS — S39.012A STRAIN OF LUMBAR REGION, INITIAL ENCOUNTER: ICD-10-CM

## 2024-06-28 PROCEDURE — 99283 EMERGENCY DEPT VISIT LOW MDM: CPT | Performed by: EMERGENCY MEDICINE

## 2024-06-28 PROCEDURE — 72100 X-RAY EXAM L-S SPINE 2/3 VWS: CPT

## 2024-06-28 RX ORDER — METHOCARBAMOL 500 MG/1
500-1000 TABLET, FILM COATED ORAL 3 TIMES DAILY PRN
Qty: 20 TABLET | Refills: 0 | Status: ON HOLD | OUTPATIENT
Start: 2024-06-28 | End: 2024-07-09

## 2024-06-28 RX ORDER — LEVOTHYROXINE SODIUM 150 UG/1
150 TABLET ORAL DAILY
Status: ON HOLD | COMMUNITY
End: 2024-07-09

## 2024-06-28 ASSESSMENT — COLUMBIA-SUICIDE SEVERITY RATING SCALE - C-SSRS
1. IN THE PAST MONTH, HAVE YOU WISHED YOU WERE DEAD OR WISHED YOU COULD GO TO SLEEP AND NOT WAKE UP?: NO
2. HAVE YOU ACTUALLY HAD ANY THOUGHTS OF KILLING YOURSELF IN THE PAST MONTH?: NO
6. HAVE YOU EVER DONE ANYTHING, STARTED TO DO ANYTHING, OR PREPARED TO DO ANYTHING TO END YOUR LIFE?: NO

## 2024-06-28 ASSESSMENT — ACTIVITIES OF DAILY LIVING (ADL): ADLS_ACUITY_SCORE: 35

## 2024-06-28 NOTE — LETTER
June 28, 2024      To Whom It May Concern:      Barbara Camacho was seen in our Emergency Department today, 06/28/24. She may return to work 06/29/24.    Sincerely,        IRAM DODSON MD, MD

## 2024-06-28 NOTE — ED NOTES
Patient discharge with paperwork and instructions. Prescription given along with work note. VSS. A&Ox4. No other concerns at this time.

## 2024-06-28 NOTE — DISCHARGE INSTRUCTIONS
Continue ibuprofen and acetaminophen.  Take Robaxin as needed for muscle spasm.  Apply ice for 20 minutes at a time, 3-4 times per day.  Place a towel between ice pack and your skin.    Follow-up with your primary care clinic in 1 week if not improving.    Return if fever, weakness, difficulty with bowel or bladder control, or other concerns.

## 2024-06-28 NOTE — ED PROVIDER NOTES
[facility] EMERGENCY DEPARTMENT ([facility])    6/28/24      ED PROVIDER NOTE       History     Chief Complaint   Patient presents with    Back Pain     Pt injured back moving dresser at work yesterday. Lower back pain, not relieved by ibuprofen. Denies numbness and tingling.      HPI  Barbara Camacho is a 53 year old female with a history of follicular carcinoma of thyroid s/p right thyroidectomy (2019), ovarian cancer, and cellulitis and abscess who presents to the ED for lower back pain.  Patient states that she was moving a dresser up some stairs at work yesterday when she strained her lower back.  She states that she has some radiation to her right thigh but no further.  She denies any lower extremity weakness or numbness.  No bowel or bladder dysfunction.  No abdominal pain.  No fever.  Patient been taking ibuprofen and acetaminophen but continues to have pain.    Past Medical History  Past Medical History:   Diagnosis Date    Anemia     Anxiety     Cancer (H)     ovarian cancer at age 26 - treated with right oophorectomy. no chemo/radiation    Follicular carcinoma of thyroid (H) 10/30/2019    Gastroesophageal reflux disease     Substance abuse (H)     Thyroid nodule 2016    bx done at [facility]      Past Surgical History:   Procedure Laterality Date    APPENDECTOMY OPEN      angeles and ovary removed all at same time as appy    CHOLECYSTECTOMY      FINE NEEDLE ASPIRATION  2019    thyroid    HERNIA REPAIR      LAPAROSCOPIC BYPASS GASTRIC  7/20/2011    Procedure:LAPAROSCOPIC BYPASS GASTRIC; Hiatal hernia repair; Surgeon:FRANKIE VUONG; Location:[facility] OR    OOPHORECTOMY  2000?    THYROIDECTOMY Right 10/21/2019    Procedure: Right Thyroidectomy;  Surgeon: Glenroy Nichols MD;  Location: [facility] OR     levothyroxine (SYNTHROID/LEVOTHROID) 150 MCG tablet  methocarbamol (ROBAXIN) 500 MG tablet  calcium carbonate (OS-KAVITA) 500 MG tablet  docusate sodium (COLACE) 100 MG capsule  ferrous sulfate (FEROSUL) 325  (65 Fe) MG tablet  hydrOXYzine (ATARAX) 50 MG tablet  Multiple Vitamins-Minerals (TAB-A-DAISY) TABS      Allergies   Allergen Reactions    Codeine Itching    Tylenol W/Codeine [Acetaminophen-Codeine] Hives     Family History  Family History   Problem Relation Age of Onset    Coronary Artery Disease Maternal Grandmother     Hypertension Maternal Grandmother     Unknown/Adopted Maternal Grandmother     Depression Maternal Grandmother     Heart Disease Maternal Grandmother     Stomach Problem Maternal Grandmother     Diabetes Paternal Grandmother     Dementia Paternal Grandmother     Asthma Paternal Grandmother     Rheumatoid Arthritis Mother     Unknown/Adopted Mother     Depression Mother     Myocardial Infarction Father     Hypertension Father     Asthma Father     Depression Father     Heart Disease Father     Back Pain Maternal Grandfather     Substance Abuse Maternal Grandfather     Dementia Maternal Grandfather     Unknown/Adopted Maternal Grandfather     Rheumatoid Arthritis Maternal Aunt     Stomach Cancer Maternal Aunt     No Known Problems Paternal Grandfather     Myocardial Infarction Paternal Aunt     Cerebrovascular Disease Paternal Aunt     Thyroid Disease Other     Migraines Other     Cancer No family hx of      Social History   Social History     Tobacco Use    Smoking status: Every Day     Current packs/day: 0.50     Average packs/day: 0.5 packs/day for 38.5 years (19.2 ttl pk-yrs)     Types: Cigarettes     Start date: 1986    Smokeless tobacco: Never   Substance Use Topics    Alcohol use: Not Currently    Drug use: Not Currently     Comment: 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 In outpatient treatment.       Past medical history, past surgical history, medications, allergies, family history, and social history were reviewed with the patient. No additional pertinent items.   A complete review of systems was performed with pertinent positives and negatives noted in the HPI, and  "all other systems negative.    Physical Exam   BP: (!) 134/90  Pulse: 72  Temp: 97.8  F (36.6  C)  Resp: 16  Height: 162.6 cm (5' 4\")  Weight: 90.7 kg (200 lb)  SpO2: 97 %  Physical Exam  Vitals and nursing note reviewed.   Constitutional:       Appearance: Normal appearance.   HENT:      Head: Normocephalic and atraumatic.   Cardiovascular:      Rate and Rhythm: Normal rate and regular rhythm.   Pulmonary:      Effort: Pulmonary effort is normal.      Breath sounds: Normal breath sounds.   Abdominal:      General: Bowel sounds are normal.      Palpations: Abdomen is soft.      Tenderness: There is no abdominal tenderness.   Musculoskeletal:      Cervical back: Normal and normal range of motion.      Thoracic back: Normal.      Lumbar back: Tenderness present. Decreased range of motion. Negative right straight leg raise test and negative left straight leg raise test.        Back:    Skin:     General: Skin is warm and dry.   Neurological:      General: No focal deficit present.      Mental Status: She is alert.      Sensory: No sensory deficit.      Motor: No weakness.      Gait: Gait normal.           ED Course, Procedures, & Data      Procedures                Results for orders placed or performed during the hospital encounter of 06/28/24   XR Lumbar Spine 2/3 Views     Status: None    Narrative    EXAM: XR LUMBAR SPINE 2/3 VIEWS  LOCATION: [facility]  DATE: 6/28/2024    INDICATION: pain, trauma  COMPARISON: None.      Impression    IMPRESSION: 5 lumbar type vertebra. Vertebral body height is normal. There is 4 mm anterolisthesis of L4 on L5. Disc spaces are preserved. Bony demineralization. No radiographic evidence for acute fracture or subluxation.     Medications - No data to display  Labs Ordered and Resulted from Time of ED Arrival to Time of ED Departure - No data to display  XR Lumbar Spine 2/3 Views   Final Result   IMPRESSION: 5 lumbar type vertebra. " Vertebral body height is normal. There is 4 mm anterolisthesis of L4 on L5. Disc spaces are preserved. Bony demineralization. No radiographic evidence for acute fracture or subluxation.             Critical care was not performed.     Medical Decision Making  The patient's presentation was of moderate complexity (an acute complicated injury).    The patient's evaluation involved:  ordering and/or review of 1 test(s) in this encounter (see separate area of note for details)    The patient's management necessitated moderate risk (prescription drug management including medications given in the ED).    Assessment & Plan    53 year old female to the emergency department with central low back pain after lifting a dresser and carrying it upstairs while at work yesterday.  She does not have any weakness or neurologic abnormality on exam.  She does not have any signs or symptoms of cauda equina syndrome.  Radiograph obtained and reveals L4 on 5 anterolisthesis but no evidence for bony fracture.  Suspect lumbar strain.  Patient will continue ibuprofen and acetaminophen.  Recommended addition of ice.  Prescribed Robaxin for muscle spasm.  Off work for 1 day.  Primary care follow-up in 1 week if not improving.  Return precautions provided.    I have reviewed the nursing notes. I have reviewed the findings, diagnosis, plan and need for follow up with the patient.    New Prescriptions    METHOCARBAMOL (ROBAXIN) 500 MG TABLET    Take 1-2 tablets (500-1,000 mg) by mouth 3 times daily as needed       Final diagnoses:   Strain of lumbar region, initial encounter       Chart documentation was completed with Dragon voice-recognition software. Even though reviewed, this chart may still contain some grammatical, spelling, and word errors.     [facility] EMERGENCY DEPARTMENT  6/28/2024     Bereket Oneal MD  06/28/24 0221

## 2024-07-01 ENCOUNTER — TELEPHONE (OUTPATIENT)
Dept: CARE COORDINATION | Facility: CLINIC | Age: 54
End: 2024-07-01
Payer: COMMERCIAL

## 2024-07-01 NOTE — TELEPHONE ENCOUNTER
Care Coordination: 7/1/2024    Support Call offering an in clinic PCP follow up appointment. I was not able to reach her today via phone. I will send a letter suggesting that she contact the clinic for a follow up.            Arley Muniz Sr.   Care Coordination  [address] [address]  [email or web address]   Office: [phone] Direct: [phone]  [facility]

## 2024-07-01 NOTE — LETTER
July 1, 2024      Barbara Camacho  [address] [place] [address]        Dear Barbara,  I am one of the Care Coordinators at: [facility] located at: [address], [place] [address]. I have been trying to reach you via phone. However, my attempts have not been successful. My reason for contacting  you is  an attempt to get you scheduled for an in clinic primary care follow up appointment after your recent emergency room visit. If you are interested in scheduling, please contact the clinic at:[phone].            Sincerely,          Arley Muniz Sr.   Care Coordination  [address] [address]  [email or web address]   Office: [phone] Direct: [phone]  [facility]

## 2024-07-07 ENCOUNTER — HOSPITAL ENCOUNTER (OUTPATIENT)
Facility: HOSPITAL | Age: 54
Setting detail: OBSERVATION
Discharge: HOME | End: 2024-07-09
Attending: EMERGENCY MEDICINE | Admitting: FAMILY MEDICINE
Payer: OTHER MISCELLANEOUS

## 2024-07-07 ENCOUNTER — APPOINTMENT (OUTPATIENT)
Dept: CT IMAGING | Facility: HOSPITAL | Age: 54
End: 2024-07-07
Attending: EMERGENCY MEDICINE
Payer: OTHER MISCELLANEOUS

## 2024-07-07 DIAGNOSIS — S32.001A CLOSED BURST FRACTURE OF LUMBAR VERTEBRA, INITIAL ENCOUNTER (H): ICD-10-CM

## 2024-07-07 DIAGNOSIS — C73 FOLLICULAR CARCINOMA OF THYROID (H): Primary | ICD-10-CM

## 2024-07-07 PROCEDURE — 72131 CT LUMBAR SPINE W/O DYE: CPT

## 2024-07-07 PROCEDURE — 99285 EMERGENCY DEPT VISIT HI MDM: CPT | Mod: 25

## 2024-07-07 ASSESSMENT — ENCOUNTER SYMPTOMS
FLANK PAIN: 0
BACK PAIN: 1
NUMBNESS: 0
FEVER: 0
DYSURIA: 0

## 2024-07-07 ASSESSMENT — ACTIVITIES OF DAILY LIVING (ADL): ADLS_ACUITY_SCORE: 35

## 2024-07-07 ASSESSMENT — COLUMBIA-SUICIDE SEVERITY RATING SCALE - C-SSRS
1. IN THE PAST MONTH, HAVE YOU WISHED YOU WERE DEAD OR WISHED YOU COULD GO TO SLEEP AND NOT WAKE UP?: NO
6. HAVE YOU EVER DONE ANYTHING, STARTED TO DO ANYTHING, OR PREPARED TO DO ANYTHING TO END YOUR LIFE?: NO
2. HAVE YOU ACTUALLY HAD ANY THOUGHTS OF KILLING YOURSELF IN THE PAST MONTH?: NO

## 2024-07-07 NOTE — LETTER
1970      To Whom it may concern:    Barbara Camacho was seen in our hospital, 07/07/2024 until 07/09/2024 for an injury.      For the next 7 days she should not work.    After returning to work, the following restrictions apply until 8/2/2024:  A) Bend: Not at all (0 hours)  B) Squat: Not at all (0 hours)  C) Walk/Stand: Occasionally (1-3 hours)  D) Reach above shoulders: Not at all (0 hours)  E) Lift, carry, push and pull no more than: 0-5 lbs.    The employee might be taking medication so that they cannot operate moving machinery or perform activities that require balancing or working above ground.    Follow up: scheduled in 3 weeks from discharge.    Sincerely,    Farrah Sandy PA-C  [facility]

## 2024-07-08 ENCOUNTER — APPOINTMENT (OUTPATIENT)
Dept: RADIOLOGY | Facility: HOSPITAL | Age: 54
End: 2024-07-08
Attending: PHYSICIAN ASSISTANT
Payer: OTHER MISCELLANEOUS

## 2024-07-08 ENCOUNTER — APPOINTMENT (OUTPATIENT)
Dept: MRI IMAGING | Facility: HOSPITAL | Age: 54
End: 2024-07-08
Attending: INTERNAL MEDICINE
Payer: OTHER MISCELLANEOUS

## 2024-07-08 DIAGNOSIS — S32.040A CLOSED WEDGE COMPRESSION FRACTURE OF L4 VERTEBRA, INITIAL ENCOUNTER (H): Primary | ICD-10-CM

## 2024-07-08 PROBLEM — R74.01 ELEVATED TRANSAMINASE LEVEL: Status: ACTIVE | Noted: 2024-07-08

## 2024-07-08 PROBLEM — C73: Status: ACTIVE | Noted: 2019-10-30

## 2024-07-08 LAB
ALBUMIN SERPL BCG-MCNC: 4.1 G/DL (ref 3.5–5.2)
ALP SERPL-CCNC: 85 U/L (ref 40–150)
ALT SERPL W P-5'-P-CCNC: 143 U/L (ref 0–50)
AMPHETAMINES UR QL SCN: ABNORMAL
ANION GAP SERPL CALCULATED.3IONS-SCNC: 9 MMOL/L (ref 7–15)
ANION GAP SERPL CALCULATED.3IONS-SCNC: 9 MMOL/L (ref 7–15)
AST SERPL W P-5'-P-CCNC: 125 U/L (ref 0–45)
BARBITURATES UR QL SCN: ABNORMAL
BENZODIAZ UR QL SCN: ABNORMAL
BILIRUB SERPL-MCNC: <0.2 MG/DL
BUN SERPL-MCNC: 16.6 MG/DL (ref 6–20)
BUN SERPL-MCNC: 17.9 MG/DL (ref 6–20)
BZE UR QL SCN: ABNORMAL
CALCIUM SERPL-MCNC: 8.3 MG/DL (ref 8.6–10)
CALCIUM SERPL-MCNC: 8.3 MG/DL (ref 8.6–10)
CANNABINOIDS UR QL SCN: ABNORMAL
CHLORIDE SERPL-SCNC: 103 MMOL/L (ref 98–107)
CHLORIDE SERPL-SCNC: 106 MMOL/L (ref 98–107)
CREAT SERPL-MCNC: 1.06 MG/DL (ref 0.51–0.95)
CREAT SERPL-MCNC: 1.1 MG/DL (ref 0.51–0.95)
DEPRECATED HCO3 PLAS-SCNC: 27 MMOL/L (ref 22–29)
DEPRECATED HCO3 PLAS-SCNC: 28 MMOL/L (ref 22–29)
EGFRCR SERPLBLD CKD-EPI 2021: 60 ML/MIN/1.73M2
EGFRCR SERPLBLD CKD-EPI 2021: 63 ML/MIN/1.73M2
ERYTHROCYTE [DISTWIDTH] IN BLOOD BY AUTOMATED COUNT: 15.6 % (ref 10–15)
ERYTHROCYTE [DISTWIDTH] IN BLOOD BY AUTOMATED COUNT: 15.9 % (ref 10–15)
FENTANYL UR QL: ABNORMAL
GLUCOSE BLDC GLUCOMTR-MCNC: 116 MG/DL (ref 70–99)
GLUCOSE BLDC GLUCOMTR-MCNC: 120 MG/DL (ref 70–99)
GLUCOSE BLDC GLUCOMTR-MCNC: 125 MG/DL (ref 70–99)
GLUCOSE BLDC GLUCOMTR-MCNC: 78 MG/DL (ref 70–99)
GLUCOSE SERPL-MCNC: 80 MG/DL (ref 70–99)
GLUCOSE SERPL-MCNC: 85 MG/DL (ref 70–99)
HCT VFR BLD AUTO: 35.8 % (ref 35–47)
HCT VFR BLD AUTO: 36.1 % (ref 35–47)
HGB BLD-MCNC: 11.9 G/DL (ref 11.7–15.7)
HGB BLD-MCNC: 11.9 G/DL (ref 11.7–15.7)
MAGNESIUM SERPL-MCNC: 2.2 MG/DL (ref 1.7–2.3)
MCH RBC QN AUTO: 30.8 PG (ref 26.5–33)
MCH RBC QN AUTO: 31.3 PG (ref 26.5–33)
MCHC RBC AUTO-ENTMCNC: 33 G/DL (ref 31.5–36.5)
MCHC RBC AUTO-ENTMCNC: 33.2 G/DL (ref 31.5–36.5)
MCV RBC AUTO: 94 FL (ref 78–100)
MCV RBC AUTO: 94 FL (ref 78–100)
OPIATES UR QL SCN: ABNORMAL
PCP QUAL URINE (ROCHE): ABNORMAL
PHOSPHATE SERPL-MCNC: 3.6 MG/DL (ref 2.5–4.5)
PLATELET # BLD AUTO: 220 10E3/UL (ref 150–450)
PLATELET # BLD AUTO: 230 10E3/UL (ref 150–450)
POTASSIUM SERPL-SCNC: 3.8 MMOL/L (ref 3.4–5.3)
POTASSIUM SERPL-SCNC: 3.8 MMOL/L (ref 3.4–5.3)
PROT SERPL-MCNC: 6.8 G/DL (ref 6.4–8.3)
RBC # BLD AUTO: 3.8 10E6/UL (ref 3.8–5.2)
RBC # BLD AUTO: 3.86 10E6/UL (ref 3.8–5.2)
SODIUM SERPL-SCNC: 139 MMOL/L (ref 135–145)
SODIUM SERPL-SCNC: 143 MMOL/L (ref 135–145)
WBC # BLD AUTO: 6.8 10E3/UL (ref 4–11)
WBC # BLD AUTO: 6.8 10E3/UL (ref 4–11)

## 2024-07-08 PROCEDURE — A9585 GADOBUTROL INJECTION: HCPCS | Performed by: INTERNAL MEDICINE

## 2024-07-08 PROCEDURE — 258N000003 HC RX IP 258 OP 636: Performed by: INTERNAL MEDICINE

## 2024-07-08 PROCEDURE — 80048 BASIC METABOLIC PNL TOTAL CA: CPT | Performed by: STUDENT IN AN ORGANIZED HEALTH CARE EDUCATION/TRAINING PROGRAM

## 2024-07-08 PROCEDURE — 80307 DRUG TEST PRSMV CHEM ANLYZR: CPT

## 2024-07-08 PROCEDURE — 255N000002 HC RX 255 OP 636: Performed by: INTERNAL MEDICINE

## 2024-07-08 PROCEDURE — 85027 COMPLETE CBC AUTOMATED: CPT | Mod: 91 | Performed by: STUDENT IN AN ORGANIZED HEALTH CARE EDUCATION/TRAINING PROGRAM

## 2024-07-08 PROCEDURE — G0378 HOSPITAL OBSERVATION PER HR: HCPCS

## 2024-07-08 PROCEDURE — 99222 1ST HOSP IP/OBS MODERATE 55: CPT | Performed by: STUDENT IN AN ORGANIZED HEALTH CARE EDUCATION/TRAINING PROGRAM

## 2024-07-08 PROCEDURE — 99207 PR APP CREDIT; MD BILLING SHARED VISIT: CPT | Performed by: INTERNAL MEDICINE

## 2024-07-08 PROCEDURE — 72158 MRI LUMBAR SPINE W/O & W/DYE: CPT

## 2024-07-08 PROCEDURE — 250N000013 HC RX MED GY IP 250 OP 250 PS 637: Performed by: STUDENT IN AN ORGANIZED HEALTH CARE EDUCATION/TRAINING PROGRAM

## 2024-07-08 PROCEDURE — 36415 COLL VENOUS BLD VENIPUNCTURE: CPT | Performed by: STUDENT IN AN ORGANIZED HEALTH CARE EDUCATION/TRAINING PROGRAM

## 2024-07-08 PROCEDURE — 83735 ASSAY OF MAGNESIUM: CPT | Performed by: STUDENT IN AN ORGANIZED HEALTH CARE EDUCATION/TRAINING PROGRAM

## 2024-07-08 PROCEDURE — 72100 X-RAY EXAM L-S SPINE 2/3 VWS: CPT

## 2024-07-08 PROCEDURE — 82962 GLUCOSE BLOOD TEST: CPT

## 2024-07-08 PROCEDURE — 84100 ASSAY OF PHOSPHORUS: CPT | Performed by: STUDENT IN AN ORGANIZED HEALTH CARE EDUCATION/TRAINING PROGRAM

## 2024-07-08 PROCEDURE — 99204 OFFICE O/P NEW MOD 45 MIN: CPT | Performed by: PHYSICIAN ASSISTANT

## 2024-07-08 PROCEDURE — 80325 AMPHETAMINES 3OR 4: CPT

## 2024-07-08 RX ORDER — METHOCARBAMOL 500 MG/1
500 TABLET, FILM COATED ORAL 3 TIMES DAILY PRN
Status: DISCONTINUED | OUTPATIENT
Start: 2024-07-08 | End: 2024-07-09 | Stop reason: HOSPADM

## 2024-07-08 RX ORDER — ACETAMINOPHEN 500 MG
500-1000 TABLET ORAL EVERY 6 HOURS PRN
Status: ON HOLD | COMMUNITY
End: 2024-07-09

## 2024-07-08 RX ORDER — SODIUM CHLORIDE 9 MG/ML
INJECTION, SOLUTION INTRAVENOUS CONTINUOUS
Status: DISCONTINUED | OUTPATIENT
Start: 2024-07-08 | End: 2024-07-09

## 2024-07-08 RX ORDER — GADOBUTROL 604.72 MG/ML
10 INJECTION INTRAVENOUS ONCE
Status: COMPLETED | OUTPATIENT
Start: 2024-07-08 | End: 2024-07-08

## 2024-07-08 RX ORDER — HYDROMORPHONE HCL IN WATER/PF 6 MG/30 ML
0.4 PATIENT CONTROLLED ANALGESIA SYRINGE INTRAVENOUS
Status: DISCONTINUED | OUTPATIENT
Start: 2024-07-08 | End: 2024-07-09

## 2024-07-08 RX ORDER — LIDOCAINE 40 MG/G
CREAM TOPICAL
Status: DISCONTINUED | OUTPATIENT
Start: 2024-07-08 | End: 2024-07-09 | Stop reason: HOSPADM

## 2024-07-08 RX ORDER — AMOXICILLIN 250 MG
2 CAPSULE ORAL 2 TIMES DAILY PRN
Status: DISCONTINUED | OUTPATIENT
Start: 2024-07-08 | End: 2024-07-09 | Stop reason: HOSPADM

## 2024-07-08 RX ORDER — ACETAMINOPHEN 500 MG
500 TABLET ORAL EVERY 6 HOURS PRN
Status: DISCONTINUED | OUTPATIENT
Start: 2024-07-08 | End: 2024-07-09 | Stop reason: HOSPADM

## 2024-07-08 RX ORDER — OXYCODONE HYDROCHLORIDE 5 MG/1
5 TABLET ORAL EVERY 4 HOURS PRN
Status: DISCONTINUED | OUTPATIENT
Start: 2024-07-08 | End: 2024-07-09 | Stop reason: HOSPADM

## 2024-07-08 RX ORDER — IBUPROFEN 200 MG
800 TABLET ORAL EVERY 8 HOURS PRN
Status: ON HOLD | COMMUNITY
End: 2024-07-09

## 2024-07-08 RX ORDER — CALCIUM CARBONATE 500 MG/1
1000 TABLET, CHEWABLE ORAL 4 TIMES DAILY PRN
Status: DISCONTINUED | OUTPATIENT
Start: 2024-07-08 | End: 2024-07-09 | Stop reason: HOSPADM

## 2024-07-08 RX ORDER — HYDROMORPHONE HCL IN WATER/PF 6 MG/30 ML
0.2 PATIENT CONTROLLED ANALGESIA SYRINGE INTRAVENOUS
Status: DISCONTINUED | OUTPATIENT
Start: 2024-07-08 | End: 2024-07-09

## 2024-07-08 RX ORDER — ONDANSETRON 2 MG/ML
4 INJECTION INTRAMUSCULAR; INTRAVENOUS EVERY 6 HOURS PRN
Status: DISCONTINUED | OUTPATIENT
Start: 2024-07-08 | End: 2024-07-09 | Stop reason: HOSPADM

## 2024-07-08 RX ORDER — AMOXICILLIN 250 MG
1 CAPSULE ORAL 2 TIMES DAILY PRN
Status: DISCONTINUED | OUTPATIENT
Start: 2024-07-08 | End: 2024-07-09 | Stop reason: HOSPADM

## 2024-07-08 RX ORDER — ONDANSETRON 4 MG/1
4 TABLET, ORALLY DISINTEGRATING ORAL EVERY 6 HOURS PRN
Status: DISCONTINUED | OUTPATIENT
Start: 2024-07-08 | End: 2024-07-09 | Stop reason: HOSPADM

## 2024-07-08 RX ADMIN — LEVOTHYROXINE SODIUM 150 MCG: 0.03 TABLET ORAL at 08:37

## 2024-07-08 RX ADMIN — OXYCODONE HYDROCHLORIDE 5 MG: 5 TABLET ORAL at 19:54

## 2024-07-08 RX ADMIN — OXYCODONE HYDROCHLORIDE 5 MG: 5 TABLET ORAL at 01:03

## 2024-07-08 RX ADMIN — OXYCODONE HYDROCHLORIDE 5 MG: 5 TABLET ORAL at 14:21

## 2024-07-08 RX ADMIN — GADOBUTROL 10 ML: 604.72 INJECTION INTRAVENOUS at 18:36

## 2024-07-08 RX ADMIN — OXYCODONE HYDROCHLORIDE 5 MG: 5 TABLET ORAL at 08:37

## 2024-07-08 RX ADMIN — METHOCARBAMOL 500 MG: 500 TABLET ORAL at 14:21

## 2024-07-08 RX ADMIN — SODIUM CHLORIDE, PRESERVATIVE FREE: 5 INJECTION INTRAVENOUS at 19:54

## 2024-07-08 ASSESSMENT — ACTIVITIES OF DAILY LIVING (ADL)
ADLS_ACUITY_SCORE: 37
ADLS_ACUITY_SCORE: 24
ADLS_ACUITY_SCORE: 20
ADLS_ACUITY_SCORE: 24
ADLS_ACUITY_SCORE: 21
ADLS_ACUITY_SCORE: 25
ADLS_ACUITY_SCORE: 36
ADLS_ACUITY_SCORE: 20
ADLS_ACUITY_SCORE: 21
ADLS_ACUITY_SCORE: 21
ADLS_ACUITY_SCORE: 36
ADLS_ACUITY_SCORE: 36
ADLS_ACUITY_SCORE: 24
ADLS_ACUITY_SCORE: 24
ADLS_ACUITY_SCORE: 37
ADLS_ACUITY_SCORE: 21
ADLS_ACUITY_SCORE: 36
ADLS_ACUITY_SCORE: 24
ADLS_ACUITY_SCORE: 21
ADLS_ACUITY_SCORE: 21
ADLS_ACUITY_SCORE: 24
ADLS_ACUITY_SCORE: 20
ADLS_ACUITY_SCORE: 25

## 2024-07-08 NOTE — PROGRESS NOTES
NSGY pager note    Paged by and discussed with Dr. Muller    53 year old female developed back pain while moving a dresser at end of June 2024. Was seen at , XR done noting 4mm anterolisthesis L4-L5, discharged with robaxin. Patient presented to ED for ongoing back pain. Noting development of pain in bilateral thigh. No lower extremity paresthesias, saddle anesthesia, acute bowel or bladder dysfunction. Neurologically intact per ED provider. Lumbar CT noting mid to moderate subacute burst fracture L4 with 50% height loss and minimal retropulsion.     Plan:  -Admit under hospitalist services  - Lumbar MRI w/o contrast, further recs pending  - Bedrest until MRI results  - Continue to monitor neuro exam every 4 hours  - Full NSGY consult tomorrow AM  - Pain control per primary team     Plan to review with Dr. Ronquillo tomorrow AM.     Donya Zhong PA-C  [facility]  [address] [place] [id number]

## 2024-07-08 NOTE — PROGRESS NOTES
[place] through [place] [employer] ([phone] opt. 1) on and in need of MRI; SW placed call to agency requesting staff come and remove said device for pt. Staff stated that hospital team is able to cut monitor off and pt will need to then go to [place] location to get new one placed once discharge to serve out rest of time. SW spoke with pt and gave said information. Pt agreeable.  9:59 AM    Brenna Kjellberg, BSW LSW  7/8/2024

## 2024-07-08 NOTE — ED NOTES
[facility] ED Handoff Report    ED Chief Complaint: Leg pain; back pain    ED Diagnosis:  (S32.001A) Closed burst fracture of lumbar vertebra, initial encounter (H)       PMH:    Past Medical History:   Diagnosis Date    Anemia     Anxiety     Cancer (H)     ovarian cancer at age 26 - treated with right oophorectomy. no chemo/radiation    Follicular carcinoma of thyroid (H) 10/30/2019    Gastroesophageal reflux disease     Substance abuse (H)     Thyroid nodule 2016    bx done at [facility]         Code Status:  Full Code     Falls Risk: Yes Band: Applied    Current Living Situation/Residence: lives in a house     Elimination Status: Continent: Yes     Activity Level: SBA    Patients Preferred Language:  English     Needed: No    Vital Signs:  /80   Pulse 64   Temp 97.7  F (36.5  C) (Oral)   Resp 18   LMP 06/30/2011   SpO2 98%      Cardiac Rhythm:     Pain Score: 5/10    Is the Patient Confused:  No    Last Food or Drink: 7/7/24.    Focused Assessment:  Pt is alert and oriented x4. Calm and pleasant; pt able to make needs known. Pt c/o low back pain that radiates to bilateral hips down to mid thighs. Pt rates pain 8/10. PRN Oxycodone given x1 with decrease in pain to 5/10 and comfortable for the pt. Pain medications reviewed with the pt but pt declines any more pain medications at this time. Pt denies any numbness of tingling raymond BUE and BLE.     Tests Performed: Done: Labs and Imaging    Treatments Provided:  PO pain medication.    Family Dynamics/Concerns: No    Family Updated On Visitor Policy: Yes    Plan of Care Communicated to Family: Yes    Who Was Updated about Plan of Care: Pt's family who is at bedside.    Belongings Checklist Done and Signed by Patient: Yes    Belongings Sent with Patient: Remains with pt.    Medications sent with patient: NA.    Covid: asymptomatic , not tested.    Additional Information: NA    RN: Patrice Yoder RN 7/8/2024 1:45 AM

## 2024-07-08 NOTE — MEDICATION SCRIBE - ADMISSION MEDICATION HISTORY
Medication Scribe Admission Medication History    Admission medication history is complete. The information provided in this note is only as accurate as the sources available at the time of the update.    Information Source(s): Patient via in-person    Pertinent Information:     Changes made to PTA medication list:  Added: Tylenol, Ibuprofen (per patient)  Deleted: Calcium carbonate, Docusate, Ferrous sulfate, Hydroxyzine (per patient)  Changed: None    Allergies reviewed with patient and updates made in EHR: yes    Medication History Completed By: Keira Moon 7/8/2024 12:23 AM    PTA Med List   Medication Sig Last Dose    acetaminophen (TYLENOL) 500 MG tablet Take 500-1,000 mg by mouth every 6 hours as needed for mild pain Unknown at prn    ibuprofen (ADVIL/MOTRIN) 200 MG tablet Take 800 mg by mouth every 8 hours as needed for pain Unknown at prn    levothyroxine (SYNTHROID/LEVOTHROID) 150 MCG tablet Take 150 mcg by mouth daily 7/4/2024 at am    methocarbamol (ROBAXIN) 500 MG tablet Take 1-2 tablets (500-1,000 mg) by mouth 3 times daily as needed Unknown at prn    Multiple Vitamins-Minerals (TAB-A-DAISY) TABS Take 1 tablet by mouth daily 7/7/2024 at am

## 2024-07-08 NOTE — PLAN OF CARE
PRIMARY DIAGNOSIS: ACUTE PAIN  OUTPATIENT/OBSERVATION GOALS TO BE MET BEFORE DISCHARGE:  1. Pain Status: Improved-controlled with oral pain medications.    2. Return to near baseline physical activity: Yes    3. Cleared for discharge by consultants (if involved): No    Discharge Planner Nurse   Safe discharge environment identified: Yes  Barriers to discharge: Yes       Entered by: Farzaneh Muniz RN 07/08/2024 2:02 PM     Please review provider order for any additional goals.   Nurse to notify provider when observation goals have been met and patient is ready for discharge.Goal Outcome Evaluation:       VSS. Patient alert and orientated, able to make needs known. Patient bed rest, awaiting back brace.

## 2024-07-08 NOTE — PLAN OF CARE
"  Problem: Adult Inpatient Plan of Care  Goal: Patient-Specific Goal (Individualized)  Description: You can add care plan individualizations to a care plan. Examples of Individualization might be:  \"Parent requests to be called daily at 9am for status\", \"I have a hard time hearing out of my right ear\", or \"Do not touch me to wake me up as it startles  me\".  Outcome: Progressing     Problem: Adult Inpatient Plan of Care  Goal: Optimal Comfort and Wellbeing  Outcome: Progressing  Intervention: Monitor Pain and Promote Comfort  Recent Flowsheet Documentation  Taken 7/8/2024 3439 by Farzaneh Muniz, RN  Pain Management Interventions:   pain management plan reviewed with patient/caregiver   rest   Goal Outcome Evaluation:      Plan of Care Reviewed With: patient      VSS. Patient alert and orientated x4, using call light appropriately. Bed rest until fitted for TLSO brace, pure wick  in place. Patient medicated for back pain twice this shift. Scheduled  muscle relaxer given. Patient awaiting MRI to be done, check list faxed. Continue to monitor           "

## 2024-07-08 NOTE — CONSULTS
NEUROSURGERY CONSULT        PLAN:    Ms. Camacho's most recent imaging, lumbar CT and x-rays, exhibit a mild to moderate subacute burst fracture at L4 with approximately 50% loss of height and minimal retropulsion. This is stable compared to the plain film 6/28/2024.     We have ordered a lumbar MRI to be obtained. Unfortunately this has been delayed as she is on house arrest and needed to have her ankle monitor cut off. Now that this has been removed she will be getting the MRI later this afternoon.     We will ask our colleagues on the Orthotics team to fit, supply and provide donning and doffing instructions for an off the shelf TLSO. The OTS spinal brace will be used to reduce pain by restricting mobility of the trunk, to facilitate healing following the injury to the spine and related soft tissue, and to support weak spinal muscles. We instructed the patient to wear the brace when out of bed, but may shower without the brace on. The brace will most likely be required for 3 months.     We will order upright x-rays to include ap and lateral views to be obtained in the brace prior to discharge.     Our team will also facilitate a follow-up evaluation in approximately three weeks with updated imaging. Should she not obtain appropriate relief at the end of three weeks with medical management and bracing, consideration of a vertebroplasty/kyphoplasty or other surgical interventions by our team would be appropriate.     In the interim, we feel that it would be in her best interest to proceed with a conservative approach by pain management set forth by the Medical team here at [facility]. We do suggest that she not lift anything greater than 5-10 pounds and avoid excessive bending, twisting, and turning.     We did review the images together and we explained her condition and the recommended treatment.     Please page our on-call service for any questions or concerns.     It has been a pleasure meeting Barbara LOYD  Janice. Thank you for having us be involved in her care.    ______________________________________________________________________    HPI:    Barbara Camacho is a pleasant 53 year old female who presented to the [place] ED yesterday for consultation on  her low lumbar spine pain. She describes the symptoms as a constant, but improving, dull, aching pain that initiates in the midline low lumbar region and radiates distally, bilaterally to her anterior and lateral thighs in no specific  distribution. This pain is not accompanied with paresthesia, numbness or weakness. The symptoms have been present since 06/26/2024 at which time she was moving a dresser. She was then evaluated at an Urgent Care facility where she had some imaging obtained showing a 4mm anterolisthesis of L4 on L5. She was sent home robaxin. Due to unrelenting pain she returned here to the ED.  Prolonged walking and standing aggravate the symptoms, while leg pain is alleviated by lying down. Unfortunately this exacerbates her pain pain. She appears quite comfortable as I evaluate her.      There are no bowel or bladder changes. No other concerns are voiced.    Past Medical History:   Diagnosis Date    Anemia     Anxiety     Cancer (H)     ovarian cancer at age 26 - treated with right oophorectomy. no chemo/radiation    Follicular carcinoma of thyroid (H) 10/30/2019    Gastroesophageal reflux disease     Substance abuse (H)     Thyroid nodule 2016    bx done at [facility]        Past Surgical History:   Procedure Laterality Date    APPENDECTOMY OPEN      angeles and ovary removed all at same time as appy    CHOLECYSTECTOMY      FINE NEEDLE ASPIRATION  2019    thyroid    HERNIA REPAIR      LAPAROSCOPIC BYPASS GASTRIC  7/20/2011    Procedure:LAPAROSCOPIC BYPASS GASTRIC; Hiatal hernia repair; Surgeon:FRANKIE VUONG; Location:[facility] OR    OOPHORECTOMY  2000?    THYROIDECTOMY Right 10/21/2019    Procedure: Right Thyroidectomy;  Surgeon: Glenroy Nichols MD;   Location: [facility] OR       Allergies   Allergen Reactions    Codeine Itching    Tylenol W/Codeine [Acetaminophen-Codeine] Hives       Social History     Tobacco Use    Smoking status: Every Day     Current packs/day: 0.50     Average packs/day: 0.5 packs/day for 38.5 years (19.3 ttl pk-yrs)     Types: Cigarettes     Start date: 1986    Smokeless tobacco: Never   Substance Use Topics    Alcohol use: Not Currently       Family History   Problem Relation Age of Onset    Coronary Artery Disease Maternal Grandmother     Hypertension Maternal Grandmother     Unknown/Adopted Maternal Grandmother     Depression Maternal Grandmother     Heart Disease Maternal Grandmother     Stomach Problem Maternal Grandmother     Diabetes Paternal Grandmother     Dementia Paternal Grandmother     Asthma Paternal Grandmother     Rheumatoid Arthritis Mother     Unknown/Adopted Mother     Depression Mother     Myocardial Infarction Father     Hypertension Father     Asthma Father     Depression Father     Heart Disease Father     Back Pain Maternal Grandfather     Substance Abuse Maternal Grandfather     Dementia Maternal Grandfather     Unknown/Adopted Maternal Grandfather     Rheumatoid Arthritis Maternal Aunt     Stomach Cancer Maternal Aunt     No Known Problems Paternal Grandfather     Myocardial Infarction Paternal Aunt     Cerebrovascular Disease Paternal Aunt     Thyroid Disease Other     Migraines Other     Cancer No family hx of        Scheduled Medications    Current Facility-Administered Medications   Medication Dose Route Frequency Provider Last Rate Last Admin    levothyroxine (SYNTHROID/LEVOTHROID) tablet 150 mcg  150 mcg Oral Daily Gondal, Saad J, MD   150 mcg at 07/08/24 0837    sodium chloride (PF) 0.9% PF flush 3 mL  3 mL Intracatheter Q8H Gondal, Saad J, MD   3 mL at 07/08/24 0346     Home Medications    Multiple Vitamins-Minerals (TAB-A-DAISY) TABS 7/7/2024 at am Self Yes Yes   Sig: Take 1 tablet by mouth daily   acetaminophen  (TYLENOL) 500 MG tablet Unknown at prn Self Yes Yes   Sig: Take 500-1,000 mg by mouth every 6 hours as needed for mild pain   ibuprofen (ADVIL/MOTRIN) 200 MG tablet Unknown at prn Self Yes Yes   Sig: Take 800 mg by mouth every 8 hours as needed for pain   levothyroxine (SYNTHROID/LEVOTHROID) 150 MCG tablet 7/4/2024 at am Self Yes Yes   Sig: Take 150 mcg by mouth daily   methocarbamol (ROBAXIN) 500 MG tablet Unknown at prn Self No Yes   Sig: Take 1-2 tablets (500-1,000 mg) by mouth 3 times daily as needed     PRN Medications    Current Facility-Administered Medications   Medication Dose Route Frequency Provider Last Rate Last Admin    acetaminophen (TYLENOL) tablet 500 mg  500 mg Oral Q6H PRN Gondal, Saad J, MD        calcium carbonate (TUMS) chewable tablet 1,000 mg  1,000 mg Oral 4x Daily PRN Gondal, Saad J, MD        HYDROmorphone (DILAUDID) injection 0.2 mg  0.2 mg Intravenous Q2H PRN Gondal, Saad J, MD        HYDROmorphone (DILAUDID) injection 0.4 mg  0.4 mg Intravenous Q2H PRN Gondal, Saad J, MD        lidocaine (LMX4) cream   Topical Q1H PRN Gondal, Saad J, MD        lidocaine 1 % 0.1-1 mL  0.1-1 mL Other Q1H PRN Gondal, Saad J, MD        methocarbamol (ROBAXIN) tablet 500 mg  500 mg Oral TID PRN Gondal, Saad J, MD        ondansetron (ZOFRAN ODT) ODT tab 4 mg  4 mg Oral Q6H PRN Gondal, Saad J, MD        Or    ondansetron (ZOFRAN) injection 4 mg  4 mg Intravenous Q6H PRN Gondal, Saad J, MD        oxyCODONE (ROXICODONE) tablet 5 mg  5 mg Oral Q4H PRN Gondal, Saad J, MD   5 mg at 07/08/24 0837    oxyCODONE IR (ROXICODONE) half-tab 2.5 mg  2.5 mg Oral Q4H PRN Gondal, Saad J, MD        senna-docusate (SENOKOT-S/PERICOLACE) 8.6-50 MG per tablet 1 tablet  1 tablet Oral BID PRN Gondal, Saad J, MD        Or    senna-docusate (SENOKOT-S/PERICOLACE) 8.6-50 MG per tablet 2 tablet  2 tablet Oral BID PRN Gondal, Saad J, MD        sodium chloride (PF) 0.9% PF flush 3 mL  3 mL Intracatheter q1 min prn Gondal, Saad J, MD   3  "mL at 07/08/24 0839     ROS: 10 point ROS neg other than the symptoms noted above in the HPI.    Vitals:    /74 (BP Location: Left arm)   Pulse 63   Temp 98.1  F (36.7  C) (Oral)   Resp 15   Ht 1.626 m (5' 4\")   Wt 99.6 kg (219 lb 9.3 oz)   LMP 06/30/2011   SpO2 95%   BMI 37.69 kg/m    Body mass index is 37.69 kg/m .  No intake/output data recorded.    Exam:    Patient appears comfortable, conversational, and in no apparent distress.   Head: Normocephalic, without obvious abnormality, atraumatic, no facial asymmetry.   Eyes: conjunctivae/corneas clear. PERRL, EOM's intact.   Throat: lips, mucosa, and tongue normal; teeth and gums normal.   Neck: supple, symmetrical, trachea midline, no adenopathy and thyroid: not enlarged, symmetric, no tenderness/mass/nodules.   Lungs: clear to auscultation bilaterally.   Heart: regular rate and rhythm.   Abdomen: soft, non-tender; bowel sounds normal; no masses, no organomegaly.   Pulses: 2+ and symmetric.   Skin: Skin color, texture, turgor normal. No rashes or lesions.   CN II-XII grossly intact, alert and appropriate with conversation and following commands.   Cervical spine is non tender to palpation. Appropriate range of motion of neck, not concerning for lhermitte's phenomenon.   Bilateral bicep 2/4 and tricep reflexes 1/4. Sensation intact throughout upper extremities.     UE muscle strength  Right  Left    Deltoid  5/5  5/5    Biceps  5/5  5/5    Triceps  5/5  5/5    Hand intrinsics  5/5  5/5    Hand grasp  5/5  5/5    Winn signs  neg  neg      Lumbar spine is slightly tender to palpation.   Intact sensation throughout lower extremities.   Bilateral patellar 2/4 and achilles reflex 1/4. Negative for pain with straight leg raise.     LE muscle strength  Right  Left    Iliopsoas (hip flexion)  5/5  5/5    Quad (knee extension)  5/5  5/5    Hamstring (knee flexion)  5/5  5/5    Gastrocnemius (PF)  5/5  5/5    Tibialis Ant. (DF)  5/5  5/5    EHL  5/5  5/5  " "    Negative Babinski bilaterally. Negative for clonus.   Calves are soft and non-tender bilaterally.     Imaging: CT LUMBAR SPINE W/O CONTRAST - 7/7/2024  Impression per radiology read - I have personally reviewed the images with the patient.    1.  Mild to moderate subacute burst fracture at L4 with approximately 50% loss of height and minimal retropulsion. This is stable compared to the plain film 6/28/2024.  2.  No other fracture.    XR LUMBAR SPINE 2/3 VIEWS - 6/28/2024    5 lumbar type vertebra. Vertebral body height is normal. There is 4 mm anterolisthesis of L4 on L5. Disc spaces are preserved. Bony demineralization. No radiographic evidence for acute fracture or subluxation    Available labs at time of consult:       Recent Labs   Lab 07/08/24 0533 07/08/24 0049   WBC 6.8 6.8   HGB 11.9 11.9   HCT 35.8 36.1   MCV 94 94    230     Recent Labs   Lab 07/08/24 0533 07/08/24 0049   WBC 6.8 6.8   HGB 11.9 11.9   HCT 35.8 36.1   MCV 94 94    230     No results for input(s): \"SED\", \"CRP\" in the last 168 hours.  Recent Labs   Lab 07/08/24 0533 07/08/24  0049   HGB 11.9 11.9     No results for input(s): \"INR\" in the last 168 hours.  No results for input(s): \"BILINEONATAL\", \"TCBIL\" in the last 168 hours.  Recent Labs   Lab 07/08/24  0533 07/08/24  0049    230     Recent Labs   Lab 07/08/24 0533 07/08/24  0049   WBC 6.8 6.8         Respectfully,    Galileo Clayton, MPAS, PAVÍCTOR  [facility] Neurosurgery  [facility]     Tel: [phone]      All imaging, physical findings, and the above plan have been reviewed with Dr. Ronquillo.    "

## 2024-07-08 NOTE — ED NOTES
Pt changed into green gown without metal in preparation for when MRI is ready for the pt. MRI checklist completed by pt with previous nurse and marked ready for rad by previous nurse. Pt also took off her rings, earring, and necklace; pt items placed into biohazard bag and left in pt's room on bedside table next to the pt.

## 2024-07-08 NOTE — PLAN OF CARE
PRIMARY DIAGNOSIS: ACUTE PAIN  OUTPATIENT/OBSERVATION GOALS TO BE MET BEFORE DISCHARGE:  1. Pain Status: Improved-controlled with oral pain medications.    2. Return to near baseline physical activity: Yes    3. Cleared for discharge by consultants (if involved): No    Discharge Planner Nurse   Safe discharge environment identified: Yes  Barriers to discharge: Yes       Entered by: Farzaneh Muniz RN 07/08/2024 3:43 PM     Please review provider order for any additional goals.   Nurse to notify provider when observation goals have been met and patient is ready for discharge.Goal Outcome Evaluation:

## 2024-07-08 NOTE — PROGRESS NOTES
PRIMARY DIAGNOSIS: ACUTE PAIN  OUTPATIENT/OBSERVATION GOALS TO BE MET BEFORE DISCHARGE:  1. Pain Status: Improved-controlled with oral pain medications.    2. Return to near baseline physical activity: Yes    3. Cleared for discharge by consultants (if involved): No    Discharge Planner Nurse   Safe discharge environment identified: Yes  Barriers to discharge: Yes       Entered by: Sadie Galarza RN 07/08/2024 6:35 PM     Please review provider order for any additional goals.   Nurse to notify provider when observation goals have been met and patient is ready for discharge.

## 2024-07-08 NOTE — PROGRESS NOTES
Updated progress note:       Assessment and Plan:   Barbara Camacho is a 53 year old female admitted on 7/7/2024. She presented to the ER for back pain, as per patient she developed back pain while moving a dresser at end of June 2024. Lumbar CT noting mild to moderate subacute burst fracture L4 with 50% height loss and minimal retropulsion.  ED provider discussed with neurosurgery who recommended admission, MRI of the lumbar spine, bedrest, pain control. Neurosurgery recommending bracing x 3 months, with repeat imaging, OP follow up in 3 weeks. MRI lumbar spine w and w/o contrast pending.     Worsening low back pain  Subacute burst L4 fracture  Presented with back pain which has not improved  -- CT L-spine: mild to moderate subacute burst fracture at L4 with approximately 50% loss of height and minimal retropulsion. This is stable compared to the plain film 6/28/24.  -- Neurosurgery recommendations: MRI lumbar spine. Orthotics teams to fit, supply and provide donning and doffing instructions for TLSO brace - likely needed x 3 months. Upright xray after brace in place. Follow up outpatient for repeat imaging in 3 weeks.   -- MRI w contrast given cancer history -- MRI planned for this evening   -- Neurochecks  -- Continue home methocarbamol   -- Pain control: PO PRN acetaminophen, IV dilaudid, PO oxycodone  -- Fall precautions  -- PT/OT consults  **Patient's ankle monitor has been removed and she is ready to obtain her MRI. Will need to go to [place] immediately after discharge to obtain a new ankle monitor.     Hypothyroidism  -- Continue with levothyroxine    Hx ovarian cancer s/p single oophorectomy (2000?)  Hx thyroid cancer s/p right thyroidectomy (2019)       Diet: Combination Diet Regular Diet Adult    DVT Prophylaxis: Pneumatic Compression Devices  [place] Catheter: PRESENT, indication:    Lines: None     Cardiac Monitoring: None  Code Status: Full Code        HPI:   Spoke with patient at bedside this morning.  "She reports all pain is across the bilateral low back, worsens with movement and weight bearing. Walking was difficult into ED as it was painful since she \"hurt\" her back in June moving a dresser. She thought she just pinched a nerve. Denies interval fever/chills, headache, or dizziness. No CP or SOB. Denies abd pain, N/V. No saddle anesthesia. Denies changes to bowel or bladder habits with no incontinence. No change in numbness or weakness of lower extremities since admittance.       PE:     GENERAL: Well-developed, overweight appearing female in no apparent distress.   EYES: Lids and lashes normal. Pupils equal. No conjunctivitis, injection or icterus.   HENT: Normocephalic, atraumatic. Mucous membranes moist.  RESPIRATORY: CTAB. No increased work of breathing.   CARDIOVASCULAR: RRR, no murmurs gallops or rubs.   ABDOMEN: Bowel sounds present, soft and non-tender to palpation.   EXTREMITIES: No lower extremity edema. Strength 5/5 bilateral lower extremities. Moves extremities spontaneously. Left ankle monitor in place.   NEURO: Alert and oriented x 3. No focal neurologic deficit.   PSYCH: Appropriate mood and affect.       Farrah Sandy PA-C  Hospitalist Service  [facility]  Securely message with [employer] (more info)  Text page via [employer] Paging/Directory   "

## 2024-07-08 NOTE — ED PROVIDER NOTES
Emergency Department Encounter      NAME: Barbara Camacho  AGE: 53 year old female  YOB: 1970  MRN: [id number]  EVALUATION DATE & TIME: 7/7/2024 10:03 PM    PCP: Salbador Ashby    ED PROVIDER: Michael Muller M.D.      Chief Complaint   Patient presents with    Leg Pain    Back Pain         FINAL IMPRESSION:  1. Closed burst fracture of lumbar vertebra, initial encounter (H)        MEDICAL DECISION MAKING:    10:20 PM I met with the patient, obtained history, performed an initial exam, and discussed options and plan for diagnostics and treatment here in the ED.   11:48 PM I rechecked and updated the patient on results.   11:52 PM I spoke with CALOS Nqavi, neurosurgery  12:36 AM I spoke with Dr. Gondal, hospitalist      This patient is a 53-year-old female with history of anxiety and follicular thyroid cancer who presents with back pain.  The patient is on house arrest and is wearing a left ankle bracelet.  She says that on 26 June she hurt her back while moving a dresser.  She was seen on 28 June at the urgency room where a x-ray is done of the lumbar spine and was read as being negative except for a 4 mm anterolisthesis of L4 on 5.  She was prescribed Robaxin and discharged home.  She says that her pain continued and that is why she returned to the ER.  She has some urinary urgency but no urinary incontinence.  No fevers or chills.  No saddle anesthesia.  No numbness or weakness of the legs.  However the pain now radiates to the anterior and lateral thighs.  Because of this I did a lumbar spine CT and it showed a burst fracture of L4 with 50% height loss and minimal retropulsion.  I spoke with Donya with neurosurgery and she recommended admitting the patient, keeping her bedrest, obtain an MRI of the lumbar spine and they will see her later today to arrange for a TLSO brace.  The patient was admitted to the hospital service.      Pertinent Labs & Imaging studies reviewed. (See chart for  details)      Medical Decision Making     History:  Supplemental history from: Documented in chart, if applicable  External Record(s) reviewed: Documented in chart, if applicable.     Work Up:  Chart documentation includes differential considered and any EKGs or imaging independently interpreted by provider, where specified.  In additional to work up documented, I considered the following work up: Documented in chart, if applicable.     External consultation:  Discussion of management with another provider: Documented in chart, if applicable     Complicating factors:  Care impacted by chronic illness: none  Care affected by social determinants of health: Access to Medical Care     Disposition considerations: Admit      MEDICATIONS GIVEN IN THE EMERGENCY:  Medications   lidocaine 1 % 0.1-1 mL (has no administration in time range)   lidocaine (LMX4) cream (has no administration in time range)   sodium chloride (PF) 0.9% PF flush 3 mL (3 mLs Intracatheter $Given 7/8/24 0346)   sodium chloride (PF) 0.9% PF flush 3 mL (has no administration in time range)   senna-docusate (SENOKOT-S/PERICOLACE) 8.6-50 MG per tablet 1 tablet (has no administration in time range)     Or   senna-docusate (SENOKOT-S/PERICOLACE) 8.6-50 MG per tablet 2 tablet (has no administration in time range)   calcium carbonate (TUMS) chewable tablet 1,000 mg (has no administration in time range)   oxyCODONE IR (ROXICODONE) half-tab 2.5 mg (has no administration in time range)   oxyCODONE (ROXICODONE) tablet 5 mg (5 mg Oral $Given 7/8/24 0103)   HYDROmorphone (DILAUDID) injection 0.2 mg (has no administration in time range)   HYDROmorphone (DILAUDID) injection 0.4 mg (has no administration in time range)   ondansetron (ZOFRAN ODT) ODT tab 4 mg (has no administration in time range)     Or   ondansetron (ZOFRAN) injection 4 mg (has no administration in time range)   acetaminophen (TYLENOL) tablet 500 mg (has no administration in time range)   levothyroxine  "(SYNTHROID/LEVOTHROID) tablet 150 mcg (has no administration in time range)   methocarbamol (ROBAXIN) tablet 500 mg (has no administration in time range)       NEW PRESCRIPTIONS STARTED AT TODAY'S ER VISIT:  Current Discharge Medication List             =================================================================    HPI    Patient information was obtained from: the patient     Use of : N/A         Barbara Camacho is a 53 year old female with a past medical history of anxiety, ovarian cancer and migraine, who presents to the ED for evaluation of back pain.     The patient reports an onset of persistent low back pain since 6/26 (11 days ago) when she was helping pull a dresser at work. Her boss was pushing the dresser while she helped pulled. She states feeling \"stuck for a moment.\"     The back pain radiated to the front and sides of her upper legs. Standing makes the leg pain worse. Lying down brings relief to the leg pain but makes the back pain worse. She has been using heating pads and taking tylenol and ibuprofen alternatively. She reports taking 4 tablets of ibuprofen today, her last dose was at 7:00 PM (~3 hours ago). Denies numbness and tingly sensations in legs. Denies flank pain and incontinence. She states having lymphedema at baseline.     The patient reports currently being on house arrest. She has not been taking Levothyroxine for 1.5 weeks because she ran out. She has a history of thyroid cancer, with radiation therapy and surgical removal. She also has a history of ovarian cancer with one side removed over 20 years ago.     The patient denies fever, dysuria, and any other complaints at this time.       REVIEW OF SYSTEMS   Review of Systems   Constitutional:  Negative for fever.   Cardiovascular:  Positive for leg swelling (baseline).   Genitourinary:  Negative for dysuria and flank pain.   Musculoskeletal:  Positive for back pain (low back pain radiating to upper legs).   Neurological:  " Negative for numbness.        Denies tingly sensation.    All other systems reviewed and are negative.       PAST MEDICAL HISTORY:  Past Medical History:   Diagnosis Date    Anemia     Anxiety     Cancer (H)     ovarian cancer at age 26 - treated with right oophorectomy. no chemo/radiation    Follicular carcinoma of thyroid (H) 10/30/2019    Gastroesophageal reflux disease     Substance abuse (H)     Thyroid nodule 2016    bx done at [facility]        PAST SURGICAL HISTORY:  Past Surgical History:   Procedure Laterality Date    APPENDECTOMY OPEN      angeles and ovary removed all at same time as appy    CHOLECYSTECTOMY      FINE NEEDLE ASPIRATION  2019    thyroid    HERNIA REPAIR      LAPAROSCOPIC BYPASS GASTRIC  7/20/2011    Procedure:LAPAROSCOPIC BYPASS GASTRIC; Hiatal hernia repair; Surgeon:FRANKIE VUONG; Location:[facility] OR    OOPHORECTOMY  2000?    THYROIDECTOMY Right 10/21/2019    Procedure: Right Thyroidectomy;  Surgeon: Glenroy Nichols MD;  Location: [facility] OR       CURRENT MEDICATIONS:      Current Facility-Administered Medications:     acetaminophen (TYLENOL) tablet 500 mg, 500 mg, Oral, Q6H PRN, Gondal, Saad J, MD    calcium carbonate (TUMS) chewable tablet 1,000 mg, 1,000 mg, Oral, 4x Daily PRN, Gondal, Saad J, MD    HYDROmorphone (DILAUDID) injection 0.2 mg, 0.2 mg, Intravenous, Q2H PRN, Gondal, Saad J, MD    HYDROmorphone (DILAUDID) injection 0.4 mg, 0.4 mg, Intravenous, Q2H PRN, Gondal, Saad J, MD    levothyroxine (SYNTHROID/LEVOTHROID) tablet 150 mcg, 150 mcg, Oral, Daily, Gondal, Saad J, MD    lidocaine (LMX4) cream, , Topical, Q1H PRN, Gondal, Saad J, MD    lidocaine 1 % 0.1-1 mL, 0.1-1 mL, Other, Q1H PRN, Gondal, Saad J, MD    methocarbamol (ROBAXIN) tablet 500 mg, 500 mg, Oral, TID PRN, Gondal, Saad J, MD    ondansetron (ZOFRAN ODT) ODT tab 4 mg, 4 mg, Oral, Q6H PRN **OR** ondansetron (ZOFRAN) injection 4 mg, 4 mg, Intravenous, Q6H PRN, Gondal, Saad J, MD    oxyCODONE (ROXICODONE) tablet 5 mg, 5  mg, Oral, Q4H PRN, Gondal, Saad J, MD, 5 mg at 07/08/24 0103    oxyCODONE IR (ROXICODONE) half-tab 2.5 mg, 2.5 mg, Oral, Q4H PRN, Gondal, Saad J, MD    senna-docusate (SENOKOT-S/PERICOLACE) 8.6-50 MG per tablet 1 tablet, 1 tablet, Oral, BID PRN **OR** senna-docusate (SENOKOT-S/PERICOLACE) 8.6-50 MG per tablet 2 tablet, 2 tablet, Oral, BID PRN, Gondal, Saad J, MD    sodium chloride (PF) 0.9% PF flush 3 mL, 3 mL, Intracatheter, Q8H, Gondal, Saad J, MD, 3 mL at 07/08/24 0346    sodium chloride (PF) 0.9% PF flush 3 mL, 3 mL, Intracatheter, q1 min prn, Gondal, Saad J, MD    ALLERGIES:  Allergies   Allergen Reactions    Codeine Itching    Tylenol W/Codeine [Acetaminophen-Codeine] Hives       FAMILY HISTORY:  Family History   Problem Relation Age of Onset    Coronary Artery Disease Maternal Grandmother     Hypertension Maternal Grandmother     Unknown/Adopted Maternal Grandmother     Depression Maternal Grandmother     Heart Disease Maternal Grandmother     Stomach Problem Maternal Grandmother     Diabetes Paternal Grandmother     Dementia Paternal Grandmother     Asthma Paternal Grandmother     Rheumatoid Arthritis Mother     Unknown/Adopted Mother     Depression Mother     Myocardial Infarction Father     Hypertension Father     Asthma Father     Depression Father     Heart Disease Father     Back Pain Maternal Grandfather     Substance Abuse Maternal Grandfather     Dementia Maternal Grandfather     Unknown/Adopted Maternal Grandfather     Rheumatoid Arthritis Maternal Aunt     Stomach Cancer Maternal Aunt     No Known Problems Paternal Grandfather     Myocardial Infarction Paternal Aunt     Cerebrovascular Disease Paternal Aunt     Thyroid Disease Other     Migraines Other     Cancer No family hx of        SOCIAL HISTORY:   Social History     Socioeconomic History    Marital status:    Tobacco Use    Smoking status: Every Day     Current packs/day: 0.50     Average packs/day: 0.5 packs/day for 38.5 years (19.3  "ttl pk-yrs)     Types: Cigarettes     Start date: 1986    Smokeless tobacco: Never   Substance and Sexual Activity    Alcohol use: Not Currently    Drug use: Not Currently     Comment: 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 In outpatient treatment.     Sexual activity: Not Currently     Partners: Male     Birth control/protection: None       PHYSICAL EXAM:    Vitals: /65 (BP Location: Left arm, Patient Position: Semi-Roque's, Cuff Size: Adult Regular)   Pulse 58   Temp 97.1  F (36.2  C) (Oral)   Resp 16   Ht 1.626 m (5' 4\")   Wt 99.6 kg (219 lb 9.3 oz)   LMP 06/30/2011   SpO2 96%   BMI 37.69 kg/m     Constitutional: Well developed, well nourished. Comfortable appearing.  HEAD:Normocephalic, atraumatic,   GI: Soft, no tenderness to deep palpation in all quadrants, not distended, no masses.  No hepatosplenomegaly.  Musculoskeletal: Moving all 4 extremities intentionally and without pain. No obvious deformity. 1+ lower legs edema. No calf tenderness. Motor and sensory intact throughout legs.  Back: No CVA tenderness. Midline tenderness at L2 level. Diffuse lumbar paraspinal muscle tenderness.   Skin: Warm, dry, no rash.  Neurologic: Alert & oriented x 3, speech clear, moving all extremities spontaneously   Psychiatric: Affect normal, cooperative.     LAB:  All pertinent labs reviewed and interpreted.  Labs Ordered and Resulted from Time of ED Arrival to Time of ED Departure   CBC WITH PLATELETS - Abnormal       Result Value    WBC Count 6.8      RBC Count 3.86      Hemoglobin 11.9      Hematocrit 36.1      MCV 94      MCH 30.8      MCHC 33.0      RDW 15.6 (*)     Platelet Count 230     BASIC METABOLIC PANEL - Abnormal    Sodium 139      Potassium 3.8      Chloride 103      Carbon Dioxide (CO2) 27      Anion Gap 9      Urea Nitrogen 16.6      Creatinine 1.06 (*)     GFR Estimate 63      Calcium 8.3 (*)     Glucose 85     MAGNESIUM - Normal    Magnesium 2.2     PHOSPHORUS - Normal    " Phosphorus 3.6     GLUCOSE MONITOR NURSING POCT       RADIOLOGY:  CT Lumbar Spine w/o Contrast   Final Result   IMPRESSION:   1.  Mild to moderate subacute burst fracture at L4 with approximately 50% loss of height and minimal retropulsion. This is stable compared to the plain film 6/28/2024.      2.  No other fracture.      MR Lumbar Spine w/o Contrast    (Results Pending)         PROCEDURES:   Procedures       I, Isidoro Jones, am serving as a scribe to document services personally performed by Dr. Michael Muller based on my observation and the provider's statements to me. I, Michael Muller M.D. attest that Isidoro Jones is acting in a scribe capacity, has observed my performance of the services and has documented them in accordance with my direction.      Michael Muller M.D.  Emergency Medicine  [facility] EMERGENCY DEPARTMENT  [address] [place] [id number]  [phone]  Dept: [phone]     Michael Muller MD  07/08/24 0652

## 2024-07-08 NOTE — ED NOTES
Charge Nurse on short stay alerted by writer that the admit note is in for the pt and when transport is available, the pt will be transferred upstairs. Short stay charge verbalizes understanding.

## 2024-07-08 NOTE — H&P
"[facility]    History and Physical - Hospitalist Service       Date of Admission:  7/7/2024    Assessment & Plan    Assessment:  Barbara Camacho is a 53 year old female admitted on 7/7/2024. She presented to the ER for back pain, as per patient she developed back pain while moving a dresser at end of June 2024. Lumbar CT noting mild to moderate subacute burst fracture L4 with 50% height loss and minimal retropulsion.  ED provider discussed with neurosurgery who recommended admission, MRI of the lumbar spine, bedrest, pain control and neurosurgery will see the patient in the a.m., patient is going to be admitted for pain control and neurosurgery evaluation.    Problem list and plan:  Back pain  Burst L4 fracture  Presented with back pain which has not improved  CT scan of the lumbar spine showed L4 fracture  Continue with pain management as per protocol  Neurosurgery consulted who recommended MRI of the lumbar spine, bedrest, pain control and neurosurgery evaluation in a.m.  Continue with neurochecks  Continue with Robaxin  Fall precautions  PT and OT evaluation    History of hypothyroidism  Continue with levothyroxine    DVT PPx  Intermittent pneumatic compression    CODE STATUS  Full code as per discussion with the patient          Diet: Combination Diet Regular Diet Adult    DVT Prophylaxis: Pneumatic Compression Devices  [place] Catheter: Not present  Lines: None     Cardiac Monitoring: None  Code Status: Full Code    Clinically Significant Risk Factors Present on Admission                              # Obesity: Estimated body mass index is 34.33 kg/m  as calculated from the following:    Height as of 6/28/24: 1.626 m (5' 4\").    Weight as of 6/28/24: 90.7 kg (200 lb).              Disposition Plan     Medically Ready for Discharge: Anticipated Today     Saad J. Gondal, MD  Hospitalist Service  [facility]  Securely message with Phonetime (more info)  Text page via [employer] " Ehsan/Directory     ______________________________________________________________________    Chief Complaint   Back pain    History is obtained from the patient    History of Present Illness   Barbara Camacho is a 53 year old female with a past medical history documented below presented to the ER for back pain, as per patient she developed back pain while moving a dresser at end of June 2024.  She stated that she was was seen at , where an XR was done noting 4mm anterolisthesis L4-L5, and she was subsequently discharged with robaxin.  Since then pain has not improved significantly and now also going down into her thighs, because of the above issues she presented to the ER where vitals were fairly within normal limits, labs ordered currently pending results. No lower extremity paresthesias, saddle anesthesia, acute bowel or bladder dysfunction and patient is neurologically intact. Lumbar CT noting mild to moderate subacute burst fracture L4 with 50% height loss and minimal retropulsion.  ED provider discussed with neurosurgery who recommended admission, MRI of the lumbar spine, bedrest, pain control and neurosurgery will see the patient in the a.m., patient is going to be admitted for pain control and neurosurgery evaluation.      Past Medical History    Past Medical History:   Diagnosis Date    Anemia     Anxiety     Cancer (H)     ovarian cancer at age 26 - treated with right oophorectomy. no chemo/radiation    Follicular carcinoma of thyroid (H) 10/30/2019    Gastroesophageal reflux disease     Substance abuse (H)     Thyroid nodule 2016    bx done at [facility]        Past Surgical History   Past Surgical History:   Procedure Laterality Date    APPENDECTOMY OPEN      angeles and ovary removed all at same time as appy    CHOLECYSTECTOMY      FINE NEEDLE ASPIRATION  2019    thyroid    HERNIA REPAIR      LAPAROSCOPIC BYPASS GASTRIC  7/20/2011    Procedure:LAPAROSCOPIC BYPASS GASTRIC; Hiatal hernia repair;  Surgeon:FRANKIE VUONG; Location:[facility] OR    OOPHORECTOMY  2000?    THYROIDECTOMY Right 10/21/2019    Procedure: Right Thyroidectomy;  Surgeon: Glenroy Nichols MD;  Location: [facility] OR       Prior to Admission Medications   Prior to Admission Medications   Prescriptions Last Dose Informant Patient Reported? Taking?   Multiple Vitamins-Minerals (TAB-A-DAISY) TABS 7/7/2024 at am Self Yes Yes   Sig: Take 1 tablet by mouth daily   acetaminophen (TYLENOL) 500 MG tablet Unknown at prn Self Yes Yes   Sig: Take 500-1,000 mg by mouth every 6 hours as needed for mild pain   ibuprofen (ADVIL/MOTRIN) 200 MG tablet Unknown at prn Self Yes Yes   Sig: Take 800 mg by mouth every 8 hours as needed for pain   levothyroxine (SYNTHROID/LEVOTHROID) 150 MCG tablet 7/4/2024 at am Self Yes Yes   Sig: Take 150 mcg by mouth daily   methocarbamol (ROBAXIN) 500 MG tablet Unknown at prn Self No Yes   Sig: Take 1-2 tablets (500-1,000 mg) by mouth 3 times daily as needed      Facility-Administered Medications: None        Review of Systems    The 10 point Review of Systems is negative other than noted in the HPI or here.  Back pain    Physical Exam   Vital Signs: Temp: 97.7  F (36.5  C) Temp src: Oral BP: 119/80 Pulse: 67   Resp: 18 SpO2: 96 % O2 Device: None (Room air)    Weight: 0 lbs 0 oz    GENERAL: Patient was seen and examined at bedside with no acute distress  HENT:  Head is normocephalic, atraumatic.   EYES:  Eyes have anicteric sclerae without conjunctival injection   LUNGS: Bilateral air entry, clear to auscultation bilaterally  CARDIOVASCULAR:  Regular rate and rhythm with no murmurs, gallops or rubs.  ABDOMEN:  Normal bowel sounds. Soft; nontender   EXT: no edema    SKIN:  No acute rashes.   NEUROLOGIC:  Grossly nonfocal.      Medical Decision Making       60 MINUTES SPENT BY ME on the date of service doing chart review, history, exam, documentation & further activities per the note.      Data     I have personally reviewed the following  data over the past 24 hrs:    6.8  \   11.9   / 230     N/A N/A N/A /  N/A   N/A N/A N/A \       Imaging results reviewed over the past 24 hrs:   Recent Results (from the past 24 hour(s))   CT Lumbar Spine w/o Contrast    Narrative    EXAM: CT LUMBAR SPINE W/O CONTRAST  LOCATION: [facility]  DATE: 7/7/2024    INDICATION: Pain at L2 level since moving a dresser 6/26 now rad to ant lat thighs.  COMPARISON: Prior lumbar plain films 6/28/2024  TECHNIQUE: Routine CT Lumbar Spine without IV contrast. Multiplanar reformats. Dose reduction techniques were used.     FINDINGS:  VERTEBRA: 5 lumbar-type vertebral bodies. Mild to moderate stable subacute burst fracture at L5 with approximately 50% loss of height. Minimal retropulsion. No other fracture. Disc space heights are preserved. Mild lower lumbar facet arthropathy.    CANAL/FORAMINA: No high-grade canal or foraminal narrowing.    PARASPINAL: No extraspinal abnormality.      Impression    IMPRESSION:  1.  Mild to moderate subacute burst fracture at L4 with approximately 50% loss of height and minimal retropulsion. This is stable compared to the plain film 6/28/2024.    2.  No other fracture.

## 2024-07-08 NOTE — ED TRIAGE NOTES
Triage Assessment (Adult)       Row Name 07/07/24 2209          Triage Assessment    Airway WDL WDL        Cognitive/Neuro/Behavioral WDL    Cognitive/Neuro/Behavioral WDL WDL

## 2024-07-08 NOTE — ED TRIAGE NOTES
The patient reports that she hurt her back and right leg at work while moving a dresser. This happed on 6/27. She reports that she was seen for her injury and told that she had pulled a muscle. She returns tonight because her pain has not improved.

## 2024-07-08 NOTE — TELEPHONE ENCOUNTER
Patients mother left vm on surgery scheduling line regarding scheduling surgery for patient. Keysha states that she has had ultrasound and endocrinology appt.   Routed to DRAGAN Alcaraz for follow up.       
Female

## 2024-07-09 ENCOUNTER — APPOINTMENT (OUTPATIENT)
Dept: PHYSICAL THERAPY | Facility: HOSPITAL | Age: 54
End: 2024-07-09
Attending: STUDENT IN AN ORGANIZED HEALTH CARE EDUCATION/TRAINING PROGRAM
Payer: OTHER MISCELLANEOUS

## 2024-07-09 VITALS
SYSTOLIC BLOOD PRESSURE: 112 MMHG | RESPIRATION RATE: 16 BRPM | HEIGHT: 64 IN | WEIGHT: 219.58 LBS | BODY MASS INDEX: 37.49 KG/M2 | OXYGEN SATURATION: 96 % | DIASTOLIC BLOOD PRESSURE: 72 MMHG | TEMPERATURE: 98.1 F | HEART RATE: 59 BPM

## 2024-07-09 LAB
ALBUMIN SERPL BCG-MCNC: 3.7 G/DL (ref 3.5–5.2)
ALP SERPL-CCNC: 85 U/L (ref 40–150)
ALT SERPL W P-5'-P-CCNC: 119 U/L (ref 0–50)
ANION GAP SERPL CALCULATED.3IONS-SCNC: 9 MMOL/L (ref 7–15)
AST SERPL W P-5'-P-CCNC: 83 U/L (ref 0–45)
BILIRUB SERPL-MCNC: 0.3 MG/DL
BUN SERPL-MCNC: 15.3 MG/DL (ref 6–20)
CALCIUM SERPL-MCNC: 7.7 MG/DL (ref 8.6–10)
CHLORIDE SERPL-SCNC: 103 MMOL/L (ref 98–107)
CREAT SERPL-MCNC: 0.84 MG/DL (ref 0.51–0.95)
DEPRECATED HCO3 PLAS-SCNC: 27 MMOL/L (ref 22–29)
EGFRCR SERPLBLD CKD-EPI 2021: 83 ML/MIN/1.73M2
GLUCOSE BLDC GLUCOMTR-MCNC: 81 MG/DL (ref 70–99)
GLUCOSE SERPL-MCNC: 78 MG/DL (ref 70–99)
HOLD SPECIMEN: NORMAL
MAGNESIUM SERPL-MCNC: 2 MG/DL (ref 1.7–2.3)
PHOSPHATE SERPL-MCNC: 3.4 MG/DL (ref 2.5–4.5)
POTASSIUM SERPL-SCNC: 4.2 MMOL/L (ref 3.4–5.3)
PROT SERPL-MCNC: 6 G/DL (ref 6.4–8.3)
SODIUM SERPL-SCNC: 139 MMOL/L (ref 135–145)

## 2024-07-09 PROCEDURE — 36415 COLL VENOUS BLD VENIPUNCTURE: CPT | Performed by: INTERNAL MEDICINE

## 2024-07-09 PROCEDURE — 97530 THERAPEUTIC ACTIVITIES: CPT | Mod: GP

## 2024-07-09 PROCEDURE — 250N000013 HC RX MED GY IP 250 OP 250 PS 637: Performed by: STUDENT IN AN ORGANIZED HEALTH CARE EDUCATION/TRAINING PROGRAM

## 2024-07-09 PROCEDURE — 258N000003 HC RX IP 258 OP 636: Performed by: INTERNAL MEDICINE

## 2024-07-09 PROCEDURE — 80053 COMPREHEN METABOLIC PANEL: CPT | Performed by: INTERNAL MEDICINE

## 2024-07-09 PROCEDURE — 83735 ASSAY OF MAGNESIUM: CPT | Performed by: STUDENT IN AN ORGANIZED HEALTH CARE EDUCATION/TRAINING PROGRAM

## 2024-07-09 PROCEDURE — G0378 HOSPITAL OBSERVATION PER HR: HCPCS

## 2024-07-09 PROCEDURE — 82962 GLUCOSE BLOOD TEST: CPT

## 2024-07-09 PROCEDURE — 99213 OFFICE O/P EST LOW 20 MIN: CPT | Performed by: PHYSICIAN ASSISTANT

## 2024-07-09 PROCEDURE — 97161 PT EVAL LOW COMPLEX 20 MIN: CPT | Mod: GP

## 2024-07-09 PROCEDURE — 99207 PR APP CREDIT; MD BILLING SHARED VISIT: CPT

## 2024-07-09 PROCEDURE — 99239 HOSP IP/OBS DSCHRG MGMT >30: CPT | Performed by: FAMILY MEDICINE

## 2024-07-09 PROCEDURE — 84100 ASSAY OF PHOSPHORUS: CPT | Performed by: STUDENT IN AN ORGANIZED HEALTH CARE EDUCATION/TRAINING PROGRAM

## 2024-07-09 RX ORDER — NALOXONE HYDROCHLORIDE 0.4 MG/ML
0.2 INJECTION, SOLUTION INTRAMUSCULAR; INTRAVENOUS; SUBCUTANEOUS
Status: DISCONTINUED | OUTPATIENT
Start: 2024-07-09 | End: 2024-07-09 | Stop reason: HOSPADM

## 2024-07-09 RX ORDER — NALOXONE HYDROCHLORIDE 0.4 MG/ML
0.4 INJECTION, SOLUTION INTRAMUSCULAR; INTRAVENOUS; SUBCUTANEOUS
Status: DISCONTINUED | OUTPATIENT
Start: 2024-07-09 | End: 2024-07-09 | Stop reason: HOSPADM

## 2024-07-09 RX ORDER — HYDROMORPHONE HCL IN WATER/PF 6 MG/30 ML
0.2 PATIENT CONTROLLED ANALGESIA SYRINGE INTRAVENOUS EVERY 4 HOURS PRN
Status: DISCONTINUED | OUTPATIENT
Start: 2024-07-09 | End: 2024-07-09

## 2024-07-09 RX ORDER — IBUPROFEN 200 MG
400 TABLET ORAL EVERY 8 HOURS PRN
COMMUNITY
Start: 2024-07-09 | End: 2024-09-09

## 2024-07-09 RX ORDER — METHOCARBAMOL 500 MG/1
500 TABLET, FILM COATED ORAL 3 TIMES DAILY PRN
Status: SHIPPED
Start: 2024-07-09

## 2024-07-09 RX ORDER — OXYCODONE HYDROCHLORIDE 5 MG/1
2.5 TABLET ORAL EVERY 6 HOURS PRN
Qty: 12 TABLET | Refills: 0 | Status: SHIPPED | OUTPATIENT
Start: 2024-07-09 | End: 2024-09-15

## 2024-07-09 RX ORDER — LEVOTHYROXINE SODIUM 150 UG/1
150 TABLET ORAL DAILY
Qty: 30 TABLET | Refills: 1 | Status: SHIPPED | OUTPATIENT
Start: 2024-07-09 | End: 2024-09-09

## 2024-07-09 RX ORDER — ACETAMINOPHEN 500 MG
500 TABLET ORAL EVERY 6 HOURS
COMMUNITY
Start: 2024-07-09 | End: 2024-09-09

## 2024-07-09 RX ADMIN — ACETAMINOPHEN 500 MG: 500 TABLET ORAL at 12:32

## 2024-07-09 RX ADMIN — OXYCODONE HYDROCHLORIDE 5 MG: 5 TABLET ORAL at 01:14

## 2024-07-09 RX ADMIN — SODIUM CHLORIDE, PRESERVATIVE FREE: 5 INJECTION INTRAVENOUS at 03:57

## 2024-07-09 RX ADMIN — LEVOTHYROXINE SODIUM 150 MCG: 0.03 TABLET ORAL at 08:40

## 2024-07-09 RX ADMIN — OXYCODONE HYDROCHLORIDE 5 MG: 5 TABLET ORAL at 08:40

## 2024-07-09 ASSESSMENT — ACTIVITIES OF DAILY LIVING (ADL)
ADLS_ACUITY_SCORE: 20
ADLS_ACUITY_SCORE: 20
ADLS_ACUITY_SCORE: 25
ADLS_ACUITY_SCORE: 20
ADLS_ACUITY_SCORE: 20
ADLS_ACUITY_SCORE: 25
ADLS_ACUITY_SCORE: 21
ADLS_ACUITY_SCORE: 24
ADLS_ACUITY_SCORE: 21
ADLS_ACUITY_SCORE: 20
ADLS_ACUITY_SCORE: 25
ADLS_ACUITY_SCORE: 20
ADLS_ACUITY_SCORE: 21

## 2024-07-09 NOTE — PLAN OF CARE
Problem: Adult Inpatient Plan of Care  Goal: Optimal Comfort and Wellbeing  Intervention: Monitor Pain and Promote Comfort  Recent Flowsheet Documentation  Taken 7/9/2024 0439 by Romy Thomas RN  Pain Management Interventions: rest  Taken 7/9/2024 0159 by Romy Thomas RN  Pain Management Interventions: rest  Taken 7/9/2024 0113 by Romy Thomas RN  Pain Management Interventions: medication (see MAR)     Problem: Pain Acute  Goal: Optimal Pain Control and Function  Outcome: Progressing  Intervention: Develop Pain Management Plan  Recent Flowsheet Documentation  Taken 7/9/2024 0439 by Romy Thomas RN  Pain Management Interventions: rest  Taken 7/9/2024 0159 by Romy Thomas RN  Pain Management Interventions: rest  Taken 7/9/2024 0113 by Romy Thomas RN  Pain Management Interventions: medication (see MAR)  Intervention: Prevent or Manage Pain  Recent Flowsheet Documentation  Taken 7/9/2024 0440 by Romy Thomas RN  Medication Review/Management: medications reviewed  Taken 7/9/2024 0102 by Romy Thomas RN  Medication Review/Management: medications reviewed     Problem: Adult Inpatient Plan of Care  Goal: Absence of Hospital-Acquired Illness or Injury  Intervention: Prevent Skin Injury  Recent Flowsheet Documentation  Taken 7/9/2024 0440 by Romy Thomas RN  Body Position: position changed independently  Taken 7/9/2024 0102 by Romy Thomas RN  Body Position: position changed independently   Goal Outcome Evaluation:       Patient admitted for back pain. Found out to have closed burst fracture of lumbar vertebra. Oxy administered around 0115 for pain. AOX4. RA. A1 to the commode. Waiting for brace.  ml/hr. Regular diet. PT/OT consult. Slept well.

## 2024-07-09 NOTE — PLAN OF CARE
PRIMARY DIAGNOSIS: ACUTE PAIN  OUTPATIENT/OBSERVATION GOALS TO BE MET BEFORE DISCHARGE:  1. Pain Status: Improved-controlled with oral pain medications.    2. Return to near baseline physical activity: Yes    3. Cleared for discharge by consultants (if involved): Yes    Discharge Planner Nurse   Safe discharge environment identified: Yes  Barriers to discharge: Yes       Entered by: Romy Thomas RN 07/09/2024 1:52 AM     Please review provider order for any additional goals.   Nurse to notify provider when observation goals have been met and patient is ready for discharge.Goal Outcome Evaluation:

## 2024-07-09 NOTE — PROGRESS NOTES
PRIMARY DIAGNOSIS: ACUTE PAIN  OUTPATIENT/OBSERVATION GOALS TO BE MET BEFORE DISCHARGE:  1. Pain Status: Improved-controlled with oral pain medications.    2. Return to near baseline physical activity: Yes    3. Cleared for discharge by consultants (if involved): No    Discharge Planner Nurse   Safe discharge environment identified: Yes  Barriers to discharge: Yes       Entered by: Sadie Galarza RN 07/08/2024 9:22 PM     Please review provider order for any additional goals.   Nurse to notify provider when observation goals have been met and patient is ready for discharge.

## 2024-07-09 NOTE — PLAN OF CARE
Physical Therapy Discharge Summary    Reason for therapy discharge:    All goals and outcomes met, no further needs identified.    Progress towards therapy goal(s). See goals on Care Plan in [facility] electronic health record for goal details.  Goals met    Therapy recommendation(s):    No further therapy is recommended.

## 2024-07-09 NOTE — PLAN OF CARE
"PRIMARY DIAGNOSIS: \"GENERIC\" NURSING  OUTPATIENT/OBSERVATION GOALS TO BE MET BEFORE DISCHARGE:  ADLs back to baseline: Yes    Activity and level of assistance: Ambulating independently.    Pain status: Improved-controlled with oral pain medications.    Return to near baseline physical activity: Yes     Discharge Planner Nurse   Safe discharge environment identified: Yes  Barriers to discharge: NO       Entered by: Kailash Nogueira RN 07/09/2024 1:29 PM  Pt A&O X 4 C/O pain 5 mg oxy was given with relief. Had breakfast went out for a walk with family. C/O pain 500 mg Tylenol was given. Pt is going to discharge.   "

## 2024-07-09 NOTE — PROGRESS NOTES
At 1100, writer noticed that patient was no longer in room, pt was not found on the unit. Personal belongings including cell phone, shoes, and personal belongings bag were left in the room. Provider notified and aware. Pt returned to room at 1130.

## 2024-07-09 NOTE — PLAN OF CARE
Problem: Adult Inpatient Plan of Care  Goal: Optimal Comfort and Wellbeing  Intervention: Monitor Pain and Promote Comfort  Recent Flowsheet Documentation  Taken 7/8/2024 1954 by Sadie Galarza RN  Pain Management Interventions: medication (see MAR)   Goal Outcome Evaluation:       PRN Oxycodone given for lower back pain with improvement. Pt fitted with TLSO brace. Xray and MRI done. UA for drug screen sent. IVF infusing.

## 2024-07-09 NOTE — DISCHARGE SUMMARY
"[facility]  Hospitalist Discharge Summary      Date of Admission:  7/7/2024  Date of Discharge:  7/9/2024  Discharging Provider: Farrah Sandy PA-C  Discharge Service: Hospitalist Service    Discharge Diagnoses   Acute vs subacute L5 burst fracture - TLSO brace required  Hypothyroidism    Clinically Significant Risk Factors     # Obesity: Estimated body mass index is 37.69 kg/m  as calculated from the following:    Height as of this encounter: 1.626 m (5' 4\").    Weight as of this encounter: 99.6 kg (219 lb 9.3 oz).       Follow-ups Needed After Discharge   Follow-up Appointments     Follow-up and recommended labs and tests       Your Neurosurgical follow-up appointments have been scheduled. You may   call [phone] to make, confirm or change your appointment dates and/or   times.        Follow-up and recommended labs and tests       Follow up with primary care provider, GUME HERNÁNDEZ, within 14 days to   evaluate medication change, to evaluate treatment change, and for hospital   follow- up.  The following labs/tests are recommended: CMP, CBC.  It is   recommended that you contact your PCP and ask for a hospital follow-up -   abnormal LFTs during hospital stay - were improving, but hospital team   wants follow-up 2 weeks later for repeat labs.            Discharge Disposition   Discharged to home  Condition at discharge: Good    Hospital Course   Barbara Camacho is a 53 year old female admitted on 7/7/2024. She presented to the ER for back pain, as per patient she developed back pain while moving a dresser at end of June 2024. Lumbar CT noting mild to moderate subacute burst fracture L4 with 50% height loss and minimal retropulsion.  ED provider discussed with neurosurgery who recommended admission, MRI of the lumbar spine, bedrest, pain control. Neurosurgery recommending bracing x 3 months, with repeat imaging, OP follow up in 3 weeks. MRI lumbar spine w and w/o contrast pending.    "   Worsening low back pain  Acute burst L5 fracture  Presented with back pain which has not improved  -- CT L-spine: mild to moderate subacute burst fracture at L4 with approximately 50% loss of height and minimal retropulsion. This is stable compared to the plain film 6/28/24.  -- MRI L-spine: recent-appearing acute or subacute burst-type compression fracture at L5 level with approximate 40% height loss. No definite underlying lesion identified to suggest pathologic fracture.   -- Neurosurgery recommendations: Orthotics teams to fit, supply and provide donning and doffing instructions for TLSO brace - likely needed x 3 months. Upright xray after brace in place. Follow up outpatient for repeat imaging in 3 weeks.   -- Neurochecks  -- Continue home methocarbamol   -- Pain control: PO PRN acetaminophen, PO oxycodone - discharged with PO oxycodone PRN and scheduled PO tylenol   -- Fall precautions  -- PT/OT consults - recommend home with assist   **Patient's ankle monitor has been removed and she is ready to obtain her MRI. Will need to go to [place] immediately after discharge to obtain a new ankle monitor.      Hypothyroidism  -- Continue with levothyroxine     Hx ovarian cancer s/p single oophorectomy (2000?)  Hx thyroid cancer s/p right thyroidectomy (2019)        Diet: Combination Diet Regular Diet Adult    DVT Prophylaxis: Pneumatic Compression Devices  [place] Catheter: Absent:    Lines: None     Cardiac Monitoring: None  Code Status: Full Code      Consultations This Hospital Stay   NEUROSURGERY IP CONSULT  PHYSICAL THERAPY ADULT IP CONSULT  OCCUPATIONAL THERAPY ADULT IP CONSULT    Code Status   Full Code    Time Spent on this Encounter   IFarrah PA-C, personally saw the patient today and spent greater than 30 minutes discharging this patient.       Farrah Sandy PA-C  [facility]TEND[facility]ORT STAY  [address] [place] [id number]  Phone:  [phone]  Fax: [phone]  ______________________________________________________________________    Physical Exam   Vital Signs: Temp: 98.1  F (36.7  C) Temp src: Oral BP: 112/72 Pulse: 59   Resp: 16 SpO2: 96 % O2 Device: None (Room air)    Weight: 219 lbs 9.25 oz       Primary Care Physician   GUME HERNÁNDEZ    Discharge Orders      Reason for your hospital stay    Acute L5 superior endplate compression fracture with 50% height loss.     Follow-up and recommended labs and tests     Your Neurosurgical follow-up appointments have been scheduled. You may call [phone] to make, confirm or change your appointment dates and/or times.     Activity    Please wear your brace at all times when out of bed. You may shower with the brace off. Please limit your lifting to no more that ten pounds and avoid excessive bending, twisting and turning at the lumbar spine. You should also avoid excessive jostling and jarring activities.     Follow-up and recommended labs and tests     Follow up with primary care provider, GUME HERNÁNDEZ, within 14 days to evaluate medication change, to evaluate treatment change, and for hospital follow- up.  The following labs/tests are recommended: CMP, CBC.  It is recommended that you contact your PCP and ask for a hospital follow-up - abnormal LFTs during hospital stay - were improving, but hospital team wants follow-up 2 weeks later for repeat labs.     Reason for your hospital stay    Acute L5 burst fracture  Elevated LFTs (improving)  Dehydration     When to contact your care team    RETURN TO ED should you notice severe worsening pain despite wearing brace, sudden onset numbness or weakness of unilateral or bilateral lower extremities, change in bowel or bladder habits including new incontinence, saddle anesthesia or fever/chills associated with the aforementioned symptoms.     Diet    Follow this diet upon discharge: Orders Placed This Encounter      Combination Diet Regular Diet  Adult       Significant Results and Procedures   Most Recent 3 CBC's:  Recent Labs   Lab Test 07/08/24  0533 07/08/24  0049 08/21/20  1027   WBC 6.8 6.8 7.4   HGB 11.9 11.9 11.2*   MCV 94 94 79    230 308     Most Recent 3 BMP's:  Recent Labs   Lab Test 07/09/24  0720 07/09/24  0648 07/08/24 2114 07/08/24 0722 07/08/24 0533 07/08/24  0049   NA  --  139  --   --  143 139   POTASSIUM  --  4.2  --   --  3.8 3.8   CHLORIDE  --  103  --   --  106 103   CO2  --  27  --   --  28 27   BUN  --  15.3  --   --  17.9 16.6   CR  --  0.84  --   --  1.10* 1.06*   ANIONGAP  --  9  --   --  9 9   KAVITA  --  7.7*  --   --  8.3* 8.3*   GLC 81 78 116*   < > 80 85    < > = values in this interval not displayed.     Most Recent 3 Creatinines:  Recent Labs   Lab Test 07/09/24  0648 07/08/24 0533 07/08/24 0049   CR 0.84 1.10* 1.06*     Most Recent Urinalysis:No lab results found.,   Results for orders placed or performed during the hospital encounter of 07/07/24   CT Lumbar Spine w/o Contrast    Narrative    EXAM: CT LUMBAR SPINE W/O CONTRAST  LOCATION: [facility]  DATE: 7/7/2024    INDICATION: Pain at L2 level since moving a dresser 6/26 now rad to ant lat thighs.  COMPARISON: Prior lumbar plain films 6/28/2024  TECHNIQUE: Routine CT Lumbar Spine without IV contrast. Multiplanar reformats. Dose reduction techniques were used.     FINDINGS:  VERTEBRA: 5 lumbar-type vertebral bodies. Mild to moderate stable subacute burst fracture at L5 with approximately 50% loss of height. Minimal retropulsion. No other fracture. Disc space heights are preserved. Mild lower lumbar facet arthropathy.    CANAL/FORAMINA: No high-grade canal or foraminal narrowing.    PARASPINAL: No extraspinal abnormality.      Impression    IMPRESSION:  1.  Mild to moderate subacute burst fracture at L4 with approximately 50% loss of height and minimal retropulsion. This is stable compared to the plain film 6/28/2024.    2.  No other  fracture.   MR Lumbar Spine w/o & w Contrast    Narrative    EXAM: MR LUMBAR SPINE W/O and W CONTRAST  LOCATION: [facility]  DATE: 7/8/2024    INDICATION: L4 burst fracture, h/o thyroid and ovarian cancers, elevated liver enzymes; Low back pain; r/o cancer. No known automatically detected potential contraindications to imaging.  COMPARISON: CT 7/7/2024.  CONTRAST: 10 mL Gadavist.  TECHNIQUE: Routine Lumbar Spine MRI without and with IV contrast.    FINDINGS:   Nomenclature is based on 5 lumbar type vertebral bodies. As demonstrated on the recent CT scan, there is a burst-type compression deformity at the L5 level with approximate 40% maximal height loss. There is bone marrow edema diffusely in the L5 vertebral   body with a similar enhancement pattern. Epidural enhancement posterior to the L4, L5 and S1 vertebral bodies is consistent with venous engorgement. Mild prevertebral edema is noted. Minimal posterior displacement of the posterior cortex into the   ventral epidural space. Additional lumbar vertebra are normal in height and alignment. Underlying bone marrow pattern is normal. Normal distal spinal cord and cauda equina with conus medullaris at L2. No extraspinal abnormality. Unremarkable visualized   bony pelvis.    Minimal scattered degenerative changes elsewhere without central canal or significant foraminal stenosis.       Impression    IMPRESSION:  1.  Recent-appearing (acute or subacute) burst-type compression fracture at the L5 level with approximate 40% height loss. No definite underlying lesion is identified to suggest pathologic fracture. A follow-up study following resolution of patient's   pain and vertebral body edema could be considered if there is a persistent concern for underlying lesion.  2.  The MRI of the lumbar spine is otherwise unremarkable.   XR Lumbar Spine 2/3 Views    Narrative    EXAM: XR LUMBAR SPINE 2/3 VIEWS  LOCATION: [facility]  HOS[facility]  DATE: 7/8/2024    INDICATION: L4 fracture   please obtain upright x rays with the brace on prior to discharge.  COMPARISON: MRI lumbar spine 7/8/2024 and CT lumbar spine 7/7/2024      Impression    IMPRESSION: Acute L5 superior endplate compression fracture with 50% height loss is similar priors. Remaining vertebral body heights are maintained. Stepwise grade 1 retrolisthesis of L1 on L2 and L2 on L3. Mild multilevel disc height loss. Aortic   atherosclerosis is present.       Discharge Medications   Current Discharge Medication List        START taking these medications    Details   oxyCODONE (ROXICODONE) 5 MG tablet Take 0.5 tablets (2.5 mg) by mouth every 6 hours as needed for severe pain  Qty: 12 tablet, Refills: 0    Associated Diagnoses: Closed burst fracture of lumbar vertebra, initial encounter (H)           CONTINUE these medications which have CHANGED    Details   acetaminophen (TYLENOL) 500 MG tablet Take 1 tablet (500 mg) by mouth every 6 hours    Associated Diagnoses: Closed burst fracture of lumbar vertebra, initial encounter (H)      ibuprofen (ADVIL/MOTRIN) 200 MG tablet Take 2 tablets (400 mg) by mouth every 8 hours as needed for pain    Associated Diagnoses: Closed burst fracture of lumbar vertebra, initial encounter (H)      levothyroxine (SYNTHROID/LEVOTHROID) 150 MCG tablet Take 1 tablet (150 mcg) by mouth daily  Qty: 30 tablet, Refills: 1    Associated Diagnoses: Follicular carcinoma of thyroid (H)      methocarbamol (ROBAXIN) 500 MG tablet Take 1 tablet (500 mg) by mouth 3 times daily as needed for muscle spasms    Associated Diagnoses: Closed burst fracture of lumbar vertebra, initial encounter (H)           CONTINUE these medications which have NOT CHANGED    Details   Multiple Vitamins-Minerals (TAB-A-DAISY) TABS Take 1 tablet by mouth daily           Allergies   Allergies   Allergen Reactions    Codeine Itching    Tylenol W/Codeine [Acetaminophen-Codeine] Hives

## 2024-07-09 NOTE — PROGRESS NOTES
"Neurosurgery Progress     Dx:     Acute L5 superior endplate compression fracture with 50% height loss.    Neuro stable.     TODAY'S PLAN:     -Brace has been delivered and xr's have been obtained.  -May discharge from our perspective.   -Follow-ups with us have been made.   -discharge navigator has been updated with our recommendations.   -Advance activity as tolerated.  -Continue supportive and symptomatic treatment.  -Pain control measures.  -Advance diet as tolerated.  -We will sign off.     In the event that patient's symptoms worsen or change we would appreciate being contacted. We did discuss signs of a worsening problem that she should seek being evaluated.     We did review the above information with the patient whom agrees with the plan and did verbalize understanding.   ________________________________________________________________     Ms. Camacho overall feels well and denies any significant discomfort. Tolerating regular diet without n/v. Up ambulating. Voiding without difficulty.     /72 (BP Location: Left arm)   Pulse 59   Temp 98.1  F (36.7  C) (Oral)   Resp 16   Ht 1.626 m (5' 4\")   Wt 99.6 kg (219 lb 9.3 oz)   LMP 06/30/2011   SpO2 96%   BMI 37.69 kg/m       Pt in bed. Appears comfortable and in no apparent distress, moving all extremities.   CN II-XII intact, alert and appropriate with conversation and following commands.   Bilateral upper and lower extremities with appropriate strength. DTR's WNL.   Spine is non tender to palpation throughout. Winn's and Babinski sign neg.   Sensation intact throughout. Acceptable ROM.   Calves soft and non-tender bilaterally.     All pertinent labs and updated imaging reviewed in EPIC.     XR LUMBAR SPINE 2/3 VIEWS - 7/8/2024     Acute L5 superior endplate compression fracture with 50% height loss is similar priors. Remaining vertebral body heights are maintained. Stepwise grade 1 retrolisthesis of L1 on L2 and L2 on L3. Mild multilevel disc " height loss. Aortic   atherosclerosis is present.    Galileo Clayton PA-C   Neurosurgery

## 2024-07-09 NOTE — PROGRESS NOTES
"   07/09/24 1000   Appointment Info   Signing Clinician's Name / Credentials (PT) Alisia Moura PT, DPT   Quick Adds   Quick Adds Certification   Living Environment   People in Home parent(s);child(domonique), adult  (mother & son)   Current Living Arrangements house   Home Accessibility stairs to enter home   Number of Stairs, Main Entrance 2   Stair Railings, Main Entrance none   Transportation Anticipated family or friend will provide   Living Environment Comments Reports she will be getting assistance with laundry (in basement).   Self-Care   Current Activity Tolerance good   Equipment Currently Used at Home none   Fall history within last six months no   Activity/Exercise/Self-Care Comment tub shower. no grab bars.   General Information   Onset of Illness/Injury or Date of Surgery 07/09/24   Referring Physician Gondal, Saad J, MD   Patient/Family Therapy Goals Statement (PT) go home.   Pertinent History of Current Problem (include personal factors and/or comorbidities that impact the POC) Per chart: \" 53 year old female admitted on 7/7/2024. She presented to the ER for back pain, as per patient she developed back pain while moving a dresser at end of June 2024. Lumbar CT noting mild to moderate subacute burst fracture L4 with 50% height loss and minimal retropulsion.  ED provider discussed with neurosurgery who recommended admission, MRI of the lumbar spine, bedrest, pain control. Neurosurgery recommending bracing x 3 months, with repeat imaging, OP follow up in 3 weeks. MRI lumbar spine w and w/o contrast pending.\"   Existing Precautions/Restrictions brace worn when out of bed;spinal   General Observations Activity order: Up ad gina   Cognition   Affect/Mental Status (Cognition) WNL   Orientation Status (Cognition) oriented x 4   Follows Commands (Cognition) WNL   Pain Assessment   Patient Currently in Pain No   Range of Motion (ROM)   Range of Motion ROM is WNL   Strength (Manual Muscle Testing)   Strength (Manual " Muscle Testing) strength is WNL   Bed Mobility   Bed Mobility no deficits identified;supine-sit   Bed Mobility Limitations impaired ability to control trunk for mobility   Impairments Contributing to Impaired Bed Mobility other (see comments)  (spinal precautions)   Assistive Device (Bed Mobility) bed rails   Comment, (Bed Mobility) cues for log roll   Transfers   Transfers sit-stand transfer   Impairments Contributing to Impaired Transfers other (see comments)  (spinal precautions)   Sit-Stand Transfer   Sit-Stand [place] (Transfers) supervision   Gait/Stairs (Locomotion)   [place] Level (Gait) supervision   Distance in Feet (Gait) 200   Pattern (Gait) swing-through   Negotiation (Stairs) number of steps;handrail location;ascending technique;descending technique   Handrail Location (Stairs) left side (ascending);right side (descending)   Number of Steps (Stairs) 2   Ascending Technique (Stairs) step-to-step   Descending Technique (Stairs) step-to-step   Comment, (Gait/Stairs) steady on feet, no LOB. 30' with FWW initially, progressed to no AD which is patient's baseline.   Balance   Balance no deficits were identified   Clinical Impression   Criteria for Skilled Therapeutic Intervention Yes, treatment indicated   PT Diagnosis (PT) Impaired functional mobility   Influenced by the following impairments spinal precautions   Functional limitations due to impairments bed mobility and transfers   Clinical Presentation (PT Evaluation Complexity) stable   Clinical Presentation Rationale clinical judgment   Clinical Decision Making (Complexity) low complexity   Planned Therapy Interventions (PT) bed mobility training;transfer training   PT Total Evaluation Time   PT Eval, Low Complexity Minutes (70367) 15   Therapy Certification   Start of care date 07/09/24   Certification date from 07/09/24   Certification date to 07/09/24   Medical Diagnosis Closed burst fracture of lumbar vertebra, initial encounter   Physical  Therapy Goals   PT Frequency One time eval and treatment only   PT Predicted Duration/Target Date for Goal Attainment 07/09/24   PT Goals Bed Mobility;Transfers   PT: Bed Mobility Within precautions;Independent;Goal Met;Completed   PT: Transfers Independent;Within precautions;Sit to/from stand;Goal Met;Completed   Interventions   Interventions Quick Adds Therapeutic Activity   Therapeutic Activity   Therapeutic Activities: dynamic activities to improve functional performance Minutes (69146) 9   Symptoms Noted During/After Treatment None   Treatment Detail/Skilled Intervention Eval completed. Tx initiated. Education on spinal precautions, and its applications to bed mobility and transfers. Education on correct don/doff of TLSO for OOB activity. After practice, patient demos ability to use log roll in/out of bed & maintain spinal precautions with transfers IND. Pt denies further questions with mobility, other than work restrictions. PT advised pt to bring this up with RN to see what MD recommends. Pt left supine with call light & all other needs inr each.   PT Discharge Planning   PT Plan DC PT.   PT Discharge Recommendation (DC Rec) home with assist   PT Rationale for DC Rec Patient mobilizing near functional baseline. Recommend home with assist as needed to help with patient maintaining spinal precautions.   PT Brief overview of current status SBA - IND, no AD   Total Session Time   Timed Code Treatment Minutes 9   Total Session Time (sum of timed and untimed services) 24   [facility]  OUTPATIENT PHYSICAL THERAPY EVALUATION  PLAN OF TREATMENT FOR OUTPATIENT REHABILITATION  (COMPLETE FOR INITIAL CLAIMS ONLY)  Patient's Last Name, First Name, M.I.  YOB: 1970  Barbara Camacho                        Provider's Name  [facility] Medical Record No.  [id number]                             Onset Date:  07/09/24   Start of Care Date:  07/09/24   Type:      _X_PT   ___OT   ___SLP Medical Diagnosis:  Closed burst fracture of lumbar vertebra, initial encounter              PT Diagnosis:  Impaired functional mobility Visits from SOC:  1     See note for plan of treatment, functional goals and certification details    I CERTIFY THE NEED FOR THESE SERVICES FURNISHED UNDER        THIS PLAN OF TREATMENT AND WHILE UNDER MY CARE     (Physician co-signature of this document indicates review and certification of the therapy plan).

## 2024-07-09 NOTE — PROGRESS NOTES
Patient discharged to home at 1300 via Private Car.  Accompanied by son and staff.  Discharge instructions were reviewed with patient, opportunity offered to ask questions.    Prescriptions printed and given to patient/family.  Access discontinued: Yes  Care plan and education discontinued: Yes  Home meds retrieved from pharmacy: Yes  Belongings were sent home with patient/family:  Cell phone/electronics: clothings .

## 2024-07-10 ENCOUNTER — PATIENT OUTREACH (OUTPATIENT)
Dept: CARE COORDINATION | Facility: CLINIC | Age: 54
End: 2024-07-10
Payer: MEDICAID

## 2024-07-10 NOTE — PROGRESS NOTES
Clinic Care Coordination Contact  Community Health Worker Initial Outreach    Patient accepts CC: No, Pt declined needs for extra support.     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 (Red Flag symptoms escalate to triage hotline per guidelines): Improved  Do you know how to contact your clinic care team if you have future questions or changes to your health status? : Yes  Does the patient have their discharge instructions? : Yes  Does the patient have questions regarding their discharge instructions? : No  Were you started on any new medications or were there changes to any of your previous medications? : Yes  Does the patient have all of their medications?: Yes  Do you have questions regarding any of your medications? : No  Do you have all of your needed medical supplies or equipment (DME)?  (i.e. oxygen tank, CPAP, cane, etc.): Yes    Post-op ([facility] CTA Only)  If the patient had a surgery or procedure, do they have any questions for a nurse?: No         Follow up Plan     Follow-up and recommended labs and tests  Your Neurosurgical follow-up appointments have been scheduled. You may call [phone] to make, confirm or change  your appointment dates and/or times.  Follow-up and recommended labs and tests  Follow up with primary care provider, GUME HERNÁNDEZ, within 14 days to evaluate medication change, to evaluate  treatment  change, and for hospital follow- up. The following labs/tests are recommended: CMP, CBC. It is recommended  that you contact your PCP and ask for a hospital follow-up - abnormal LFTs during hospital stay - were improving, but  hospital team wants follow-up 2 weeks later for repeat labs.    Future Appointments   Date Time Provider Department Center   8/1/2024  8:40 AM EYAD PHILLIPS 1 [facility]   8/1/2024  9:30 AM Donya Zhong, PAVÍCTOR SNN[facility]     Outpatient Plan as outlined on AVS reviewed with patient.    For any urgent concerns, please contact our 24 hour nurse triage line: [phone]     Oral, KATHLEEN  [phone]  [facility] Care [facility]

## 2024-07-11 LAB
AMPHET UR-MCNC: >5000 NG/ML
MDA UR-MCNC: <200 NG/ML
MDEA UR-MCNC: <200 NG/ML
MDMA UR-MCNC: <200 NG/ML
METHAMPHET UR-MCNC: NORMAL NG/ML
PHENTERMINE UR CFM-MCNC: <200 NG/ML

## 2024-07-17 ENCOUNTER — VIRTUAL VISIT (OUTPATIENT)
Dept: FAMILY MEDICINE | Facility: CLINIC | Age: 54
End: 2024-07-17
Payer: OTHER MISCELLANEOUS

## 2024-07-17 DIAGNOSIS — S32.040D CLOSED WEDGE COMPRESSION FRACTURE OF L4 VERTEBRA WITH ROUTINE HEALING, SUBSEQUENT ENCOUNTER: Primary | ICD-10-CM

## 2024-07-17 PROCEDURE — 99203 OFFICE O/P NEW LOW 30 MIN: CPT | Mod: 95 | Performed by: NURSE PRACTITIONER

## 2024-07-17 NOTE — LETTER
[facility]  [address] [place] [id number]  [phone]          July 17, 2024    Babrara Camacho                                                                                                                     [address] [place] [address]        To whom it may concern,    Barbara was evaluated today 7/17/2024 posthospitalization and will need to be out of work from 7/16/2024 -8/1/2024 when she will have further evaluation by specialist for return to work restrictions.        Sincerely,     Rosa Arteaga, DNP, APRN-CNP

## 2024-07-17 NOTE — PATIENT INSTRUCTIONS
Closed wedge compression fracture of L4 vertebra with routine healing, subsequent encounter  Patient hospitalized from 7/7/2024 through 7/9/2024 for acute versus subacute compression fracture.  Neurosurgery recommended bracing x 3 months and follow-up in 3 weeks.  Patient scheduled for August 1, 2024.  Patient still having moderate amount of pain and uncomfortableness.  Was advised by hospital she could go back to work after 7 days depending on how she is feeling, patient was supposed to return yesterday.  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  Provider agrees she should not be doing housekeeping duties, however patient does state they would likely be able to accommodate.  Given continued pain and nature of job, would recommend staying off of work until seen by neurosurgery on the first.  New letter written and neurosurgery can manage restrictions.

## 2024-07-17 NOTE — PROGRESS NOTES
"Barbara is a 53 year old who is being evaluated via a billable video visit.    How would you like to obtain your AVS? MyChart  If the video visit is dropped, the invitation should be resent by: Text to cell phone: [phone]  Will anyone else be joining your video visit? No      Assessment & Plan     Closed wedge compression fracture of L4 vertebra with routine healing, subsequent encounter  Patient hospitalized from 7/7/2024 through 7/9/2024 for acute versus subacute compression fracture.  Neurosurgery recommended bracing x 3 months and follow-up in 3 weeks.  Patient scheduled for August 1, 2024.  Patient still having moderate amount of pain and uncomfortableness.  Was advised by hospital she could go back to work after 7 days depending on how she is feeling, patient was supposed to return yesterday.  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  Provider agrees she should not be doing housekeeping duties, however patient does state they would likely be able to accommodate.  Given continued pain and nature of job, would recommend staying off of work until seen by neurosurgery on the first.  New letter written and neurosurgery can manage restrictions.        MED REC REQUIRED  Post Medication Reconciliation Status: discharge medications reconciled, continue medications without change  Nicotine/Tobacco Cessation  She reports that she has been smoking cigarettes. She started smoking about 38 years ago. She has a 19.3 pack-year smoking history. She has never used smokeless tobacco.  Nicotine/Tobacco Cessation Plan  Advised to follow-up with primary care provider      BMI  Estimated body mass index is 37.69 kg/m  as calculated from the following:    Height as of 7/8/24: 1.626 m (5' 4\").    Weight as of 7/8/24: 99.6 kg (219 lb 9.3 oz).   Weight management plan: Patient was referred to their PCP to discuss a diet and exercise plan.      See Patient Instructions    Subjective   Barbara is a " 53 year old, presenting for the following health issues:        7/17/2024     1:30 PM   Additional Questions   Roomed by Delma PEARSON   Accompanied by self         7/17/2024     1:30 PM   Patient Reported Additional Medications   Patient reports taking the following new medications no new meds     Video Start Time: 2:11 PM    HPI       Forms (Pt needs a letter for restrictions. Was on restriction for 7 days. So would like to discuss returning to work and restrictions. Pt reports still wearing brace at home, unsure if it is helping or not.  )    Works at a group home ~ states they are able to accommodate, but hesitant as they do push to do more  Still feeling uncomfortable in a lot of pain.  Has neurosurgery follow-up on August 1    Review of Systems  Constitutional, HEENT, cardiovascular, pulmonary, gi and gu systems are negative, except as otherwise noted.      Objective           Vitals:  No vitals were obtained today due to virtual visit.    Physical Exam   GENERAL: alert and no distress  EYES: Eyes grossly normal to inspection.  No discharge or erythema, or obvious scleral/conjunctival abnormalities.  RESP: No audible wheeze, cough, or visible cyanosis.    SKIN: Visible skin clear. No significant rash, abnormal pigmentation or lesions.  NEURO: Cranial nerves grossly intact.  Mentation and speech appropriate for age.  PSYCH: Appropriate affect, tone, and pace of words    Diagnostic Test Results:  none        Video-Visit Details    Type of service:  Video Visit   Video End Time:2:26 PM  Originating Location (pt. Location): Home    Distant Location (provider location):  On-site  Platform used for Video Visit: Jackson  Signed Electronically by: Jenni Mariano DNP,, DWIGHT CNP      Chart documentation with Dragon Voice recognition Software. Although reviewed after completion, some words and grammatical errors may remain.

## 2024-07-31 NOTE — PROGRESS NOTES
[facility] Neurosurgery  Neurosurgery Follow Up:    HPI: 53 year old female presents to NSGY clinic today for 3 week follow up for L5 burst fracture. Patient was moving a dresser 6/6/24. NSGY was consulted 7/7/24 and recommended TLSO when out of bed.    Today patient is overall doing well with improvement of low back pain.  Currently using Advil and Tylenol.  Has been using Robaxin however states this make her groggy.  Pain primarily located in low back rating to bilateral anterior thighs right greater than left.  Patient does note some urinary urgency denies true urinary incontinence.  No saddle anesthesia no recent fall/trauma.  Has been wearing brace as previously directed.  Patient notes she is scheduled to see her PCP later this week, recommended discussing DEXA scan.  Patient does note she has been working on cutting down on smoking, currently smoking 2 cigarettes/day, advised to continue tapering/discontinuing.    Medical, surgical, family, and social history unchanged since prior exam.  Exam:  Constitutional:  Alert, well nourished, NAD.  HEENT: Normocephalic, atraumatic.   Pulm:  Without shortness of breath   CV:  No pitting edema of BLE.      Vital Signs:  /77 (BP Location: Left arm, Patient Position: Sitting, Cuff Size: Adult Regular)   Pulse 73   LMP 06/30/2011   SpO2 99%     Neurological:  Awake  Alert  Oriented x 3  Motor exam:        IP Q DF PF EHL  R   5  5   5   5    5  L   5  5   5   5    5     Able to spontaneously move L/E bilaterally  Sensation intact throughout all L/E dermatomes  Nontender to palpation throughout thoracic lumbar spine and paraspinous muscles   Negative clonus bilaterally  TLSO brace in place    Imaging:   Imaging Interpretation provided by radiologist.   EXAM: XR LUMBAR SPINE 2/3 VIEWS  LOCATION: [facility]  DATE: 08/01/2024     INDICATION: Spinal fx. Obtain upright AP, lat views in brace.  COMPARISON: MRI 07/08/2024.                                                         IMPRESSION: L5 fracture. No change.    A/P:   L5 burst fracture.  Has been wearing brace as previously directed.  Improvement of back pain.  Supposed to follow-up with her PCP next week, advised to discuss DEXA scan.  Patient currently tapering/cutting down on smoking, advised to continue tapering/discontinue.    Plan:  -Continue wearing brace as previously directed  -Follow-up with PCP regarding DEXA scan, osteoporosis evaluation/management  -Continue tapering/discontinuing smoking  - Light activity only: Please limit your lifting to no more that ten pounds and avoid excessive bending, twisting and turning at the lumbar spine. You should also avoid excessive jostling and jarring activities.   -Will have patient follow-up in NSGY clinic in 3 weeks with x-rays prior    - Call the clinic at [phone] for increased pain or any other questions and concerns.    Patient verbalized understanding and is in agreement with the plan.       Donya Zhong PA-C  [facility] Neurosurgery  [address] [place] [phone]

## 2024-08-01 ENCOUNTER — OFFICE VISIT (OUTPATIENT)
Dept: NEUROSURGERY | Facility: CLINIC | Age: 54
End: 2024-08-01
Payer: OTHER MISCELLANEOUS

## 2024-08-01 ENCOUNTER — HOSPITAL ENCOUNTER (OUTPATIENT)
Dept: RADIOLOGY | Facility: HOSPITAL | Age: 54
Discharge: HOME | End: 2024-08-01
Attending: PHYSICIAN ASSISTANT | Admitting: PHYSICIAN ASSISTANT
Payer: OTHER MISCELLANEOUS

## 2024-08-01 VITALS — HEART RATE: 73 BPM | DIASTOLIC BLOOD PRESSURE: 77 MMHG | OXYGEN SATURATION: 99 % | SYSTOLIC BLOOD PRESSURE: 128 MMHG

## 2024-08-01 DIAGNOSIS — S32.001D CLOSED BURST FRACTURE OF LUMBAR VERTEBRA WITH ROUTINE HEALING, SUBSEQUENT ENCOUNTER: Primary | ICD-10-CM

## 2024-08-01 DIAGNOSIS — S32.040A CLOSED WEDGE COMPRESSION FRACTURE OF L4 VERTEBRA, INITIAL ENCOUNTER (H): ICD-10-CM

## 2024-08-01 PROCEDURE — 72100 X-RAY EXAM L-S SPINE 2/3 VWS: CPT

## 2024-08-01 PROCEDURE — 99213 OFFICE O/P EST LOW 20 MIN: CPT

## 2024-08-01 ASSESSMENT — PAIN SCALES - GENERAL: PAINLEVEL: SEVERE PAIN (6)

## 2024-08-01 NOTE — NURSING NOTE
"Barbara Camacho is a 53 year old female who presents for:  Chief Complaint   Patient presents with    RECHECK     Bilateral low-back pain   Radiating down bilateral LE        Initial Vitals:  /77 (BP Location: Left arm, Patient Position: Sitting, Cuff Size: Adult Regular)   Pulse 73   LMP 06/30/2011   SpO2 99%  Estimated body mass index is 37.69 kg/m  as calculated from the following:    Height as of 7/8/24: 5' 4\" (1.626 m).    Weight as of 7/8/24: 219 lb 9.3 oz (99.6 kg).. There is no height or weight on file to calculate BSA. BP completed using cuff size: regular  Severe Pain (6)    Cole Flores    "

## 2024-08-01 NOTE — PATIENT INSTRUCTIONS
- Continue wearing brace when out of bed, okay to have off for resting/sleeping/hygiene.     - Light activity only: Please limit your lifting to no more that ten pounds and avoid excessive bending, twisting and turning at the Lumbar spine. You should also avoid excessive jostling and jarring activities.      - Continue using Tylenol and Ibuprofen as needed. Okay to continue using Robaxin - if causing grogginess/sleepiness take in evening.     - Recommend alternating heat/ice.    - Keep appointment with PCP and discuss DEXA scan/osteoporosis evaluation     - Will plan to see in clinic in 3 weeks with Xrays prior.     - Contact clinic for questions or concerns or scheduling [phone]

## 2024-08-01 NOTE — LETTER
8/1/2024      Barbara Camacho  [address] [place] [address]      Dear Colleague,    Thank you for referring your patient, Barbara Camacho, to the [facility]. Please see a copy of my visit note below.    [facility] Neurosurgery  Neurosurgery Follow Up:    HPI: 53 year old female presents to NSGY clinic today for 3 week follow up for L5 burst fracture. Patient was moving a dresser 6/6/24. NSGY was consulted 7/7/24 and recommended TLSO when out of bed.    Today patient is overall doing well with improvement of low back pain.  Currently using Advil and Tylenol.  Has been using Robaxin however states this make her groggy.  Pain primarily located in low back rating to bilateral anterior thighs right greater than left.  Patient does note some urinary urgency denies true urinary incontinence.  No saddle anesthesia no recent fall/trauma.  Has been wearing brace as previously directed.  Patient notes she is scheduled to see her PCP later this week, recommended discussing DEXA scan.  Patient does note she has been working on cutting down on smoking, currently smoking 2 cigarettes/day, advised to continue tapering/discontinuing.    Medical, surgical, family, and social history unchanged since prior exam.  Exam:  Constitutional:  Alert, well nourished, NAD.  HEENT: Normocephalic, atraumatic.   Pulm:  Without shortness of breath   CV:  No pitting edema of BLE.      Vital Signs:  /77 (BP Location: Left arm, Patient Position: Sitting, Cuff Size: Adult Regular)   Pulse 73   LMP 06/30/2011   SpO2 99%     Neurological:  Awake  Alert  Oriented x 3  Motor exam:        IP Q DF PF EHL  R   5  5   5   5    5  L   5  5   5   5    5     Able to spontaneously move L/E bilaterally  Sensation intact throughout all L/E dermatomes  Nontender to palpation throughout thoracic lumbar spine and paraspinous muscles   Negative clonus bilaterally  TLSO brace in place    Imaging:   Imaging Interpretation  provided by radiologist.   EXAM: XR LUMBAR SPINE 2/3 VIEWS  LOCATION: [facility]  DATE: 08/01/2024     INDICATION: Spinal fx. Obtain upright AP, lat views in brace.  COMPARISON: MRI 07/08/2024.                                                        IMPRESSION: L5 fracture. No change.    A/P:   L5 burst fracture.  Has been wearing brace as previously directed.  Improvement of back pain.  Supposed to follow-up with her PCP next week, advised to discuss DEXA scan.  Patient currently tapering/cutting down on smoking, advised to continue tapering/discontinue.    Plan:  -Continue wearing brace as previously directed  -Follow-up with PCP regarding DEXA scan, osteoporosis evaluation/management  -Continue tapering/discontinuing smoking  - Light activity only: Please limit your lifting to no more that ten pounds and avoid excessive bending, twisting and turning at the lumbar spine. You should also avoid excessive jostling and jarring activities.   -Will have patient follow-up in NSGY clinic in 3 weeks with x-rays prior    - Call the clinic at [phone] for increased pain or any other questions and concerns.    Patient verbalized understanding and is in agreement with the plan.       Donya Zhong PA-C  [facility] Neurosurgery  [address] [place] [address]      Again, thank you for allowing me to participate in the care of your patient.        Sincerely,        Donya Zhong PA-C

## 2024-08-01 NOTE — LETTER
August 1, 2024      Barbara Camacho  [address] [place] [address]        To Whom It May Concern:    Barbara Camacho was seen in our clinic. She may return to work 8/7/24 with the following activity restrictions: Avoid lifting more than 10 pounds, limit repetitive bending and twisting motions. Patient recommended to wear TLSO brace when up and moving.       Sincerely,      Donya Zhong

## 2024-09-07 ENCOUNTER — HEALTH MAINTENANCE LETTER (OUTPATIENT)
Age: 54
End: 2024-09-07

## 2024-09-09 ENCOUNTER — OFFICE VISIT (OUTPATIENT)
Dept: PEDIATRICS | Facility: CLINIC | Age: 54
End: 2024-09-09
Payer: OTHER MISCELLANEOUS

## 2024-09-09 VITALS
HEART RATE: 79 BPM | OXYGEN SATURATION: 98 % | TEMPERATURE: 97.4 F | SYSTOLIC BLOOD PRESSURE: 112 MMHG | DIASTOLIC BLOOD PRESSURE: 75 MMHG | WEIGHT: 211.2 LBS | BODY MASS INDEX: 36.06 KG/M2 | HEIGHT: 64 IN | RESPIRATION RATE: 16 BRPM

## 2024-09-09 DIAGNOSIS — S32.001A CLOSED BURST FRACTURE OF LUMBAR VERTEBRA, INITIAL ENCOUNTER (H): ICD-10-CM

## 2024-09-09 DIAGNOSIS — C73 FOLLICULAR CARCINOMA OF THYROID (H): ICD-10-CM

## 2024-09-09 PROCEDURE — 99214 OFFICE O/P EST MOD 30 MIN: CPT | Performed by: NURSE PRACTITIONER

## 2024-09-09 RX ORDER — LEVOTHYROXINE SODIUM 150 UG/1
150 TABLET ORAL DAILY
Qty: 30 TABLET | Refills: 0 | Status: SHIPPED | OUTPATIENT
Start: 2024-09-09

## 2024-09-09 RX ORDER — ACETAMINOPHEN 500 MG
1000 TABLET ORAL EVERY 8 HOURS PRN
Qty: 90 TABLET | Refills: 0 | Status: SHIPPED | OUTPATIENT
Start: 2024-09-09

## 2024-09-09 RX ORDER — IBUPROFEN 200 MG
600 TABLET ORAL EVERY 6 HOURS PRN
Qty: 90 TABLET | Refills: 0 | Status: SHIPPED | OUTPATIENT
Start: 2024-09-09

## 2024-09-09 ASSESSMENT — ENCOUNTER SYMPTOMS: BACK PAIN: 1

## 2024-09-09 ASSESSMENT — PAIN SCALES - GENERAL: PAINLEVEL: EXTREME PAIN (9)

## 2024-09-09 NOTE — LETTER
September 9, 2024      Barbara Camacho  [address] [place] [address]        To Whom It May Concern:    Barbara Camacho was seen in our clinic today for follow-up of her low back pain. She sustained a closed burst fracture of the lumbar vertebra on June 26, 2024. Since that time she has been following closely with neurosurgery and has an upcoming routine follow-up appt on 9/12/24. Current activity limitations include the following: Light activity only: Please limit your lifting to no more that ten pounds and avoid excessive bending, twisting and turning at the lumbar spine. You should also avoid excessive jostling and jarring activities.      Barbara can return to work effective 9/10/2024 with the understanding that she will abide to the above activity restrictions.       Sincerely,          Abena Cox

## 2024-09-09 NOTE — PROGRESS NOTES
Assessment & Plan     Closed burst fracture of lumbar vertebra, initial encounter (H)  No red flag symptoms or exam findings. Reviewed max doses of tylenol and ibuprofen. Reviewed activity limitations recommended by neurosurgery and a work note was given allowing her to return to work with limitations. She will return with [facility] paperwork should that become applicable. She has follow-up with neurosurgery scheduled this week on 9/12. DEXA ordered and scheduled as recommended by neurosurgery.   - acetaminophen (TYLENOL) 500 MG tablet; Take 2 tablets (1,000 mg) by mouth every 8 hours as needed for mild pain.  - ibuprofen (ADVIL/MOTRIN) 200 MG tablet; Take 3 tablets (600 mg) by mouth every 6 hours as needed for pain.  - DX Bone Density; Future    Follicular carcinoma of thyroid (H)  She is requesting refill of levothyroxine. I gave her a one time refill of 30 tablets. She has follow-up this month with ENT, who she says she will be seeing for her thyroid. It appears she has not had her TSH checked in at least 3 years from what I can tell on chart review.   - levothyroxine (SYNTHROID/LEVOTHROID) 150 MCG tablet; Take 1 tablet (150 mcg) by mouth daily.        Subjective   Barbara is a 53 year old, presenting for the following health issues:  Back Pain      9/9/2024     4:26 PM   Additional Questions   Roomed by darline   Accompanied by arash         9/9/2024     4:26 PM   Patient Reported Additional Medications   Patient reports taking the following new medications arash       Presents today reporting new pain radiating down right leg. Barbara suffered an L5 burst fracture on 6/6/26 after moving a dresser at work. She is following with neurosurgery. She went back to work a couple weeks ago. 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 She feels okay for he first 3-4 hours of her shift, then she develops sharp burning pain that starts in her low back and goes down her right leg all the way down to her foot. This is a new symptom. She  "has not worked the past 3-4 days and pain has improved. She is alternating tylenol and ibuprofen. She takes tylenol 1500 mg every 4-6 hours and ibuprofen 800 mg every 6 hours. No new weakness in lower extremities. No saddle anesthesia or loss of bowel/bladder control.     She has quit smoking.           Objective    /75   Pulse 79   Temp 97.4  F (36.3  C) (Temporal)   Resp 16   Ht 1.626 m (5' 4\")   Wt 95.8 kg (211 lb 3.2 oz)   LMP 06/30/2011   SpO2 98%   BMI 36.25 kg/m    Body mass index is 36.25 kg/m .  Physical Exam  Constitutional:       General: She is not in acute distress.     Appearance: Normal appearance. She is not ill-appearing or toxic-appearing.   Cardiovascular:      Rate and Rhythm: Normal rate.   Pulmonary:      Effort: Pulmonary effort is normal. No respiratory distress.   Musculoskeletal:      Lumbar back: Tenderness and bony tenderness present. Decreased range of motion. Positive right straight leg raise test. Negative left straight leg raise test.      Comments: Curvature of the cervical, thoracic, and lumbar spine are within normal limits. Posture is upright, gait is smooth, steady, and within normal limits.     Tenderness is noted on palpation of the lumbar spinous processes. Spinous processes are midline. She notes tenderness to left and right lumbar paraspinal muscles.     ROM is limited due to discomfort noted with flexion, extension, and side-to-side rotation of the lumbar spine.    Straight leg raise test is positive on the right, negative on the left. DTR to patellar tendons is 2+ bilaterally.       Neurological:      General: No focal deficit present.      Mental Status: She is alert and oriented to person, place, and time.   Psychiatric:         Mood and Affect: Mood normal.         Behavior: Behavior normal.                    Signed Electronically by: DWIGHT Topete CNP    "

## 2024-09-11 ENCOUNTER — TELEPHONE (OUTPATIENT)
Dept: OPHTHALMOLOGY | Facility: CLINIC | Age: 54
End: 2024-09-11
Payer: MEDICAID

## 2024-09-12 ENCOUNTER — OFFICE VISIT (OUTPATIENT)
Dept: NEUROSURGERY | Facility: CLINIC | Age: 54
End: 2024-09-12
Payer: OTHER MISCELLANEOUS

## 2024-09-12 ENCOUNTER — HOSPITAL ENCOUNTER (OUTPATIENT)
Dept: GENERAL RADIOLOGY | Facility: HOSPITAL | Age: 54
Discharge: HOME | End: 2024-09-12
Payer: MEDICAID

## 2024-09-12 VITALS — OXYGEN SATURATION: 97 % | DIASTOLIC BLOOD PRESSURE: 74 MMHG | HEART RATE: 65 BPM | SYSTOLIC BLOOD PRESSURE: 125 MMHG

## 2024-09-12 DIAGNOSIS — S32.001D CLOSED BURST FRACTURE OF LUMBAR VERTEBRA WITH ROUTINE HEALING, SUBSEQUENT ENCOUNTER: ICD-10-CM

## 2024-09-12 DIAGNOSIS — S32.001A CLOSED BURST FRACTURE OF LUMBAR VERTEBRA, INITIAL ENCOUNTER (H): Primary | ICD-10-CM

## 2024-09-12 PROCEDURE — 72100 X-RAY EXAM L-S SPINE 2/3 VWS: CPT

## 2024-09-12 PROCEDURE — 99213 OFFICE O/P EST LOW 20 MIN: CPT | Performed by: NURSE PRACTITIONER

## 2024-09-12 ASSESSMENT — PAIN SCALES - GENERAL: PAINLEVEL: EXTREME PAIN (9)

## 2024-09-12 NOTE — PROGRESS NOTES
CHIEF COMPLAINT/REASON FOR VISIT  Continuing follow up for L5 fracture.    HISTORY OF PRESENT ILLNESS  Barbara Camacho is a 53 year old female with a past medical history of anxiety and follicular thyroid cancer who presented to the ED for back pain after moving a dresser on June 26th. She was found to have an L5 compression fracture. It was incorrectly identified as L4 due to partial disk but she does hae 5 Lumbar vertebral bodies. She was last seen on 8/1/24 which was approximately 4 weeks after her initial visit and about 5 weeks after her injury. She presents today and is now approximately 9.5 weeks from injury. She has returned to work for 8 hours and has had to call out due to increase in pain after work. She is employed as a . Upon returning to work she has developed sharp burning radiating pain down posterior leg on right side. This has gone all the way to the foot. Was constant when it came on initially but she has used tylenol and ibuprofen and that has helped significantly.   Today she is not sore. She is accompanied by her C.   Denies numbness and tingling     Current Outpatient Medications   Medication Sig Dispense Refill    acetaminophen (TYLENOL) 500 MG tablet Take 2 tablets (1,000 mg) by mouth every 8 hours as needed for mild pain. 90 tablet 0    ibuprofen (ADVIL/MOTRIN) 200 MG tablet Take 3 tablets (600 mg) by mouth every 6 hours as needed for pain. 90 tablet 0    levothyroxine (SYNTHROID/LEVOTHROID) 150 MCG tablet Take 1 tablet (150 mcg) by mouth daily. 30 tablet 0    methocarbamol (ROBAXIN) 500 MG tablet Take 1 tablet (500 mg) by mouth 3 times daily as needed for muscle spasms      Multiple Vitamins-Minerals (TAB-A-DAISY) TABS Take 1 tablet by mouth daily       No current facility-administered medications for this visit.       PHYSICAL EXAMINATION     Good bulk, tone and strength bilateral lowers with exception of RIGHT EHL which is 4/5 for me today.     DIAGNOSTICS  I independently  reviewed imaging in the form of lumbar xrays obtained on 9/12/24.They demonstrate stable alignment about the fracture with no significant  progression for the loss of height since previous imaging    ASSESSMENT/PLAN  L5 compression fracture  Lumbar radiculopathy    [place]all she is doing well but has recently developed intermittent radiculaopthy on her right side. Therefore we have written her off work to only work 4 hour days. I believe she has caused inflammation around her nerve root. Her MRI imaging previously did not elucidate any nerve root compression. We will continue the course and she will be seen for follow up in early October. In the interim she is seeing occupational med to better aid in her return to work. She should continue all restrictions on repetitive bending twisting lifting pushing an pulling, no lifting more than 10 lbs, brace when greater than 30 degrees. We can look at PT after her brace course has completed. Patient is in agreement with this plan, all questions have been answered.  No barriers to this communication.

## 2024-09-12 NOTE — NURSING NOTE
"Barbara Camacho is a 53 year old female who presents for:  Chief Complaint   Patient presents with    RECHECK     Fracture follow up   Bilateral low-back 9/10 pain   \"Pain started radiating down R LE to foot about a week ago\"        Initial Vitals:  /74 (BP Location: Left arm, Patient Position: Sitting, Cuff Size: Adult Regular)   Pulse 65   LMP 06/30/2011   SpO2 97%  Estimated body mass index is 36.25 kg/m  as calculated from the following:    Height as of 9/9/24: 5' 4\" (1.626 m).    Weight as of 9/9/24: 211 lb 3.2 oz (95.8 kg).. There is no height or weight on file to calculate BSA. BP completed using cuff size: regular  Extreme Pain (9)    Cole Flores    "

## 2024-09-12 NOTE — LETTER
September 12, 2024      Barbara Camacho  [address] [place] [address]        To Whom It May Concern:    Barbara Camacho was seen in our clinic. She may return to work with the following: Not more than 4 hours per shift, continue previous restrictions until occupational medicine evaluations.       Sincerely,      Elif Anand

## 2024-09-17 ENCOUNTER — MEDICAL CORRESPONDENCE (OUTPATIENT)
Dept: HEALTH INFORMATION MANAGEMENT | Facility: CLINIC | Age: 54
End: 2024-09-17
Payer: MEDICAID

## 2024-09-18 ENCOUNTER — TELEPHONE (OUTPATIENT)
Dept: OPHTHALMOLOGY | Facility: CLINIC | Age: 54
End: 2024-09-18
Payer: MEDICAID

## 2024-09-24 ENCOUNTER — E-VISIT (OUTPATIENT)
Dept: PEDIATRICS | Facility: CLINIC | Age: 54
End: 2024-09-24
Payer: MEDICAID

## 2024-09-24 DIAGNOSIS — S32.001A CLOSED BURST FRACTURE OF LUMBAR VERTEBRA, INITIAL ENCOUNTER (H): Primary | ICD-10-CM

## 2024-09-24 PROCEDURE — 99207 PR NON-BILLABLE SERV PER CHARTING: CPT | Performed by: NURSE PRACTITIONER

## 2024-09-30 ENCOUNTER — TELEPHONE (OUTPATIENT)
Dept: OPHTHALMOLOGY | Facility: CLINIC | Age: 54
End: 2024-09-30
Payer: MEDICAID

## 2024-10-08 DIAGNOSIS — C73 FOLLICULAR CARCINOMA OF THYROID (H): ICD-10-CM

## 2024-10-08 RX ORDER — LEVOTHYROXINE SODIUM 150 UG/1
150 TABLET ORAL DAILY
Qty: 30 TABLET | Refills: 0 | Status: SHIPPED | OUTPATIENT
Start: 2024-10-08

## 2025-01-25 ENCOUNTER — OFFICE VISIT (OUTPATIENT)
Dept: URGENT CARE | Facility: URGENT CARE | Age: 55
End: 2025-01-25
Payer: MEDICAID

## 2025-01-25 VITALS
RESPIRATION RATE: 18 BRPM | TEMPERATURE: 97.6 F | SYSTOLIC BLOOD PRESSURE: 117 MMHG | DIASTOLIC BLOOD PRESSURE: 79 MMHG | OXYGEN SATURATION: 99 % | HEART RATE: 80 BPM

## 2025-01-25 DIAGNOSIS — L02.91 ABSCESS: Primary | ICD-10-CM

## 2025-01-25 PROBLEM — E89.0 HYPOTHYROIDISM, POSTSURGICAL: Status: ACTIVE | Noted: 2021-06-07

## 2025-01-25 PROBLEM — R73.03 PREDIABETES: Status: ACTIVE | Noted: 2020-11-27

## 2025-01-25 PROBLEM — M81.0 OSTEOPOROSIS, POST-MENOPAUSAL: Status: ACTIVE | Noted: 2024-10-17

## 2025-01-25 PROCEDURE — 99213 OFFICE O/P EST LOW 20 MIN: CPT

## 2025-01-25 RX ORDER — CALCITONIN SALMON 200 [IU]/.09ML
1 SPRAY, METERED NASAL DAILY
COMMUNITY
Start: 2025-01-13

## 2025-01-25 RX ORDER — BUSPIRONE HYDROCHLORIDE 10 MG/1
10 TABLET ORAL 3 TIMES DAILY
COMMUNITY
Start: 2025-01-11

## 2025-01-25 RX ORDER — PROPRANOLOL HYDROCHLORIDE 10 MG/1
10 TABLET ORAL 3 TIMES DAILY PRN
COMMUNITY
Start: 2025-01-15

## 2025-01-25 RX ORDER — ERGOCALCIFEROL 1.25 MG/1
50000 CAPSULE, LIQUID FILLED ORAL
COMMUNITY
Start: 2025-01-11

## 2025-01-25 RX ORDER — SULFAMETHOXAZOLE AND TRIMETHOPRIM 800; 160 MG/1; MG/1
1 TABLET ORAL 2 TIMES DAILY
Qty: 10 TABLET | Refills: 0 | Status: SHIPPED | OUTPATIENT
Start: 2025-01-25

## 2025-01-25 RX ORDER — BUPROPION HYDROCHLORIDE 150 MG/1
1 TABLET ORAL DAILY
COMMUNITY
Start: 2025-01-13

## 2025-01-25 RX ORDER — TIZANIDINE 2 MG/1
2-4 TABLET ORAL 3 TIMES DAILY PRN
COMMUNITY
Start: 2025-01-16

## 2025-01-25 RX ORDER — HYDROXYZINE HYDROCHLORIDE 50 MG/1
50 TABLET, FILM COATED ORAL EVERY 8 HOURS PRN
COMMUNITY
Start: 2025-01-16

## 2025-01-25 RX ORDER — MIRTAZAPINE 15 MG/1
15 TABLET, FILM COATED ORAL AT BEDTIME
COMMUNITY
Start: 2025-01-15

## 2025-01-25 NOTE — PATIENT INSTRUCTIONS
Thank you for coming in to see us today!    - Take the antibiotic twice per day for 5 days  - Continue applying heat to the area throughout the day  - Take Tylenol or ibuprofen as needed for soreness  - Follow up in 1 week if no improvement    Sachin Coleman,

## 2025-01-25 NOTE — PROGRESS NOTES
"Assessment & Plan     Abscess  Barbara presents with lesions on her right groin area and right buttock consistent with a local abscess. I&D was not performed in clinic because it appears the anterior lesion is actively draining and the posterior lesion was too small to be drained. Purulent discharge was present, and patient reports a history of staph infections, so will treat with 5 days of Bactrim DS BID.   - sulfamethoxazole-trimethoprim (BACTRIM DS) 800-160 MG tablet  Dispense: 10 tablet; Refill: 0     DO RACH [facility]    Della Jimenez is a 54 year old female who presents to clinic today for the following health issues:  Chief Complaint   Patient presents with    Lesion     X 3 day Bump on right lower abdomen and butt.     HPI  Barbara presents with a \"cyst\" on her lower abdomen/groin area.  She reports has been present for about 3 days.  She also has a similar developing lesion on her right buttock.  She has had staph infections in the past, and she believes that this is what she is currently dealing with.  She reports the lesion on her abdomen/groin area broke open yesterday and has been draining some foul-smelling fluid.  She has been applying heat to the area, but not taking any other medications.  She had a virtual visit earlier today where they recommended that she come in for evaluation in person.  She has not been on any antibiotics for this in the past few days.    ROS: 10 point ROS neg other than the symptoms noted above in the HPI.       Objective    /79   Pulse 80   Temp 97.6  F (36.4  C)   Resp 18   LMP 06/30/2011   SpO2 99%   Physical Exam  Vitals reviewed.   Constitutional:       General: She is not in acute distress.     Appearance: She is obese. She is not ill-appearing or toxic-appearing.   HENT:      Head: Normocephalic and atraumatic.      Right Ear: External ear normal.      Left Ear: External ear normal.   Eyes:      Extraocular Movements: " Extraocular movements intact.      Conjunctiva/sclera: Conjunctivae normal.   Cardiovascular:      Rate and Rhythm: Normal rate.   Pulmonary:      Effort: Pulmonary effort is normal. No respiratory distress.   Skin:     Findings: Lesion present.      Comments: Abscess-like lesion present in the anterior lower abdomen/groin area.  Open, and draining some foul-smelling purulent discharge.  Unable to express more with light pressure.  No surrounding erythema concerning for cellulitis.  There is a similar, smaller, nondraining lesion on her right buttock.  See image below for lower abdomen/groin lesion   Neurological:      Mental Status: She is alert and oriented to person, place, and time. Mental status is at baseline.   Psychiatric:         Mood and Affect: Mood normal.         Behavior: Behavior normal.         Thought Content: Thought content normal.         Judgment: Judgment normal.

## 2025-03-10 ENCOUNTER — OFFICE VISIT (OUTPATIENT)
Dept: URGENT CARE | Facility: URGENT CARE | Age: 55
End: 2025-03-10
Payer: COMMERCIAL

## 2025-03-10 ENCOUNTER — HOSPITAL ENCOUNTER (EMERGENCY)
Facility: HOSPITAL | Age: 55
Discharge: LEFT BEFORE DISCHARGE | End: 2025-03-10
Attending: EMERGENCY MEDICINE | Admitting: EMERGENCY MEDICINE
Payer: COMMERCIAL

## 2025-03-10 VITALS
BODY MASS INDEX: 35.68 KG/M2 | OXYGEN SATURATION: 99 % | HEART RATE: 79 BPM | TEMPERATURE: 97.2 F | SYSTOLIC BLOOD PRESSURE: 113 MMHG | HEIGHT: 64 IN | DIASTOLIC BLOOD PRESSURE: 74 MMHG | RESPIRATION RATE: 18 BRPM | WEIGHT: 209 LBS

## 2025-03-10 VITALS
HEART RATE: 81 BPM | RESPIRATION RATE: 20 BRPM | DIASTOLIC BLOOD PRESSURE: 74 MMHG | OXYGEN SATURATION: 97 % | SYSTOLIC BLOOD PRESSURE: 120 MMHG | TEMPERATURE: 98.3 F

## 2025-03-10 DIAGNOSIS — M79.662 PAIN OF LEFT LOWER LEG: Primary | ICD-10-CM

## 2025-03-10 DIAGNOSIS — M79.662 PAIN OF LEFT LOWER LEG: ICD-10-CM

## 2025-03-10 PROCEDURE — 3074F SYST BP LT 130 MM HG: CPT | Performed by: FAMILY MEDICINE

## 2025-03-10 PROCEDURE — 99207 PR NO CHARGE LOS: CPT | Performed by: FAMILY MEDICINE

## 2025-03-10 PROCEDURE — 99282 EMERGENCY DEPT VISIT SF MDM: CPT

## 2025-03-10 PROCEDURE — 3078F DIAST BP <80 MM HG: CPT | Performed by: FAMILY MEDICINE

## 2025-03-10 ASSESSMENT — COLUMBIA-SUICIDE SEVERITY RATING SCALE - C-SSRS
2. HAVE YOU ACTUALLY HAD ANY THOUGHTS OF KILLING YOURSELF IN THE PAST MONTH?: NO
1. IN THE PAST MONTH, HAVE YOU WISHED YOU WERE DEAD OR WISHED YOU COULD GO TO SLEEP AND NOT WAKE UP?: NO
6. HAVE YOU EVER DONE ANYTHING, STARTED TO DO ANYTHING, OR PREPARED TO DO ANYTHING TO END YOUR LIFE?: NO

## 2025-03-10 NOTE — ED TRIAGE NOTES
Patient reports seen at  today and referred here for U/S.  Has area to the left shin that is painful to touch and has slight redness surrounding.  Denies any blood thinning medications.  Patient states she did fall but injured her right leg earlier this week.

## 2025-03-10 NOTE — ED NOTES
Expected Patient Referral to ED  6:43 PM    Referring Clinic/Provider:  [facility] in [facility]    Reason for referral/Clinical facts:  54 F with cancer hx, had trauma to leg, has a palpable superficial cord on LE. No US at walk in care.    Recommendations provided:  Send to ED for further evaluation    Caller was informed that this institution does possess the capabilities and/or resources to provide for patient and should be transferred to our facility.    Discussed that if direct admit is sought and any hurdles are encountered, this ED would be happy to see the patient and evaluate.    Informed caller that recommendations provided are recommendations based only on the facts provided and that they responsible to accept or reject the advice, or to seek a formal in person consultation as needed and that this ED will see/treat patient should they arrive.      Erika Atkinson MD  [facility] EMERGENCY DEPARTMENT  [address] [place] [id number]  [phone]       Erika Atkinson MD  03/10/25 [id number]

## 2025-03-11 NOTE — ED PROVIDER NOTES
EMERGENCY DEPARTMENT ENCOUNTER      NAME: Barbara Camacho  AGE: 54 year old female  YOB: 1970  MRN: [id number]  EVALUATION DATE & TIME: No admission date for patient encounter.    PCP: No Ref-Primary, Physician    ED PROVIDER: Elaina Mendoza MD      Chief Complaint   Patient presents with    Leg Problem         FINAL IMPRESSION:  1. Pain of left lower leg          ED COURSE & MEDICAL DECISION MAKIN:23 PM Met with patient for initial interview and exam.   8:30 PM Patient left AMA.  Per chart, the ultrasound tech called for the patient twice with no answer.  Triage try to call patient's cell phone with no answer.  They called for her 2 more times with no response.  Patient thought to have left AMA without discussing with anyone.  Imaging canceled.    Pertinent Labs & Imaging studies reviewed. (See chart for details)  54 year old female presents to the Emergency Department for evaluation of leg pain.  Patient reports that she fell 3 days ago, mostly had pain in her right knee but the pain is improved, last night when she was kneeling, she noticed a painful bump just below her left knee.  She has been ambulatory without difficulty.  She denies pain elsewhere.  On exam, she has varicose veins in the area of pain.  No palpable cord.  No calf tenderness.  No chest pain or shortness of breath.  Will plan for ultrasound to evaluate for superficial thrombophlebitis, x-ray to ensure no bony abnormality given her recent fall.    At the conclusion of the encounter I discussed the results of all of the tests and the disposition. The questions were answered. The patient or family acknowledged understanding and was agreeable with the care plan.       Medical Decision Making  Obtained supplemental history:Supplemental history obtained?: No  Reviewed external records: External records reviewed?: Outpatient Record: [facility] 3/10/2025  Care impacted by chronic illness:Documented in Chart  Did you  consider but not order tests?: Work up considered but not performed and documented in chart, if applicable  Did you interpret images independently?: Independent interpretation of ECG and images noted in documentation, when applicable.  Consultation discussion with other provider:Did you involve another provider (consultant, , pharmacy, etc.)?: No  Discharge. No recommendations on prescription strength medication(s). See documentation for any additional details.    MIPS: Not Applicable        MEDICATIONS GIVEN IN THE EMERGENCY:  Medications - No data to display    NEW PRESCRIPTIONS STARTED AT TODAY'S ER VISIT  Discharge Medication List as of 3/10/2025  8:19 PM             =================================================================    HPI    Patient information was obtained from: Patient     Use of : N/A         Barbara Camacho is a 54 year old female with a pertinent history of prediabetes, osteoporosis, hx ovarian cancer, who presents to this ED via private car for evaluation of leg problem. She reports a fall 3 days ago where she fell on both legs. She reports pain her right leg, but pain is getting better. Last night she noticed a painful bump on her left knee. She recently had Norovirus. Denies chest pain or shortness of breath. No history of clots in her legs or lungs. She takes propanolol for anxiety.     Per chart review, patient seen at [facility] earlier today for evaluation of leg problem. Sent to this ED for suspected DVT.     PAST MEDICAL HISTORY:  Past Medical History:   Diagnosis Date    Anemia     Anxiety     Cancer (H)     ovarian cancer at age 26 - treated with right oophorectomy. no chemo/radiation    Follicular carcinoma of thyroid (H) 10/30/2019    Gastroesophageal reflux disease     Substance abuse (H)     Thyroid nodule 2016    bx [facility] at [facility]        PAST SURGICAL HISTORY:  Past Surgical History:   Procedure Laterality Date    APPENDECTOMY OPEN       angeles and ovary removed all at same time as appy    CHOLECYSTECTOMY      FINE NEEDLE ASPIRATION  2019    thyroid    HERNIA REPAIR      LAPAROSCOPIC BYPASS GASTRIC  7/20/2011    Procedure:LAPAROSCOPIC BYPASS GASTRIC; Hiatal hernia repair; Surgeon:FRANKIE VUONG; Location:[facility] OR    OOPHORECTOMY  2000?    THYROIDECTOMY Right 10/21/2019    Procedure: Right Thyroidectomy;  Surgeon: Glenroy Nichols MD;  Location: [facility] OR       CURRENT MEDICATIONS:    acetaminophen (TYLENOL) 500 MG tablet  buPROPion (WELLBUTRIN XL) 150 MG 24 hr tablet  busPIRone (BUSPAR) 10 MG tablet  calcitonin, salmon, (MIACALCIN) 200 UNIT/ACT nasal spray  hydrOXYzine HCl (ATARAX) 50 MG tablet  ibuprofen (ADVIL/MOTRIN) 200 MG tablet  levothyroxine (SYNTHROID/LEVOTHROID) 150 MCG tablet  methocarbamol (ROBAXIN) 500 MG tablet  mirtazapine (REMERON) 15 MG tablet  Multiple Vitamins-Minerals (TAB-A-DAISY) TABS  propranolol (INDERAL) 10 MG tablet  romosozumab-aqqg (EVENITY) 105 MG/1.17ML injection  sulfamethoxazole-trimethoprim (BACTRIM DS) 800-160 MG tablet  tiZANidine (ZANAFLEX) 2 MG tablet  vitamin D2 (ERGOCALCIFEROL) 40309 units (1250 mcg) capsule        ALLERGIES:  Allergies   Allergen Reactions    Codeine Itching    Tylenol W/Codeine [Acetaminophen-Codeine] Hives       FAMILY HISTORY:  Family History   Problem Relation Age of Onset    Coronary Artery Disease Maternal Grandmother     Hypertension Maternal Grandmother     Unknown/Adopted Maternal Grandmother     Depression Maternal Grandmother     Heart Disease Maternal Grandmother     Stomach Problem Maternal Grandmother     Diabetes Paternal Grandmother     Dementia Paternal Grandmother     Asthma Paternal Grandmother     Rheumatoid Arthritis Mother     Unknown/Adopted Mother     Depression Mother     Myocardial Infarction Father     Hypertension Father     Asthma Father     Depression Father     Heart Disease Father     Back Pain Maternal Grandfather     Substance Abuse Maternal Grandfather     Dementia  Maternal Grandfather     Unknown/Adopted Maternal Grandfather     Rheumatoid Arthritis Maternal Aunt     Stomach Cancer Maternal Aunt     No Known Problems Paternal Grandfather     Myocardial Infarction Paternal Aunt     Cerebrovascular Disease Paternal Aunt     Thyroid Disease Other     Migraines Other     Cancer No family hx of        SOCIAL HISTORY:   Social History     Socioeconomic History    Marital status:    Tobacco Use    Smoking status: Former     Current packs/day: 0.20     Average packs/day: 0.2 packs/day for 39.2 years (7.8 ttl pk-yrs)     Types: Cigarettes     Start date: 1986    Smokeless tobacco: Never   Vaping Use    Vaping status: Never Used   Substance and Sexual Activity    Alcohol use: Not Currently    Drug use: Not Currently     Comment: 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 In outpatient treatment.     Sexual activity: Not Currently     Partners: Male     Birth control/protection: None     Social Drivers of Health     Financial Resource Strain: Low Risk  (9/9/2024)    Financial Resource Strain     Within the past 12 months, have you or your family members you live with been unable to get utilities (heat, electricity) when it was really needed?: No   Food Insecurity: High Risk (9/9/2024)    Food Insecurity     Within the past 12 months, did you worry that your food would run out before you got money to buy more?: Yes     Within the past 12 months, did the food you bought just not last and you didn t have money to get more?: Yes   Transportation Needs: Low Risk  (9/9/2024)    Transportation Needs     Within the past 12 months, has lack of transportation kept you from medical appointments, getting your medicines, non-medical meetings or appointments, work, or from getting things that you need?: No   Physical Activity: Unknown (9/9/2024)    Exercise Vital Sign     Days of Exercise per Week: 0 days     Minutes of Exercise per Session: Patient declined   Stress: Stress  "Concern Present (9/9/2024)    Citizen of [place] [employer] of Occup[employer] - Occupational Stress Questionnaire     Feeling of Stress : Very much   Social Connections: Unknown (9/9/2024)    Social Connection and Isolation Panel [NHANES]     Frequency of Social Gatherings with Friends and Family: Once a week   Interpersonal Safety: Low Risk  (9/9/2024)    Interpersonal Safety     Do you feel physically and emotionally safe where you currently live?: Yes     Within the past 12 months, have you been hit, slapped, kicked or otherwise physically hurt by someone?: No     Within the past 12 months, have you been humiliated or emotionally abused in other ways by your partner or ex-partner?: No   Housing Stability: High Risk (9/9/2024)    Housing Stability     Do you have housing? : Patient declined     Are you worried about losing your housing?: Yes       VITALS:  /74   Pulse 79   Temp 97.2  F (36.2  C) (Temporal)   Resp 18   Ht 1.626 m (5' 4\")   Wt 94.8 kg (209 lb)   LMP 06/30/2011   SpO2 99%   BMI 35.87 kg/m      PHYSICAL EXAM    Constitutional: Well developed, Well nourished, NAD  HENT: Normocephalic, Atraumatic, Bilateral external ears normal, Oropharynx normal, mucous membranes moist, Nose normal.   Neck- Normal range of motion, No tenderness, Supple, No stridor.  Eyes: PERRL, EOMI, Conjunctiva normal, No discharge.   Respiratory: Normal breath sounds, No respiratory distress  Cardiovascular: Normal heart rate, Regular rhythm  Musculoskeletal: No edema. Good range of motion in all major joints. No major deformities noted. No calf tenderness to palpation. Palpable varicose veins left anterior shin without palpable cord. Left shin tender to palpation.   Integument: Warm, Dry, No erythema, No rash  Neurologic: Alert & oriented x 3, Normal motor function, Normal sensory function, No focal deficits noted. Normal gait.   Psychiatric: Affect normal, Judgment normal, Mood normal.      LAB:  All pertinent labs reviewed " and interpreted.       RADIOLOGY:  Reviewed all pertinent imaging. Please see official radiology report.  No orders to display         I, Jeny Wilburn , am serving as a scribe to document services personally performed by Elaina Mendoza MD, based on my observation and the provider's statements to me. I, Elaina Mendoza MD, attest that Jeny Wilburn is acting in a scribe capacity, has observed my performance of the services and has documented them in accordance with my direction.    Elaina Mendoza MD  Emergency Medicine  [facility] EMERGENCY DEPARTMENT  [address] [place] [id number]  [phone]       Elaina Mendoza MD  03/10/25 2037

## 2025-05-28 DIAGNOSIS — K90.9 INTESTINAL MALABSORPTION: Primary | ICD-10-CM

## 2025-05-29 ENCOUNTER — TELEPHONE (OUTPATIENT)
Dept: ENDOCRINOLOGY | Facility: CLINIC | Age: 55
End: 2025-05-29
Payer: COMMERCIAL

## 2025-05-29 NOTE — TELEPHONE ENCOUNTER
Patient confirmed scheduled appointment:     Scheduled labs for 6/30/25 @ [facility] location    Date: 7/10/25  Time: 10 am  Visit type: New Bariatric - reestablish  Visit mode: Virtual Visit  Provider:  Abena Kimble PA-C  [place] [facility] [place]    Additional Notes:   Had scheduled with Dr Jordan, but should start with [place]    New Re Establish Care/BMI 39.9 with comorbs/Pt.,advised to check ins/Pt. had Mylene En Y in 2009 or 20210 with Dr. Sargent at [facility].    time ok by Rosemary

## 2025-06-30 ENCOUNTER — LAB (OUTPATIENT)
Dept: LAB | Facility: CLINIC | Age: 55
End: 2025-06-30
Payer: COMMERCIAL

## 2025-06-30 DIAGNOSIS — K90.9 INTESTINAL MALABSORPTION: ICD-10-CM

## 2025-06-30 LAB
ALBUMIN SERPL BCG-MCNC: 3.9 G/DL (ref 3.5–5.2)
ALP SERPL-CCNC: 74 U/L (ref 40–150)
ALT SERPL W P-5'-P-CCNC: 20 U/L (ref 0–50)
ANION GAP SERPL CALCULATED.3IONS-SCNC: 8 MMOL/L (ref 7–15)
AST SERPL W P-5'-P-CCNC: 23 U/L (ref 0–45)
BILIRUB SERPL-MCNC: <0.2 MG/DL
BUN SERPL-MCNC: 15.6 MG/DL (ref 6–20)
CALCIUM SERPL-MCNC: 9.3 MG/DL (ref 8.8–10.4)
CHLORIDE SERPL-SCNC: 110 MMOL/L (ref 98–107)
CHOLEST SERPL-MCNC: 214 MG/DL
CREAT SERPL-MCNC: 0.63 MG/DL (ref 0.51–0.95)
EGFRCR SERPLBLD CKD-EPI 2021: >90 ML/MIN/1.73M2
ERYTHROCYTE [DISTWIDTH] IN BLOOD BY AUTOMATED COUNT: 18.1 % (ref 10–15)
EST. AVERAGE GLUCOSE BLD GHB EST-MCNC: 117 MG/DL
FASTING STATUS PATIENT QL REPORTED: YES
FASTING STATUS PATIENT QL REPORTED: YES
FERRITIN SERPL-MCNC: 20 NG/ML (ref 11–328)
GLUCOSE SERPL-MCNC: 84 MG/DL (ref 70–99)
HBA1C MFR BLD: 5.7 %
HCO3 SERPL-SCNC: 22 MMOL/L (ref 22–29)
HCT VFR BLD AUTO: 37 % (ref 35–47)
HDLC SERPL-MCNC: 63 MG/DL
HGB BLD-MCNC: 12.4 G/DL (ref 11.7–15.7)
LDLC SERPL CALC-MCNC: 118 MG/DL
MCH RBC QN AUTO: 29.4 PG (ref 26.5–33)
MCHC RBC AUTO-ENTMCNC: 33.5 G/DL (ref 31.5–36.5)
MCV RBC AUTO: 88 FL (ref 78–100)
NONHDLC SERPL-MCNC: 151 MG/DL
PLATELET # BLD AUTO: 270 10E3/UL (ref 150–450)
POTASSIUM SERPL-SCNC: 4.1 MMOL/L (ref 3.4–5.3)
PROT SERPL-MCNC: 6.6 G/DL (ref 6.4–8.3)
PTH-INTACT SERPL-MCNC: 24 PG/ML (ref 15–65)
RBC # BLD AUTO: 4.22 10E6/UL (ref 3.8–5.2)
SODIUM SERPL-SCNC: 140 MMOL/L (ref 135–145)
TRIGL SERPL-MCNC: 166 MG/DL
VIT B12 SERPL-MCNC: 1614 PG/ML (ref 232–1245)
VIT D+METAB SERPL-MCNC: 43 NG/ML (ref 20–50)
WBC # BLD AUTO: 6.2 10E3/UL (ref 4–11)

## 2025-06-30 PROCEDURE — 80061 LIPID PANEL: CPT

## 2025-06-30 PROCEDURE — 83970 ASSAY OF PARATHORMONE: CPT

## 2025-06-30 PROCEDURE — 85027 COMPLETE CBC AUTOMATED: CPT

## 2025-06-30 PROCEDURE — 83036 HEMOGLOBIN GLYCOSYLATED A1C: CPT

## 2025-06-30 PROCEDURE — 82607 VITAMIN B-12: CPT

## 2025-06-30 PROCEDURE — 82728 ASSAY OF FERRITIN: CPT

## 2025-06-30 PROCEDURE — 36415 COLL VENOUS BLD VENIPUNCTURE: CPT

## 2025-06-30 PROCEDURE — 82306 VITAMIN D 25 HYDROXY: CPT

## 2025-06-30 PROCEDURE — 99000 SPECIMEN HANDLING OFFICE-LAB: CPT

## 2025-06-30 PROCEDURE — 84590 ASSAY OF VITAMIN A: CPT | Mod: 90

## 2025-06-30 PROCEDURE — 80053 COMPREHEN METABOLIC PANEL: CPT

## 2025-07-02 LAB
ANNOTATION COMMENT IMP: NORMAL
RETINYL PALMITATE SERPL-MCNC: <0.02 MG/L
VIT A SERPL-MCNC: 0.42 MG/L

## 2025-07-09 NOTE — PROGRESS NOTES
New Re-establish Bariatric Surgery Note    RE: Barbara Camacho  MR#: [id number]  : 1970  VISIT DATE: Jul 10, 2025      Dear No Ref-Primary, Physician,    I had the pleasure of seeing your patient, Barbara Camacho, in my post-bariatric surgery assessment clinic.    Assessment & Plan   Problem List Items Addressed This Visit       Hypothyroidism, postsurgical    Relevant Medications    omeprazole (PRILOSEC) 20 MG DR capsule    semaglutide-weight management (WEGOVY) 0.25 MG/0.5ML pen    Semaglutide-Weight Management (WEGOVY) 0.5 MG/0.5ML pen    Other Relevant Orders    Med Therapy Management Referral    Adult Bariatrics and Weight Management Clinic Follow-Up Order    Adult Bariatrics and Weight Management Clinic Follow-Up Order    Prediabetes    Relevant Medications    omeprazole (PRILOSEC) 20 MG DR capsule    semaglutide-weight management (WEGOVY) 0.25 MG/0.5ML pen    Semaglutide-Weight Management (WEGOVY) 0.5 MG/0.5ML pen    Other Relevant Orders    Med Therapy Management Referral    Adult Bariatrics and Weight Management Clinic Follow-Up Order    Adult Bariatrics and Weight Management Clinic Follow-Up Order     Other Visit Diagnoses         Class 2 severe obesity with serious comorbidity and body mass index (BMI) of 39.0 to 39.9 in adult, unspecified obesity type (H)    -  Primary    Relevant Medications    omeprazole (PRILOSEC) 20 MG DR capsule    semaglutide-weight management (WEGOVY) 0.25 MG/0.5ML pen    Semaglutide-Weight Management (WEGOVY) 0.5 MG/0.5ML pen    Other Relevant Orders    Med Therapy Management Referral    Adult Bariatrics and Weight Management Clinic Follow-Up Order    Adult Bariatrics and Weight Management Clinic Follow-Up Order      History of Mylene-en-Y gastric bypass        Relevant Medications    omeprazole (PRILOSEC) 20 MG DR capsule    semaglutide-weight management (WEGOVY) 0.25 MG/0.5ML pen    Semaglutide-Weight Management (WEGOVY) 0.5 MG/0.5ML pen    Other Relevant Orders    Med Therapy  Management Referral    Adult Bariatrics and Weight Management Clinic Follow-Up Order    Adult Bariatrics and Weight Management Clinic Follow-Up Order      Gastroesophageal reflux disease, unspecified whether esophagitis present        Relevant Medications    omeprazole (PRILOSEC) 20 MG DR capsule    semaglutide-weight management (WEGOVY) 0.25 MG/0.5ML pen    Semaglutide-Weight Management (WEGOVY) 0.5 MG/0.5ML pen    Other Relevant Orders    Med Therapy Management Referral    Adult Bariatrics and Weight Management Clinic Follow-Up Order    Adult Bariatrics and Weight Management Clinic Follow-Up Order               57 minutes spent by me on the date of the encounter doing chart review, history and exam, documentation and further activities per the note    CHIEF COMPLAINT: Post-bariatric surgery follow-up.     HISTORY OF PRESENT ILLNESS:      7/10/2025    10:15 AM   Questions Regarding Prior Weight Loss Surgery Reviewed With Patient   I had the following weight loss procedure Mylene-en-y Gastric Bypass   What year was your surgery? 2009   How has your weight changed since your last visit? I have gained weight   Do you currently have any of the following Sleep Apnea    Heartburn, acid reflux, or GERD (acid reflux disease)?    Hyperlipidemia (high cholesterol, triglycerides)?   Do you have any concerns today? No       Plan  Start Wegovy 0.25mg once weekly for 4 weeks, then increase to 0.5mg once weekly. Consider Zepbound and Saxenda as needed. If not covered will start topiramate.   Start omeprazole 20mg daily for GERD symtpoms. If not improving could consider endoscopy for evaluation. Continue to avoid cigaretes and NSAIDs to help reduce risk of gastritis or stomach ulcers which you are at a higher risk for given your surgery history.   Goals we discussed today:   Track all eating prior to meeting with dietician   Follow up with Abena in 3 months   Dietician appointment to be scheduled asa   Keep up the excellent work!      Anti-obesity medication started today for this patient   WEGOVY  No history of pancreatitis   No personal or family history of medullary thyroid carcinoma  No personal or family history of Multiple Endocrine Neoplasia Type 2  .     Potential anti-obesity medications for this patient the future if there are issues with cost or side effects  TOPIRAMATE  No history of kidney stones  No history of glaucoma   No issues with memory  No chronic kidney disease   .  NALTREXONE  Did not discuss, could consider in the future   Would see synergistic effect with bupropion that patient is already taking  No history of liver disease  No current opioid use   Caution is needed with this medication due to back pain, recently requiring opioid meds to recover from back surgery. Could consider with caution   .  METFORMIN  Did not discuss, could consider in the future   Has diagnosis of prediabetes   Is concurrently taking the following medication(s) associated with weight gain: mirtazapine, seroquel, gabapentin   No history of chronic kidney disease  GFR >45  .        Contraindicated Weight loss medications for this patient   Phentermine- contraindicated due to history of substance use         Pertinent HPI  Rygb, ventral hernia, and hiatal hernia repairwith Dr. Jordan in 2011  Pre -RYGB highest weight (in 2010) 303.6lbs  Ovarian cancer age 26 s/p right oophorectomy  Cholecycystectomy and appendectomy in 1997  Follicular carcinoma s/p thyroidectomy 2021    History of methamphetamine addiction- used until 2016, sober until July 2024 when she had a relapse, graduating treatment this month (July 2025).        Re-establishing with our clinic today due to weight gain.       Marco weight post surgery- 140lbs, recalls she felt good at this weight but at times wondered if she was too low.   Gained to 150lbs, maintained around here for 10 years.   Gradual weight gain since, gained up to 235lbs which was highest weight in life post  surgery- was at this 1 year ago.   Has since lost to 223lbs with portion reduction, some lessened appetite following a back surgery feb 2025.       Comorbidities associated with weight gain include prediabetes, history of ovarian cancer (s/p right oophorectomy), dyslipidemia. Follicular thyroid cancer s/p thyroidectomy, managed with levothyroxine. History of methamphetamine addiction- almost 1 year since relapse, close to graduating treatment for this. Anxiety and depression managed with wellbutrin. Issues with falling asleep- managed with seroquel and mirtazapine.   Tobacco use history- 4 to 5 cigarettes daily since age 18, will quit for a year here and there since- last quit smoking 4 months ago, sometimes using nicotine patches.   Has chronic back pain, uses mostly tylenol for management though will use ibuprofen sparingly for this    Dealing with rash under the abdominal skin folds- well managed with barrier cream and corn starch baby powder.     Motivators for weight loss include improve health, be around for her 9 grand kids.     She is interested in starting a medication as a tool for working towards sustainable weight loss.    Regarding eating patterns and diet, she typically eats 3 meals a day- tries to minimize snacks. Craves fast food, increase in sweet cravings in the last year. Is able to get full- will experience vomiting if overeating since rygb. Struggles stay full until next meal- often will feel hungry again 1 hour after meal. Does at times struggle with portion control. Does experience food noise. Does at times experience emotional eating (anxiety). Sometimes will wake up in the middle of the night feeling hungry and will have to eat to go back to sleep- will eat jerrica peanut butter cups, crackers, other candy- this has been occurring a few times a week for the last 6 months. Does at times experience a loss of control around eating. Denies purging to compensate for weight gain.     Eats out/ gets  take out twice a week, though for the last 2 weeks has not gone out to eat or bought gas station food as she has been wanting to work on her health. Drinks low carb monster energy drink in the morning, circl water bottle the rest of the day. Diet coke occasionally- maybe 3 cans in a week. Was drinking regular coke up until 3 months ago, has since cut this out. No ETOH.       Regarding activity, was off work for a while due to back surgery recovery. 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 Hopes to join a gym that has a swimming pool to help with exercise given her back pain. Likes walking but is limited in this due to back pain.       Past/ Current AOMs   None         Vitamins/Labs: Currently taking a multivitamin, vitamin d, calcium, iron supplement. Also taking a fiber supplement.     Post op labs completed 6/30/25 -dyslipidemia, prediabetes. Vitamins within non-concerning ranges. B12 elevated.   Iron deficiency- In the past- improved with regular iron supplementation.     GERD symptoms: worsened heartburn in the last 6 months- symptoms are occurring once daily after eating- seems to be most predominant in the afternoon. Managed with tums prn.     Nausea : denies     Vomiting : denies unless she overeats.     Dysphagia:  if not chewing food carefully. When being intentional about chewing thoroughly notices this is not an issue.     Dumping syndrome: denies any sensitivity or specific reactions to eating sugary foods- denies diarrhea, sweating, shakiness.     Weight History:      7/10/2025    10:15 AM   --   What is your highest lifetime weight? 320   What is your lowest weight since surgery? (In pounds) 145        Weight: 101.2 kg (223 lb) (218 lbs a month ago)                 7/10/2025    10:15 AM   Questions Regarding Co-Morbidities and Health Concerns Reviewed With Patient   Pre-diabetes Improved   Diabetes II Never   High Blood  Pressure Never   High cholesterol Improved   Heartburn/Reflux Improved   Sleep apnea Stayed the same   PCOS Improved   Back pain Stayed the same   Joint pain Stayed the same   Lower leg swelling Stayed the same           7/10/2025    10:15 AM   Eating Habits   How many meals do you eat per day? 3   Do you snack between meals? Sometimes   How much food are you eating at each meal? 1/2 cup to 1 cup   Are you able to separate your meals and liquids by at least 30 minutes? Sometimes   Are you able to avoid liquid calories? Sometimes           7/10/2025    10:15 AM   Exercise Questions Reviewed With Patient   How often do you exercise? 1 to 2 times per week   What is the duration of your exercise (in minutes)? 10 Minutes   What types of exercise do you do? walking   What keeps you from being more active? I am as active as I can possbily be    Pain    I have just had surgery on one or more of my joints    My ability to walk or move around is limited    Too tired    Worried people will look at me       Social History:      7/10/2025    10:15 AM   --   Are you smoking? No   Are you drinking alcohol? No   Quit smoking 4 months ago.       Medications:  Current Outpatient Medications   Medication Sig Dispense Refill    nicotine (NICODERM CQ) 7 MG/24HR 24 hr patch Place 1 patch onto the skin every 24 hours.      nicotine polacrilex (NICORETTE) 4 MG gum Take by mouth.      omeprazole (PRILOSEC) 20 MG DR capsule Take 1 capsule (20 mg) by mouth daily. 90 capsule 3    semaglutide-weight management (WEGOVY) 0.25 MG/0.5ML pen Inject 0.5 mLs (0.25 mg) subcutaneously once a week. For 4 weeks 2 mL 0    Semaglutide-Weight Management (WEGOVY) 0.5 MG/0.5ML pen Inject 0.5 mg subcutaneously once a week. After completing 4 weeks of 0.25mg dose 2 mL 1    acetaminophen (TYLENOL) 500 MG tablet Take 2 tablets (1,000 mg) by mouth every 8 hours as needed for mild pain. 90 tablet 0    buPROPion (WELLBUTRIN XL) 150 MG 24 hr tablet Take 1 tablet by  mouth daily.      busPIRone (BUSPAR) 10 MG tablet Take 10 mg by mouth 3 times daily.      calcitonin, salmon, (MIACALCIN) 200 UNIT/ACT nasal spray Spray 1 spray into one nostril alternating nostrils daily.      hydrOXYzine HCl (ATARAX) 50 MG tablet Take 50 mg by mouth every 8 hours as needed.      ibuprofen (ADVIL/MOTRIN) 200 MG tablet Take 3 tablets (600 mg) by mouth every 6 hours as needed for pain. 90 tablet 0    levothyroxine (SYNTHROID/LEVOTHROID) 150 MCG tablet TAKE 1 TABLET(150 MCG) BY MOUTH DAILY 30 tablet 0    methocarbamol (ROBAXIN) 500 MG tablet Take 1 tablet (500 mg) by mouth 3 times daily as needed for muscle spasms      mirtazapine (REMERON) 15 MG tablet Take 15 mg by mouth at bedtime.      Multiple Vitamins-Minerals (TAB-A-DAISY) TABS Take 1 tablet by mouth daily      propranolol (INDERAL) 10 MG tablet Take 10 mg by mouth 3 times daily as needed.      QUEtiapine (SEROQUEL) 25 MG tablet Take 1-2 tablets by mouth at bedtime As Needed.*      romosozumab-aqqg (EVENITY) 105 MG/1.17ML injection Inject 210 mg subcutaneously every 28 days.      sulfamethoxazole-trimethoprim (BACTRIM DS) 800-160 MG tablet Take 1 tablet by mouth 2 times daily. 10 tablet 0    tiZANidine (ZANAFLEX) 2 MG tablet Take 2-4 mg by mouth 3 times daily as needed for muscle spasms.      vitamin D2 (ERGOCALCIFEROL) 05574 units (1250 mcg) capsule Take 50,000 Units by mouth every 7 days.       No current facility-administered medications for this visit.         7/10/2025    10:15 AM   --   Do you avoid NSAIDs such as (Ibuprofen, Aleve, Naproxen, Advil)? Yes       ROS:  GI:       7/10/2025    10:15 AM   --   Vomiting No   Diarrhea No   Constipation No   Swallowing trouble No   Abdominal pain No   Heartburn Yes     Skin:       7/10/2025    10:15 AM   BAR RBS ROS - SKIN   Rash in skin folds Yes     Psych:       7/10/2025    10:15 AM   --   Depression Yes   Anxiety Yes     Female Only:       7/10/2025    10:15 AM   BAR RBS ROS -    Female only  "Post-menopausal   Stress urinary incontinence No     Anti-obesity medication ROS:    HEENT  Hx of glaucoma: No    Cardiovascular  CAD:No  HTN:No      Gastrointestinal  GERD:Yes  Constipation:No  Liver Dz:No  H/O Pancreatitis:No    Psychiatric  Bipolar: No  Anxiety:Yes  Depression:Yes  Suicidal Ideation: No  History of alcohol/drug abuse: Yes  Hx of eating disorder:No    Endocrine  Personal or family hx of MTC or MEN2:No  Diabetes/prediabetes: prediabetes     Neurologic:  Hx of seizures: No  Hx of migraines: at times  Memory Impairment: No  CVA history: No        History of kidney stones: No  Kidney disease: No  Current birth control: post menopausal    Taking Opioid/Narcotic: No        PHYSICAL EXAM:  Objective    Ht 1.6 m (5' 3\")   Wt 101.2 kg (223 lb)   LMP 06/30/2011   BMI 39.50 kg/m    Vitals - Patient Reported  Pain Score: No Pain (0)      Vitals:  No vitals were obtained today due to virtual visit.    Phone visit- no physical exam.       Sincerely,    Abena Kimble PA-C     The longitudinal plan of care for the diagnosis(es)/condition(s) as documented were addressed during this visit. Due to the added complexity in care, I will continue to support Barbara in the subsequent management and with ongoing continuity of care.   "

## 2025-07-10 ENCOUNTER — VIRTUAL VISIT (OUTPATIENT)
Dept: ENDOCRINOLOGY | Facility: CLINIC | Age: 55
End: 2025-07-10
Payer: COMMERCIAL

## 2025-07-10 VITALS — WEIGHT: 223 LBS | BODY MASS INDEX: 39.51 KG/M2 | HEIGHT: 63 IN

## 2025-07-10 DIAGNOSIS — R73.03 PREDIABETES: ICD-10-CM

## 2025-07-10 DIAGNOSIS — E89.0 HYPOTHYROIDISM, POSTSURGICAL: ICD-10-CM

## 2025-07-10 DIAGNOSIS — E66.812 CLASS 2 SEVERE OBESITY WITH SERIOUS COMORBIDITY AND BODY MASS INDEX (BMI) OF 39.0 TO 39.9 IN ADULT, UNSPECIFIED OBESITY TYPE (H): Primary | ICD-10-CM

## 2025-07-10 DIAGNOSIS — Z98.84 HISTORY OF ROUX-EN-Y GASTRIC BYPASS: ICD-10-CM

## 2025-07-10 DIAGNOSIS — E66.01 CLASS 2 SEVERE OBESITY WITH SERIOUS COMORBIDITY AND BODY MASS INDEX (BMI) OF 39.0 TO 39.9 IN ADULT, UNSPECIFIED OBESITY TYPE (H): Primary | ICD-10-CM

## 2025-07-10 DIAGNOSIS — K21.9 GASTROESOPHAGEAL REFLUX DISEASE, UNSPECIFIED WHETHER ESOPHAGITIS PRESENT: ICD-10-CM

## 2025-07-10 PROBLEM — M54.16 LUMBAR RADICULOPATHY: Status: ACTIVE | Noted: 2025-02-18

## 2025-07-10 PROCEDURE — 98011 SYNCH AUDIO-ONLY NEW HIGH 60: CPT

## 2025-07-10 PROCEDURE — 1126F AMNT PAIN NOTED NONE PRSNT: CPT | Mod: 93

## 2025-07-10 PROCEDURE — G2211 COMPLEX E/M VISIT ADD ON: HCPCS | Mod: 93

## 2025-07-10 RX ORDER — OMEPRAZOLE 20 MG/1
20 CAPSULE, DELAYED RELEASE ORAL DAILY
Qty: 90 CAPSULE | Refills: 3 | Status: SHIPPED | OUTPATIENT
Start: 2025-07-10

## 2025-07-10 RX ORDER — SEMAGLUTIDE 0.5 MG/.5ML
0.5 INJECTION, SOLUTION SUBCUTANEOUS WEEKLY
Qty: 2 ML | Refills: 1 | Status: SHIPPED | OUTPATIENT
Start: 2025-07-10

## 2025-07-10 RX ORDER — QUETIAPINE FUMARATE 25 MG/1
TABLET, FILM COATED ORAL
COMMUNITY

## 2025-07-10 ASSESSMENT — SLEEP AND FATIGUE QUESTIONNAIRES
HOW LIKELY ARE YOU TO NOD OFF OR FALL ASLEEP WHILE LYING DOWN TO REST IN THE AFTERNOON WHEN CIRCUMSTANCES PERMIT: HIGH CHANCE OF DOZING
HOW LIKELY ARE YOU TO NOD OFF OR FALL ASLEEP WHILE WATCHING TV: HIGH CHANCE OF DOZING
HOW LIKELY ARE YOU TO NOD OFF OR FALL ASLEEP WHILE SITTING AND TALKING TO SOMEONE: WOULD NEVER DOZE
HOW LIKELY ARE YOU TO NOD OFF OR FALL ASLEEP WHILE SITTING QUIETLY AFTER LUNCH WITHOUT ALCOHOL: MODERATE CHANCE OF DOZING
HOW LIKELY ARE YOU TO NOD OFF OR FALL ASLEEP IN A CAR, WHILE STOPPED FOR A FEW MINUTES IN TRAFFIC: WOULD NEVER DOZE
HOW LIKELY ARE YOU TO NOD OFF OR FALL ASLEEP WHEN YOU ARE A PASSENGER IN A CAR FOR AN HOUR WITHOUT A BREAK: HIGH CHANCE OF DOZING
HOW LIKELY ARE YOU TO NOD OFF OR FALL ASLEEP WHILE SITTING AND READING: MODERATE CHANCE OF DOZING
HOW LIKELY ARE YOU TO NOD OFF OR FALL ASLEEP WHILE SITTING INACTIVE IN A PUBLIC PLACE: SLIGHT CHANCE OF DOZING

## 2025-07-10 ASSESSMENT — PAIN SCALES - GENERAL: PAINLEVEL_OUTOF10: NO PAIN (0)

## 2025-07-10 NOTE — PATIENT INSTRUCTIONS
"Thank you for allowing us the privilege of caring for you. We hope we provided you with the excellent service you deserve.   Please let us know if there is anything else we can do for you so that we can be sure you are completely satisfied with your care experience.    To ensure the quality of our services you may be receiving a patient satisfaction survey from an independent patient satisfaction monitoring company.    The greatest compliment you can give is a \"Likely to Recommend\"    Your visit was with Abena Kimble PA-C today.    Instructions per today's visit:     Branden Camacho, it was great to visit with you today.  Here is a review of our visit.  If our clinic scheduler is not able to reach you please call [phone] to schedule your next appointments.      Plan  Start Wegovy 0.25mg once weekly for 4 weeks, then increase to 0.5mg once weekly. Consider Zepbound and Saxenda as needed. If not covered will start topiramate.   Start omeprazole 20mg daily for GERD symtpoms. If not improving could consider endoscopy for evaluation. Continue to avoid cigaretes and NSAIDs to help reduce risk of gastritis or stomach ulcers which you are at a higher risk for given your surgery history.   Goals we discussed today:   Track all eating prior to meeting with dietician   Follow up with Abena in 3 months   Dietician appointment to be scheduled asap   Keep up the excellent work!       Information about Video Visits with [facility]: video visit information  _________________________________________________________________________________________________________________________________________________________  If you are asked by your clinic team to have your blood pressure checked:  [facility] do offer several locations for blood pressure checks. Please follow the below link to schedule an appointment. Scheduling an appointment at the pharmacy for a blood pressure check is now preferred.    Appointment Plus " (Expect Labs)  _________________________________________________________________________________________________________________________________________________________  Important contact and scheduling information:  Please call our contact center at [phone] to schedule your next appointments.  To find a lab location near you, please call [phone].  For any nursing questions or concerns call Racheal Long LPN at [phone] or Jessica Franco RN at [phone]  Please call during clinic hours Monday through Friday 8:00a - 4:00p if you have questions or you can contact us via [employer] at anytime and we will reply during clinic hours.    Lab results will be communicated through My Chart or letter (if My Chart not used). Please call the clinic if you have not received communication after 1 week or if you have any questions.?  Clinic Fax: [phone]    [facility] with [facility] !  Virtual Sessions are Available through [facility]    To learn more, call to schedule an appointment: [phone]     What is Health Coaching?  Do you know what you are supposed to do, but you just aren't doing it?  Then, HEALTH COACHING may help you!   Get unstuck and move forward with the support of a professionally trained [facility] (National Board-Certified Health and ) who uses evidence-based approaches to help you move forward with healthy lifestyle changes in the areas of weight loss, stress management and overall well-being.    Health Coaches help you identify goals that will work best for you. Health Coaches provide support and encouragement with overcoming barriers and help you to find inspiration and motivation to lead a healthy lifestyle.    Health Coaching 3-Pack; Three, 30-minute Health Coaching Visits, for $135  Health Coaching 6-Pack; Six, 30-minute Health coaching visits, for $250  Visits are done virtually (phone or video)  This is a self pay service; we do not  accept insurance for jayson coaching.    ____________________________________________________________________  [facility]  Healthy Lifestyle Group    Healthy Lifestyle Group  This is a 60 minute virtual coaching group for those who want to lead a healthier lifestyle. Come together to set goals and overcome barriers in a supportive group environment. We will address the four pillars of health--nutrition, exercise, sleep and emotional well-being.  This group is highly recommended for those who are participating in the 24 week Healthy Lifestyle Plan and our Health Coaching sessions.    WHEN: This group meets the first Friday of the month, 12:30 PM - 1:30 PM online, via a zoom meeting.      FACILITATOR: Led by National Board Certified Health and , Leonora Masters [facility].    TO REGISTER: Please call the Call Center at [phone] to register. You will get an appointment to attend in [facility]. Fifteen minutes prior to the meeting, complete the e-check in and you will get the link to join the meeting.  There is no charge to attend this group and space is limited.      _________________________________________________________________________________________________________________________________________________________  __________  [place] [facility] Get Moving Program  Our team of physical therapists is trained to help you understand and take control of your condition. They will perform a thorough evaluation to determine your ability for activity and develop a customized plan to fit your goals and physical ability.  Scheduling: Unsure if the Get Moving program is right for you? Discuss the program with your medical provider or diabetes educator. You can also call us at [phone] to ask questions or schedule an appointment.   [facility] Get Moving  Program  ____________________________________________________________________________________________________________________________________________________________________________        Thank you,   [facility] Weight Management Team

## 2025-07-10 NOTE — LETTER
7/10/2025       RE: Barbara Camacho  [address] [place] [address]     Dear Colleague,    Thank you for referring your patient, Barbara Camacho, to the [facility] [place] [facility]. Please see a copy of my visit note below.    New Re-establish Bariatric Surgery Note    RE: Barbara Camacho  MR#: [id number]  : 1970  VISIT DATE: Jul 10, 2025      Dear No Ref-Primary, Physician,    I had the pleasure of seeing your patient, Barbara Camacho, in my post-bariatric surgery assessment clinic.    Assessment & Plan  Problem List Items Addressed This Visit       Hypothyroidism, postsurgical    Relevant Medications    omeprazole (PRILOSEC) 20 MG DR capsule    semaglutide-weight management (WEGOVY) 0.25 MG/0.5ML pen    Semaglutide-Weight Management (WEGOVY) 0.5 MG/0.5ML pen    Other Relevant Orders    Med Therapy Management Referral    Adult Bariatrics and Weight Management Clinic Follow-Up Order    Adult Bariatrics and Weight Management Clinic Follow-Up Order    Prediabetes    Relevant Medications    omeprazole (PRILOSEC) 20 MG DR capsule    semaglutide-weight management (WEGOVY) 0.25 MG/0.5ML pen    Semaglutide-Weight Management (WEGOVY) 0.5 MG/0.5ML pen    Other Relevant Orders    Med Therapy Management Referral    Adult Bariatrics and Weight Management Clinic Follow-Up Order    Adult Bariatrics and Weight Management Clinic Follow-Up Order     Other Visit Diagnoses         Class 2 severe obesity with serious comorbidity and body mass index (BMI) of 39.0 to 39.9 in adult, unspecified obesity type (H)    -  Primary    Relevant Medications    omeprazole (PRILOSEC) 20 MG DR capsule    semaglutide-weight management (WEGOVY) 0.25 MG/0.5ML pen    Semaglutide-Weight Management (WEGOVY) 0.5 MG/0.5ML pen    Other Relevant Orders    Med Therapy Management Referral    Adult Bariatrics and Weight Management Clinic Follow-Up Order    Adult Bariatrics and  Weight Management Clinic Follow-Up Order      History of Mylene-en-Y gastric bypass        Relevant Medications    omeprazole (PRILOSEC) 20 MG DR capsule    semaglutide-weight management (WEGOVY) 0.25 MG/0.5ML pen    Semaglutide-Weight Management (WEGOVY) 0.5 MG/0.5ML pen    Other Relevant Orders    Med Therapy Management Referral    Adult Bariatrics and Weight Management Clinic Follow-Up Order    Adult Bariatrics and Weight Management Clinic Follow-Up Order      Gastroesophageal reflux disease, unspecified whether esophagitis present        Relevant Medications    omeprazole (PRILOSEC) 20 MG DR capsule    semaglutide-weight management (WEGOVY) 0.25 MG/0.5ML pen    Semaglutide-Weight Management (WEGOVY) 0.5 MG/0.5ML pen    Other Relevant Orders    Med Therapy Management Referral    Adult Bariatrics and Weight Management Clinic Follow-Up Order    Adult Bariatrics and Weight Management Clinic Follow-Up Order               57 minutes spent by me on the date of the encounter doing chart review, history and exam, documentation and further activities per the note    CHIEF COMPLAINT: Post-bariatric surgery follow-up.     HISTORY OF PRESENT ILLNESS:      7/10/2025    10:15 AM   Questions Regarding Prior Weight Loss Surgery Reviewed With Patient   I had the following weight loss procedure Mylene-en-y Gastric Bypass   What year was your surgery? 2009   How has your weight changed since your last visit? I have gained weight   Do you currently have any of the following Sleep Apnea    Heartburn, acid reflux, or GERD (acid reflux disease)?    Hyperlipidemia (high cholesterol, triglycerides)?   Do you have any concerns today? No       Plan  Start Wegovy 0.25mg once weekly for 4 weeks, then increase to 0.5mg once weekly. Consider Zepbound and Saxenda as needed. If not covered will start topiramate.   Start omeprazole 20mg daily for GERD symtpoms. If not improving could consider endoscopy for evaluation. Continue to avoid cigaretes and  NSAIDs to help reduce risk of gastritis or stomach ulcers which you are at a higher risk for given your surgery history.   Goals we discussed today:   Track all eating prior to meeting with dietician   Follow up with Abena in 3 months   Dietician appointment to be scheduled asap   Keep up the excellent work!     Anti-obesity medication started today for this patient   WEGOVY  No history of pancreatitis   No personal or family history of medullary thyroid carcinoma  No personal or family history of Multiple Endocrine Neoplasia Type 2  .     Potential anti-obesity medications for this patient the future if there are issues with cost or side effects  TOPIRAMATE  No history of kidney stones  No history of glaucoma   No issues with memory  No chronic kidney disease   .  NALTREXONE  Did not discuss, could consider in the future   Would see synergistic effect with bupropion that patient is already taking  No history of liver disease  No current opioid use   Caution is needed with this medication due to back pain, recently requiring opioid meds to recover from back surgery. Could consider with caution   .  METFORMIN  Did not discuss, could consider in the future   Has diagnosis of prediabetes   Is concurrently taking the following medication(s) associated with weight gain: mirtazapine, seroquel, gabapentin   No history of chronic kidney disease  GFR >45  .        Contraindicated Weight loss medications for this patient   Phentermine- contraindicated due to history of substance use         Pertinent HPI  Rygb, ventral hernia, and hiatal hernia repairwith Dr. Jordna in 2011  Pre -RYGB highest weight (in 2010) 303.6lbs  Ovarian cancer age 26 s/p right oophorectomy  Cholecycystectomy and appendectomy in 1997  Follicular carcinoma s/p thyroidectomy 2021    History of methamphetamine addiction- used until 2016, sober until July 2024 when she had a relapse, graduating treatment this month (July 2025).        Re-establishing  with our clinic today due to weight gain.       Marco weight post surgery- 140lbs, recalls she felt good at this weight but at times wondered if she was too low.   Gained to 150lbs, maintained around here for 10 years.   Gradual weight gain since, gained up to 235lbs which was highest weight in life post surgery- was at this 1 year ago.   Has since lost to 223lbs with portion reduction, some lessened appetite following a back surgery feb 2025.       Comorbidities associated with weight gain include prediabetes, history of ovarian cancer (s/p right oophorectomy), dyslipidemia. Follicular thyroid cancer s/p thyroidectomy, managed with levothyroxine. History of methamphetamine addiction- almost 1 year since relapse, close to graduating treatment for this. Anxiety and depression managed with wellbutrin. Issues with falling asleep- managed with seroquel and mirtazapine.   Tobacco use history- 4 to 5 cigarettes daily since age 18, will quit for a year here and there since- last quit smoking 4 months ago, sometimes using nicotine patches.   Has chronic back pain, uses mostly tylenol for management though will use ibuprofen sparingly for this    Dealing with rash under the abdominal skin folds- well managed with barrier cream and corn starch baby powder.     Motivators for weight loss include improve health, be around for her 9 grand kids.     She is interested in starting a medication as a tool for working towards sustainable weight loss.    Regarding eating patterns and diet, she typically eats 3 meals a day- tries to minimize snacks. Craves fast food, increase in sweet cravings in the last year. Is able to get full- will experience vomiting if overeating since rygb. Struggles stay full until next meal- often will feel hungry again 1 hour after meal. Does at times struggle with portion control. Does experience food noise. Does at times experience emotional eating (anxiety). Sometimes will wake up in the middle of the  night feeling hungry and will have to eat to go back to sleep- will eat jerrica peanut butter cups, crackers, other candy- this has been occurring a few times a week for the last 6 months. Does at times experience a loss of control around eating. Denies purging to compensate for weight gain.     Eats out/ gets take out twice a week, though for the last 2 weeks has not gone out to eat or bought gas station food as she has been wanting to work on her health. Drinks low carb monster energy drink in the morning, circl water bottle the rest of the day. Diet coke occasionally- maybe 3 cans in a week. Was drinking regular coke up until 3 months ago, has since cut this out. No ETOH.       Regarding activity, was off work for a while due to back surgery recovery. 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 Hopes to join a gym that has a swimming pool to help with exercise given her back pain. Likes walking but is limited in this due to back pain.       Past/ Current AOMs   None         Vitamins/Labs: Currently taking a multivitamin, vitamin d, calcium, iron supplement. Also taking a fiber supplement.     Post op labs completed 6/30/25 -dyslipidemia, prediabetes. Vitamins within non-concerning ranges. B12 elevated.   Iron deficiency- In the past- improved with regular iron supplementation.     GERD symptoms: worsened heartburn in the last 6 months- symptoms are occurring once daily after eating- seems to be most predominant in the afternoon. Managed with tums prn.     Nausea : denies     Vomiting : denies unless she overeats.     Dysphagia:  if not chewing food carefully. When being intentional about chewing thoroughly notices this is not an issue.     Dumping syndrome: denies any sensitivity or specific reactions to eating sugary foods- denies diarrhea, sweating, shakiness.     Weight History:      7/10/2025    10:15 AM   --   What is your highest  lifetime weight? 320   What is your lowest weight since surgery? (In pounds) 145        Weight: 101.2 kg (223 lb) (218 lbs a month ago)                 7/10/2025    10:15 AM   Questions Regarding Co-Morbidities and Health Concerns Reviewed With Patient   Pre-diabetes Improved   Diabetes II Never   High Blood Pressure Never   High cholesterol Improved   Heartburn/Reflux Improved   Sleep apnea Stayed the same   PCOS Improved   Back pain Stayed the same   Joint pain Stayed the same   Lower leg swelling Stayed the same           7/10/2025    10:15 AM   Eating Habits   How many meals do you eat per day? 3   Do you snack between meals? Sometimes   How much food are you eating at each meal? 1/2 cup to 1 cup   Are you able to separate your meals and liquids by at least 30 minutes? Sometimes   Are you able to avoid liquid calories? Sometimes           7/10/2025    10:15 AM   Exercise Questions Reviewed With Patient   How often do you exercise? 1 to 2 times per week   What is the duration of your exercise (in minutes)? 10 Minutes   What types of exercise do you do? walking   What keeps you from being more active? I am as active as I can possbily be    Pain    I have just had surgery on one or more of my joints    My ability to walk or move around is limited    Too tired    Worried people will look at me       Social History:      7/10/2025    10:15 AM   --   Are you smoking? No   Are you drinking alcohol? No   Quit smoking 4 months ago.       Medications:  Current Outpatient Medications   Medication Sig Dispense Refill     nicotine (NICODERM CQ) 7 MG/24HR 24 hr patch Place 1 patch onto the skin every 24 hours.       nicotine polacrilex (NICORETTE) 4 MG gum Take by mouth.       omeprazole (PRILOSEC) 20 MG DR capsule Take 1 capsule (20 mg) by mouth daily. 90 capsule 3     semaglutide-weight management (WEGOVY) 0.25 MG/0.5ML pen Inject 0.5 mLs (0.25 mg) subcutaneously once a week. For 4 weeks 2 mL 0     Semaglutide-Weight  Management (WEGOVY) 0.5 MG/0.5ML pen Inject 0.5 mg subcutaneously once a week. After completing 4 weeks of 0.25mg dose 2 mL 1     acetaminophen (TYLENOL) 500 MG tablet Take 2 tablets (1,000 mg) by mouth every 8 hours as needed for mild pain. 90 tablet 0     buPROPion (WELLBUTRIN XL) 150 MG 24 hr tablet Take 1 tablet by mouth daily.       busPIRone (BUSPAR) 10 MG tablet Take 10 mg by mouth 3 times daily.       calcitonin, salmon, (MIACALCIN) 200 UNIT/ACT nasal spray Spray 1 spray into one nostril alternating nostrils daily.       hydrOXYzine HCl (ATARAX) 50 MG tablet Take 50 mg by mouth every 8 hours as needed.       ibuprofen (ADVIL/MOTRIN) 200 MG tablet Take 3 tablets (600 mg) by mouth every 6 hours as needed for pain. 90 tablet 0     levothyroxine (SYNTHROID/LEVOTHROID) 150 MCG tablet TAKE 1 TABLET(150 MCG) BY MOUTH DAILY 30 tablet 0     methocarbamol (ROBAXIN) 500 MG tablet Take 1 tablet (500 mg) by mouth 3 times daily as needed for muscle spasms       mirtazapine (REMERON) 15 MG tablet Take 15 mg by mouth at bedtime.       Multiple Vitamins-Minerals (TAB-A-DAISY) TABS Take 1 tablet by mouth daily       propranolol (INDERAL) 10 MG tablet Take 10 mg by mouth 3 times daily as needed.       QUEtiapine (SEROQUEL) 25 MG tablet Take 1-2 tablets by mouth at bedtime As Needed.*       romosozumab-aqqg (EVENITY) 105 MG/1.17ML injection Inject 210 mg subcutaneously every 28 days.       sulfamethoxazole-trimethoprim (BACTRIM DS) 800-160 MG tablet Take 1 tablet by mouth 2 times daily. 10 tablet 0     tiZANidine (ZANAFLEX) 2 MG tablet Take 2-4 mg by mouth 3 times daily as needed for muscle spasms.       vitamin D2 (ERGOCALCIFEROL) 76965 units (1250 mcg) capsule Take 50,000 Units by mouth every 7 days.       No current facility-administered medications for this visit.         7/10/2025    10:15 AM   --   Do you avoid NSAIDs such as (Ibuprofen, Aleve, Naproxen, Advil)? Yes       ROS:  GI:       7/10/2025    10:15 AM   --  "  Vomiting No   Diarrhea No   Constipation No   Swallowing trouble No   Abdominal pain No   Heartburn Yes     Skin:       7/10/2025    10:15 AM   BAR RBS ROS - SKIN   Rash in skin folds Yes     Psych:       7/10/2025    10:15 AM   --   Depression Yes   Anxiety Yes     Female Only:       7/10/2025    10:15 AM   BAR RBS ROS -    Female only Post-menopausal   Stress urinary incontinence No     Anti-obesity medication ROS:    HEENT  Hx of glaucoma: No    Cardiovascular  CAD:No  HTN:No      Gastrointestinal  GERD:Yes  Constipation:No  Liver Dz:No  H/O Pancreatitis:No    Psychiatric  Bipolar: No  Anxiety:Yes  Depression:Yes  Suicidal Ideation: No  History of alcohol/drug abuse: Yes  Hx of eating disorder:No    Endocrine  Personal or family hx of MTC or MEN2:No  Diabetes/prediabetes: prediabetes     Neurologic:  Hx of seizures: No  Hx of migraines: at times  Memory Impairment: No  CVA history: No        History of kidney stones: No  Kidney disease: No  Current birth control: post menopausal    Taking Opioid/Narcotic: No        PHYSICAL EXAM:  Objective   Ht 1.6 m (5' 3\")   Wt 101.2 kg (223 lb)   LMP 06/30/2011   BMI 39.50 kg/m    Vitals - Patient Reported  Pain Score: No Pain (0)      Vitals:  No vitals were obtained today due to virtual visit.    Phone visit- no physical exam.       Sincerely,    Abena Kimble PA-C     The longitudinal plan of care for the diagnosis(es)/condition(s) as documented were addressed during this visit. Due to the added complexity in care, I will continue to support Barbara in the subsequent management and with ongoing continuity of care.     Again, thank you for allowing me to participate in the care of your patient.      Sincerely,    Abena Kimble PA-C    "

## 2025-07-11 ENCOUNTER — TELEPHONE (OUTPATIENT)
Dept: ENDOCRINOLOGY | Facility: CLINIC | Age: 55
End: 2025-07-11

## 2025-07-15 DIAGNOSIS — E66.01 CLASS 2 SEVERE OBESITY WITH SERIOUS COMORBIDITY AND BODY MASS INDEX (BMI) OF 39.0 TO 39.9 IN ADULT, UNSPECIFIED OBESITY TYPE (H): Primary | ICD-10-CM

## 2025-07-15 DIAGNOSIS — E66.812 CLASS 2 SEVERE OBESITY WITH SERIOUS COMORBIDITY AND BODY MASS INDEX (BMI) OF 39.0 TO 39.9 IN ADULT, UNSPECIFIED OBESITY TYPE (H): Primary | ICD-10-CM

## 2025-07-15 DIAGNOSIS — R73.03 PREDIABETES: ICD-10-CM

## 2025-07-15 RX ORDER — TOPIRAMATE 25 MG/1
TABLET, FILM COATED ORAL
Qty: 90 TABLET | Refills: 3 | Status: SHIPPED | OUTPATIENT
Start: 2025-07-15

## 2025-07-15 NOTE — PROGRESS NOTES
"Video-Visit Details    Type of service:  Video Visit    Video Start Time: 9:25 AM   Video End Time: 9:52 AM    Originating Location (pt. Location): Other parked car    Distant Location (provider location):  Offsite (providers home) [facility]     Platform used for Video Visit: FreeCharge    Nutrition Assessment  Reason For Visit:  Barbara Camacho is a 54 year old female presents today for new re-establish nutrition visit. Pt with history of RNYGB in 2009.  Patient referred by NOAH Carranza on July 10, 2025.    Patient with Co-morbidities of obesity including:  Type II DM no  Renal Failure no  Sleep apnea yes  Hypertension no   Dyslipidemia yes  Joint pain yes  Back pain yes  GERD yes    Anthropometrics:  Pre-op Weight: 303 lbs  Lowest weight after surgery: 140 lb   Current Weight:   Estimated body mass index is 39.5 kg/m  as calculated from the following:    Height as of 7/10/25: 1.6 m (5' 3\").    Weight as of 7/10/25: 101.2 kg (223 lb).    Weight Loss Medications: Topiramate- has not started yet    Wegovy denied until patient has been in a weight loss program for at least 6 months and has had at least 2 visits     Current Vitamins/Minerals: Currently taking a multivitamin, vitamin d, calcium, iron supplement. Also taking a fiber supplement.     Nutrition History:    Per Abena's visit: typically eats 3 meals a day- tries to minimize snacks. Craves fast food, increase in sweet cravings in the last year. Is able to get full- will experience vomiting if overeating since rygb. Struggles stay full until next meal- often will feel hungry 1 hour after previous meal. Does at times struggle with portion control. Does experience food noise. Does at times experience emotional eating (anxiety). Sometimes will wake up in the middle of the night feeling hungry and will have to eat to go back to sleep- will eat jerrica peanut butter cups, crackers, other candy- this has been occurring a few " times a week for the last 6 months. Does at times experience a loss of control around eating. Denies purging to compensate for weight gain.     Has no food allergies.  Intolerant to milk.    Patient has been able to maintain weight loss for several years after surgery. Over the past 3 years noticed gradual weight gain. Has experienced an increase in appetite and more snacking. Portions remain smaller. Does crave sugar often. To lose weight has tried to cut out bread and switched from drinking regular sugar soda to diet. Has also decreased frequency if soda consumed.      Eats a lot of fruits and vegetables. For protein will eat protein bars, egg whites 3 times per week, lactose free milk and some meat. Might not be meeting protein goal.     Snacks on hot cheetos, watermelon, strawberries and chocolate.    Rarely eats out or gets take out    Does not like water. Drinks low carb monster energy drink in the morning, 1-20oz circl water bottle the rest of the day. Diet coke occasionally- maybe 3 cans in a week. No ETOH.     Physical Activity:  Was off work for a while due to back surgery recovery. 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 Hopes to join a gym that has a swimming pool to help with exercise given her back pain. Likes walking but is limited in this due to back pain. Started PT yesterday.     Nutrition Prescription:  Grams Protein: 50-60 (minimum)  Amount of Fluid: 48-64 oz    Nutrition Diagnosis  Obesity r/t long history of positive energy balance aeb BMI >30.    Intervention  Materials/education provided on hypocaloric diet for weight loss, maintenance dietary guidelines after bariatric surgery, portion sizes, eating pace and chewing well, snacking, separation of beverages from meals, vitamin and mineral supplements after weight loss surgery, and protein needs.   Discussed goals pt hopes to accomplish in RD appointments  Discussed  "potential barriers and strategies to overcome.   Encouraged increased protein intake with snacks to help regulate blood sugars and increase satiety.   Patient demonstrates understanding.  Co-developed goals to work towards.   Provided pt with list of goals and resources below via [employer].     Expected Engagement: good    Goals:  1) Increase water intake, aim for 2 water bottles per day  2) Prioritize protein at meal and snack times, aim for 60-90 gm per day  3) Slow pace of meals by chewing food thoroughly to give time to recognize fullness cues    Protein Sources  [email or web address]     Quick/Easy Protein Sources:  Hard boiled eggs  Part-skim cheese sticks  Baby Bell cheese rounds  Low-fat/low-sugar Greek yogurt  Low-fat cottage cheese  Lean deli meat (chicken/turkey/ham)  Roasted chickpeas or lentils  Nuts   Turkey meat stick  Protein shake/bar  \"P3\" snack (cheese, nuts, deli meat)  [employer]'s \"Protein Bread\"   \"Egglife\" egg white wrap    Tuna/salmon can/packet   Edamame  Home made protein balls    Carbohydrates  [email or web address]     Mindful Eating  [email or web address]     Hunger/Satiety scale  [email or web address]     Physical hunger signs:   - Builds gradually     - Sensation of emptiness in stomach (rumbling)    - Based on biological need    - Goes away when satisfied/full     Psychological hunger signs:   - Occurs/comes on suddenly   - Felt mostly in the head (thoughts, preoccupation, etc)   - Crave specific foods    - Creates a desire to eat more despite fullness    - Independent of time last eaten     Lean protein and/or high-fiber snack foods examples:   - 2 cup popcorn   - 1 cup mixed berries   - 15 almonds, walnuts, cashews   - carrot/celery sticks and 2 tbsp low-fat ranch   - 1 hard boiled egg   - Part-skim mozzarella cheese stick   - Low-fat, low-sugar greek yogurt with 1/2 cup berries   - Med apple or pear   - sliced " bell peppers with 1/2 cup salsa   - 1/2 cup roasted chickpeas   - sliced cucumbers with vinegar    100-Calorie Snack List: [email or web address]    100 calorie sweets: Smart Sweets, Dr. Malloy's Xylitol candy, Fiber One desserts, Fit and Active 100 calorie snack sweets at [employer]; Nabis[employer] 100 calorie pre-portioned cookies, sugar-free pudding, sugar-free jello.    Healthier Sweet alternatives:            - Yasso bars    - Outshine fruit bars    - Bare snacks (crispy dried fruit)               - Frozen yogurt ice cream alternatives               - Nature s Bakery brownie alternatives               - Could consider black bean, sweet potato or avocado based brownies               - Trail mix              - Homemade or store bought mousse              - Dessert hummus with fruit              - Chocolate covered fruit/nuts               - Yogurt covered fruit/nuts    - Homemade energy balls     After Weight Loss Surgery    Why Take Supplements for Life after Weight Loss Surgery  [email or web address]     Supplements after Sleeve Gastrectomy, Gastric Bypass or Single Anastomosis Duodenal Switch  [email or web address]    Keeping Up Your Diet after Weight Loss Surgery  [email or web address]    Preventing Low Blood Sugar after Weight Loss Surgery  [email or web address]     Preventing Dumping Syndrome after Weight Loss Surgery  [email or web address]     Follow-Up: September 15 at 9 AM    Time spent with patient: 27 minutes.  Jane Solorzano, MS, RD, LD

## 2025-07-17 ENCOUNTER — VIRTUAL VISIT (OUTPATIENT)
Dept: ENDOCRINOLOGY | Facility: CLINIC | Age: 55
End: 2025-07-17
Payer: COMMERCIAL

## 2025-07-17 VITALS — WEIGHT: 220 LBS | BODY MASS INDEX: 38.97 KG/M2

## 2025-07-17 DIAGNOSIS — Z71.3 NUTRITIONAL COUNSELING: Primary | ICD-10-CM

## 2025-07-17 DIAGNOSIS — E66.09 CLASS 2 OBESITY DUE TO EXCESS CALORIES WITHOUT SERIOUS COMORBIDITY WITH BODY MASS INDEX (BMI) OF 38.0 TO 38.9 IN ADULT: ICD-10-CM

## 2025-07-17 DIAGNOSIS — E66.812 CLASS 2 OBESITY DUE TO EXCESS CALORIES WITHOUT SERIOUS COMORBIDITY WITH BODY MASS INDEX (BMI) OF 38.0 TO 38.9 IN ADULT: ICD-10-CM

## 2025-07-17 DIAGNOSIS — R73.03 PREDIABETES: ICD-10-CM

## 2025-07-17 DIAGNOSIS — Z98.84 HISTORY OF ROUX-EN-Y GASTRIC BYPASS: ICD-10-CM

## 2025-07-17 ASSESSMENT — PAIN SCALES - GENERAL: PAINLEVEL_OUTOF10: NO PAIN (0)

## 2025-07-17 NOTE — LETTER
"7/17/2025       RE: Barbara Camacho  [address] [place] [address]     Dear Colleague,    Thank you for referring your patient, Barbara Camacho, to the [facility] [place] at [facility]. Please see a copy of my visit note below.    Video-Visit Details    Type of service:  Video Visit    Video Start Time: 9:25 AM   Video End Time: 9:52 AM    Originating Location (pt. Location): Other parked car    Distant Location (provider location):  Offsite (providers home) [facility] [place]     Platform used for Video Visit: [employer]    Nutrition Assessment  Reason For Visit:  Barbara Camacho is a 54 year old female presents today for new re-establish nutrition visit. Pt with history of RNYGB in 2009.  Patient referred by NOAH Carranza on July 10, 2025.    Patient with Co-morbidities of obesity including:  Type II DM no  Renal Failure no  Sleep apnea yes  Hypertension no   Dyslipidemia yes  Joint pain yes  Back pain yes  GERD yes    Anthropometrics:  Pre-op Weight: 303 lbs  Lowest weight after surgery: 140 lb   Current Weight:   Estimated body mass index is 39.5 kg/m  as calculated from the following:    Height as of 7/10/25: 1.6 m (5' 3\").    Weight as of 7/10/25: 101.2 kg (223 lb).    Weight Loss Medications: Topiramate- has not started yet    Wegovy denied until patient has been in a weight loss program for at least 6 months and has had at least 2 visits     Current Vitamins/Minerals: Currently taking a multivitamin, vitamin d, calcium, iron supplement. Also taking a fiber supplement.     Nutrition History:    Per Abena's visit: typically eats 3 meals a day- tries to minimize snacks. Craves fast food, increase in sweet cravings in the last year. Is able to get full- will experience vomiting if overeating since rygb. Struggles stay full until next meal- often will feel hungry 1 hour after previous meal. Does at " times struggle with portion control. Does experience food noise. Does at times experience emotional eating (anxiety). Sometimes will wake up in the middle of the night feeling hungry and will have to eat to go back to sleep- will eat jerrica peanut butter cups, crackers, other candy- this has been occurring a few times a week for the last 6 months. Does at times experience a loss of control around eating. Denies purging to compensate for weight gain.     Has no food allergies.  Intolerant to milk.    Patient has been able to maintain weight loss for several years after surgery. Over the past 3 years noticed gradual weight gain. Has experienced an increase in appetite and more snacking. Portions remain smaller. Does crave sugar often. To lose weight has tried to cut out bread and switched from drinking regular sugar soda to diet. Has also decreased frequency if soda consumed.      Eats a lot of fruits and vegetables. For protein will eat protein bars, egg whites 3 times per week, lactose free milk and some meat. Might not be meeting protein goal.     Snacks on hot cheetos, watermelon, strawberries and chocolate.    Rarely eats out or gets take out    Does not like water. Drinks low carb monster energy drink in the morning, 1-20oz circl water bottle the rest of the day. Diet coke occasionally- maybe 3 cans in a week. No ETOH.     Physical Activity:  Was off work for a while due to back surgery recovery. 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 Hopes to join a gym that has a swimming pool to help with exercise given her back pain. Likes walking but is limited in this due to back pain. Started PT yesterday.     Nutrition Prescription:  Grams Protein: 50-60 (minimum)  Amount of Fluid: 48-64 oz    Nutrition Diagnosis  Obesity r/t long history of positive energy balance aeb BMI >30.    Intervention  Materials/education provided on hypocaloric diet  "for weight loss, maintenance dietary guidelines after bariatric surgery, portion sizes, eating pace and chewing well, snacking, separation of beverages from meals, vitamin and mineral supplements after weight loss surgery, and protein needs.   Discussed goals pt hopes to accomplish in RD appointments  Discussed potential barriers and strategies to overcome.   Encouraged increased protein intake with snacks to help regulate blood sugars and increase satiety.   Patient demonstrates understanding.  Co-developed goals to work towards.   Provided pt with list of goals and resources below via [employer].     Expected Engagement: good    Goals:  1) Increase water intake, aim for 2 water bottles per day  2) Prioritize protein at meal and snack times, aim for 60-90 gm per day  3) Slow pace of meals by chewing food thoroughly to give time to recognize fullness cues    Protein Sources  [email or web address]     Quick/Easy Protein Sources:  Hard boiled eggs  Part-skim cheese sticks  Baby Bell cheese rounds  Low-fat/low-sugar Greek yogurt  Low-fat cottage cheese  Lean deli meat (chicken/turkey/ham)  Roasted chickpeas or lentils  Nuts   Turkey meat stick  Protein shake/bar  \"P3\" snack (cheese, nuts, deli meat)  Aldi's \"Protein Bread\"   \"Egglife\" egg white wrap    Tuna/salmon can/packet   Edamame  Home made protein balls    Carbohydrates  [email or web address]     Mindful Eating  [email or web address]     Hunger/Satiety scale  https://s.[place].[facility]/sites/default/files/wellness-hungersatietyscale.pdf     Physical hunger signs:   - Builds gradually     - Sensation of emptiness in stomach (rumbling)    - Based on biological need    - Goes away when satisfied/full     Psychological hunger signs:   - Occurs/comes on suddenly   - Felt mostly in the head (thoughts, preoccupation, etc)   - Crave specific foods    - Creates a desire to eat more despite fullness    - Independent of time last eaten     Lean protein " and/or high-fiber snack foods examples:   - 2 cup popcorn   - 1 cup mixed berries   - 15 almonds, walnuts, cashews   - carrot/celery sticks and 2 tbsp low-fat ranch   - 1 hard boiled egg   - Part-skim mozzarella cheese stick   - Low-fat, low-sugar greek yogurt with 1/2 cup berries   - Med apple or pear   - sliced bell peppers with 1/2 cup salsa   - 1/2 cup roasted chickpeas   - sliced cucumbers with vinegar    100-Calorie Snack List: [email or web address]://[email or web address]    100 calorie sweets: Smart Sweets, Dr. Malloy's Xylitol candy, Fiber One desserts, Fit and Active 100 calorie snack sweets at [facility]; [employer] 100 calorie pre-portioned cookies, sugar-free pudding, sugar-free jello.    Healthier Sweet alternatives:            - Yasso bars    - Outshine fruit bars    - Bare snacks (crispy dried fruit)               - Frozen yogurt ice cream alternatives               - Nature s Bakery brownie alternatives               - Could consider black bean, sweet potato or avocado based brownies               - Trail mix              - Homemade or store bought mousse              - Dessert hummus with fruit              - Chocolate covered fruit/nuts               - Yogurt covered fruit/nuts    - Homemade energy balls     After Weight Loss Surgery    Why Take Supplements for Life after Weight Loss Surgery  [email or web address]://[email or web address]     Supplements after Sleeve Gastrectomy, Gastric Bypass or Single Anastomosis Duodenal Switch  [email or web address]://[email or web address]    Keeping Up Your Diet after Weight Loss Surgery  https://[email or web address]/[email or web address]    Preventing Low Blood Sugar after Weight Loss Surgery  https://[email or web address]     Preventing Dumping Syndrome after Weight Loss Surgery  https://[email or web address]/[email or web address]     Follow-Up: September 15 at 9 AM    Time spent with patient: 27 minutes.  Jane Solorzano, MS, RD, LD        Again, thank you for allowing me to participate in the care of your patient.      Sincerely,    Jane DILLARD  BARB Solorzano

## 2025-07-17 NOTE — PATIENT INSTRUCTIONS
"Branden Jimenez,    It was a pleasure to meet with you today.    Follow-up with RD on September 15 at 9 AM    Thank you,    Jane Solorzano, RD, LD  If you would like to schedule or reschedule an appointment with the RD, please call [phone]    Nutrition Goals  1) Increase water intake, aim for 2 water bottles per day  2) Prioritize protein at meal and snack times, aim for 60-90 gm per day  3) Slow pace of meals by chewing food thoroughly to give time to recognize fullness cues    Protein Sources  [email or web address]     Quick/Easy Protein Sources:  Hard boiled eggs  Part-skim cheese sticks  Baby Bell cheese rounds  Low-fat/low-sugar Greek yogurt  Low-fat cottage cheese  Lean deli meat (chicken/turkey/ham)  Roasted chickpeas or lentils  Nuts   Turkey meat stick  Protein shake/bar  \"P3\" snack (cheese, nuts, deli meat)  Aldi's \"Protein Bread\"   \"Egglife\" egg white wrap    Tuna/salmon can/packet   Edamame  Home made protein balls    Carbohydrates  [email or web address]     Mindful Eating  [email or web address]     Hunger/Satiety scale  https://s.[place].[facility]/sites/default/files/wellness-hungersatietyscale.pdf     Physical hunger signs:   - Builds gradually     - Sensation of emptiness in stomach (rumbling)    - Based on biological need    - Goes away when satisfied/full     Psychological hunger signs:   - Occurs/comes on suddenly   - Felt mostly in the head (thoughts, preoccupation, etc)   - Crave specific foods    - Creates a desire to eat more despite fullness    - Independent of time last eaten     Lean protein and/or high-fiber snack foods examples:   - 2 cup popcorn   - 1 cup mixed berries   - 15 almonds, walnuts, cashews   - carrot/celery sticks and 2 tbsp low-fat ranch   - 1 hard boiled egg   - Part-skim mozzarella cheese stick   - Low-fat, low-sugar greek yogurt with 1/2 cup berries   - Med apple or pear   - sliced bell peppers with 1/2 cup salsa   - 1/2 cup roasted chickpeas   - sliced " cucumbers with vinegar    100-Calorie Snack List: [email or web address]://[email or web address].[email or web address]    100 calorie sweets: Smart Sweets, Dr. Malloy's Xylitol candy, Fiber One desserts, Fit and Active 100 calorie snack sweets at [employer]; Nabisco 100 calorie pre-portioned cookies, sugar-free pudding, sugar-free jello.    Healthier Sweet alternatives:            - Yasso bars    - Outshine fruit bars    - Bare snacks (crispy dried fruit)               - Frozen yogurt ice cream alternatives               - Nature s Bakery brownie alternatives               - Could consider black bean, sweet potato or avocado based brownies               - Trail mix              - Homemade or store bought mousse              - Dessert hummus with fruit              - Chocolate covered fruit/nuts               - Yogurt covered fruit/nuts    - Homemade energy balls     After Weight Loss Surgery    Why Take Supplements for Life after Weight Loss Surgery  [email or web address]://[employer]/[email or web address]     Supplements after Sleeve Gastrectomy, Gastric Bypass or Single Anastomosis Duodenal Switch  [email or web address]://[email or web address]    Keeping Up Your Diet after Weight Loss Surgery  [email or web address]://[email or web address]    Preventing Low Blood Sugar after Weight Loss Surgery  [email or web address]://[email or web address]     Preventing Dumping Syndrome after Weight Loss Surgery  [email or web address]://[email or web address]     COMPREHENSIVE WEIGHT MANAGEMENT PROGRAM  VIRTUAL SUPPORT GROUPS    At [facility], our Comprehensive Weight Management program offers on-line support groups for patients who are working on weight loss and considering, preparing for, or have had weight loss surgery.     There is no cost for this opportunity.  You are invited to attend the?Virtual Support Groups?provided by any of the following locations:    [facility] via [employer] Teams with Tayler Jaquez RD, RN  2.   [place] via [employer] Teams with Bereket Sumner, PhD, LP  3.   [place] via [employer] Teams with Leonora Mcnally, EMILE  4.    [facility] via a Zoom Meeting with PIYUSH Pugh    The following Support Group information can also be found on our website:  [email or web address]      [facility]   WEIGHT LOSS SURGERY SUPPORT GROUP  The support group is a patient-lead forum that meets monthly to share experiences, encouragement and education. It is open to those who have had weight loss surgery, are scheduled for surgery, or are considering surgery.   WHEN: 3rd Wednesday of each month from 5:00PM - 6:00PM using Microsoft Teams.   FACILITATOR: Led by Tayler Jack RD, DONTE, RN, the program's Clinical Coordinator.   TO REGISTER: Please contact the clinic via [employer] or call the nurse line directly at [phone] to inform our staff that you would like an invite sent to you and to let us know the email you would like the invite sent to. Prior to the meeting, a link with directions on how to join the meeting will be sent to you.    2025 Meetings, 3rd Wednesday, 5:00pm - 6:00pm    January 15: Let's Talk  February19: Let's Talk  March 19: Speaker: Jameson Norton RD, LD  April 16: Let's Talk  May 21: Speaker: Jerrica Cao RD, LD  June18: Let's Talk  July 16: Let's Talk  August 20: Let's Talk  September 17: No meeting.  October 15: Speaker: Barbara Young PsyD, LP  November 19: Let's Talk  December 17: Let's Talk    [facility] BARIATRIC CARE SUPPORT GROUP  This is open to all pre- and post- operative bariatric surgery patients as well as their support system.   WHEN: 3rd Tuesday of each month from 6:30 PM - 8:00 PM using Microsoft Teams.   FACILITATOR: Led by Bereket Sumner, Ph.D who is a Licensed Psychologist with the [facility] Comprehensive Weight Management Program.   TO REGISTER: Please send an email to Bereket Sumner, Ph.D., LP at?[email or web address]?if you would like an  invitation to the group. Prior to the meeting, a link with directions on how to join the meeting will be sent to you.    2025 Meetings, 3rd Tuesday January 21st: Open Forum  February 18th: Medications and Bariatric Surgery  Speaker: Mariela Christopher, [place] Resident  March 18th: Open Forum  April 15th: Genetics of Obesity as well as Q&A, Speaker: Ciara Sheth MD, [facility] Comprehensive Weight Management Program.  May 20th: Open Forum  June 17th: Nutritional Labeling, Speaker: ANDREA  July 15th: Open Forum  August 19th: Open Forum  September 16th: Open Forum  October 21st: Open Forum  November 18th: Holiday Eating, Speaker: TBD  December 16th: Open Forum    [facility] POST-OPERATIVE BARIATRIC SURGERY SUPPORT GROUP  This is a support group for [facility] bariatric patients (and those external to [facility]) who have had bariatric surgery and are at least 3 months post-surgery.  WHEN: 4th Thursday of the month from 11:00 AM - 12:00 PM using Microsoft Teams.   FACILITATOR: Led by Certified Bariatric Nurse, Leonora Mcnally RN, CBN.   TO REGISTER: Please send an email to Leonora at [email or web address] if you would like an invitation to the group.  Prior to the meeting, a link with directions on how to join the meeting will be sent to you.    2025 Meetings, 4th Thursday, 11:00am - 12:pm  January 23  February 27  March 27  April 24  May 22  Veronica 26  July 24  August 28  September 25  October 23  November 27: Thanksgiving Day, No meeting.      December 25: Jason Day, No meeting.        [facility]   HEALTHY LIFESTYLE COACHING GROUP  This is a 60 minute virtual coaching group for those who want to lead a healthier lifestyle. Come together to set goals and overcome barriers in a supportive group environment. We will address the four pillars of health: nutrition, exercise, sleep, and  emotional well-being.   WHEN: 1st Friday of the month, 12:30 PM - 1:30 PM   using a Zoom meeting.     FACILITATOR: Led by National Board-Certified Health and , Leonora Masters [facility].  TO REGISTER: Please call the 51edj at [phone] to register. You will get an appointment to attend in [employer] [place]. Fifteen minutes prior to the meeting, complete the e-check in and you will get the link to join the meeting.  There is no charge to attend this group and space is limited.  Please register for each month you wish to attend    2025 Meetings, 1st Friday, 12:30pm-1:30pm  January 3  February 7  March 7: No meeting.  April 4  May 2  Veronica 6  July 4: Fourth of July Holiday, No meeting.    August 1  September 5  October 3  November 7  December 5

## 2025-07-17 NOTE — NURSING NOTE
Is the patient currently in the state of [place]? yes    Location: home    Visit mode:VIDEO    If the visit is dropped, the patient can be reconnected by: VIDEO VISIT: Text to cell phone:   Telephone Information:   Mobile [phone]   Mobile Not on file.    and VIDEO VISIT: Send to e-mail at: [email or web address]    Will anyone else be joining the visit? NO  (If patient encounters technical issues they should call [phone]    Are changes needed to the allergy or medication list? No    Are refills needed on medications prescribed by this physician? NO    Reason for visit: Consult      Bernarda Quesada, Virtual Visit Facilitator    QNR Status: NA

## 2025-07-31 ENCOUNTER — RESULTS FOLLOW-UP (OUTPATIENT)
Dept: ENDOCRINOLOGY | Facility: CLINIC | Age: 55
End: 2025-07-31
Payer: COMMERCIAL

## 2025-08-12 ENCOUNTER — VIRTUAL VISIT (OUTPATIENT)
Dept: PHARMACY | Facility: CLINIC | Age: 55
End: 2025-08-12
Payer: COMMERCIAL

## 2025-08-12 VITALS — WEIGHT: 225 LBS | BODY MASS INDEX: 39.87 KG/M2 | HEIGHT: 63 IN

## 2025-08-12 DIAGNOSIS — Z78.9 TAKES DIETARY SUPPLEMENTS: ICD-10-CM

## 2025-08-12 DIAGNOSIS — M80.00XG OSTEOPOROSIS WITH CURRENT PATHOLOGICAL FRACTURE WITH DELAYED HEALING, UNSPECIFIED OSTEOPOROSIS TYPE, SUBSEQUENT ENCOUNTER: ICD-10-CM

## 2025-08-12 DIAGNOSIS — F32.A DEPRESSION, UNSPECIFIED DEPRESSION TYPE: ICD-10-CM

## 2025-08-12 DIAGNOSIS — Z87.891 CESSATION OF TOBACCO USE IN PREVIOUS 12 MONTHS: ICD-10-CM

## 2025-08-12 DIAGNOSIS — Z98.84 HISTORY OF BARIATRIC SURGERY: Primary | ICD-10-CM

## 2025-08-12 DIAGNOSIS — E66.9 OBESITY: ICD-10-CM

## 2025-08-12 DIAGNOSIS — E89.0 HYPOTHYROIDISM, POSTSURGICAL: ICD-10-CM

## 2025-08-12 DIAGNOSIS — F41.9 ANXIETY: ICD-10-CM

## 2025-08-12 DIAGNOSIS — K59.00 CONSTIPATION, UNSPECIFIED CONSTIPATION TYPE: ICD-10-CM

## 2025-08-12 DIAGNOSIS — R73.03 PREDIABETES: ICD-10-CM

## 2025-08-12 DIAGNOSIS — M54.16 LUMBAR RADICULOPATHY: ICD-10-CM

## 2025-08-12 DIAGNOSIS — K21.9 GASTROESOPHAGEAL REFLUX DISEASE, UNSPECIFIED WHETHER ESOPHAGITIS PRESENT: ICD-10-CM

## 2025-08-12 RX ORDER — LEVOTHYROXINE SODIUM 200 UG/1
200 TABLET ORAL DAILY
COMMUNITY
Start: 2025-06-30

## 2025-08-12 RX ORDER — CHLORHEXIDINE GLUCONATE 4 %
1 LIQUID (ML) TOPICAL DAILY
Qty: 100 TABLET | Refills: 3 | Status: SHIPPED | OUTPATIENT
Start: 2025-08-12

## 2025-08-12 RX ORDER — MIRTAZAPINE 45 MG/1
45 TABLET, FILM COATED ORAL AT BEDTIME
COMMUNITY
Start: 2025-07-25

## 2025-08-12 RX ORDER — MULTIVITAMIN WITH IRON
500 TABLET ORAL DAILY
Qty: 90 TABLET | Refills: 3 | Status: SHIPPED | OUTPATIENT
Start: 2025-08-12

## 2025-08-12 RX ORDER — IBUPROFEN 200 MG
200 TABLET ORAL 2 TIMES DAILY PRN
COMMUNITY

## 2025-08-12 RX ORDER — BUPROPION HYDROCHLORIDE 300 MG/1
300 TABLET ORAL EVERY MORNING
COMMUNITY
Start: 2025-07-28

## 2025-08-12 RX ORDER — GABAPENTIN 300 MG/1
600 CAPSULE ORAL 2 TIMES DAILY
COMMUNITY

## 2025-08-12 RX ORDER — TOLTERODINE 2 MG/1
2 CAPSULE, EXTENDED RELEASE ORAL DAILY
COMMUNITY

## 2025-08-12 RX ORDER — SENNOSIDES 8.6 MG
1 TABLET ORAL DAILY PRN
COMMUNITY

## 2025-08-12 RX ORDER — FLUTICASONE PROPIONATE 50 MCG
1-2 SPRAY, SUSPENSION (ML) NASAL DAILY
COMMUNITY
Start: 2025-06-02

## 2025-08-12 RX ORDER — BUSPIRONE HYDROCHLORIDE 15 MG/1
15 TABLET ORAL 3 TIMES DAILY
COMMUNITY
Start: 2025-07-28

## 2025-08-12 RX ORDER — CALCIUM CITRATE/VITAMIN D3 200MG-6.25
2 TABLET ORAL DAILY
Qty: 180 TABLET | Refills: 3 | Status: SHIPPED | OUTPATIENT
Start: 2025-08-12

## (undated) DEVICE — BLADE KNIFE SURG 15 371115

## (undated) DEVICE — SUCTION SLEEVE NEPTUNE 2 125MM 0703-005-125

## (undated) DEVICE — SU SILK 2-0 SH CR 5X18" C0125

## (undated) DEVICE — SOL NACL 0.9% IRRIG 1000ML BOTTLE 2F7124

## (undated) DEVICE — SPONGE LAP 18X18" X8435

## (undated) DEVICE — DRSG TELFA 3X8" 1238

## (undated) DEVICE — LINEN TOWEL PACK X6 WHITE 5487

## (undated) DEVICE — PACK NEURO MINOR UMMC SNE32MNMU4

## (undated) DEVICE — CLIP HORIZON SM RED WIDE SLOT 001201

## (undated) DEVICE — SU SILK 3-0 TIE 12X30" A304H

## (undated) DEVICE — SU ETHILON 3-0 PS-1 18" 1663H

## (undated) DEVICE — RETR RING LONE STAR 25X25CM 3310G

## (undated) DEVICE — CLIP HORIZON MED BLUE 002200

## (undated) DEVICE — PREP POVIDONE IODINE SCRUB 7.5% 4OZ APL82212

## (undated) DEVICE — SU SILK 2-0 TIE 12X30" A305H

## (undated) DEVICE — SU SILK 0 TIE 6X30" A306H

## (undated) DEVICE — ESU LIGASURE OPEN SEALER/DIVIDER SM JAW 16.5MM LF1212A

## (undated) DEVICE — PREP SKIN SCRUB TRAY 4461A

## (undated) DEVICE — RETR ELASTIC STAYS LONE STAR BLUNT DUAL LEAD 3550-1G

## (undated) DEVICE — SPONGE KITTNER 30-101

## (undated) DEVICE — ESU GROUND PAD ADULT W/CORD E7507

## (undated) DEVICE — TUBE ENDOTRACHEAL NIM TRIVANTAGE 7.0MM 8229707

## (undated) DEVICE — DRAIN JACKSON PRATT 10MM FLAT 4/4 PERF SU130-1311

## (undated) DEVICE — LINEN TOWEL PACK X30 5481

## (undated) DEVICE — GLOVE PROTEXIS BLUE W/NEU-THERA 8.0  2D73EB80

## (undated) DEVICE — NIM PROBE PRASS INCREMENTING TIP 8225825

## (undated) DEVICE — SU ETHILON 5-0 P-3 18" BLACK 698G

## (undated) DEVICE — SUCTION MANIFOLD DORNOCH ULTRA CART UL-CL500

## (undated) DEVICE — ESU ELEC BLADE 2.75" COATED/INSULATED E1455

## (undated) DEVICE — SU VICRYL 3-0 SH 8X18" UND J864D

## (undated) DEVICE — ESU PENCIL SMOKE EVAC W/ROCKER SWITCH 0703-047-000

## (undated) DEVICE — DRAIN JACKSON PRATT RESERVOIR 100ML SU130-1305

## (undated) DEVICE — GLOVE PROTEXIS POWDER FREE SMT 7.5  2D72PT75X

## (undated) DEVICE — PREP POVIDONE IODINE SOLUTION 10% 4OZ

## (undated) DEVICE — Device

## (undated) DEVICE — SU SILK 4-0 TIE 12X30" A303H

## (undated) RX ORDER — ONDANSETRON 2 MG/ML
INJECTION INTRAMUSCULAR; INTRAVENOUS
Status: DISPENSED
Start: 2019-10-21

## (undated) RX ORDER — FENTANYL CITRATE 50 UG/ML
INJECTION, SOLUTION INTRAMUSCULAR; INTRAVENOUS
Status: DISPENSED
Start: 2019-10-21

## (undated) RX ORDER — PROPOFOL 10 MG/ML
INJECTION, EMULSION INTRAVENOUS
Status: DISPENSED
Start: 2019-10-21

## (undated) RX ORDER — HYDROMORPHONE HYDROCHLORIDE 1 MG/ML
INJECTION, SOLUTION INTRAMUSCULAR; INTRAVENOUS; SUBCUTANEOUS
Status: DISPENSED
Start: 2019-10-21

## (undated) RX ORDER — LIDOCAINE HYDROCHLORIDE AND EPINEPHRINE 10; 10 MG/ML; UG/ML
INJECTION, SOLUTION INFILTRATION; PERINEURAL
Status: DISPENSED
Start: 2019-10-21

## (undated) RX ORDER — CEFAZOLIN SODIUM 2 G/100ML
INJECTION, SOLUTION INTRAVENOUS
Status: DISPENSED
Start: 2019-10-21